# Patient Record
Sex: FEMALE | Race: BLACK OR AFRICAN AMERICAN | NOT HISPANIC OR LATINO
[De-identification: names, ages, dates, MRNs, and addresses within clinical notes are randomized per-mention and may not be internally consistent; named-entity substitution may affect disease eponyms.]

---

## 2020-08-31 ENCOUNTER — APPOINTMENT (OUTPATIENT)
Dept: FAMILY MEDICINE | Facility: CLINIC | Age: 25
End: 2020-08-31
Payer: COMMERCIAL

## 2020-08-31 VITALS
TEMPERATURE: 98.1 F | OXYGEN SATURATION: 98 % | WEIGHT: 174.56 LBS | HEIGHT: 66 IN | HEART RATE: 85 BPM | SYSTOLIC BLOOD PRESSURE: 110 MMHG | BODY MASS INDEX: 28.05 KG/M2 | DIASTOLIC BLOOD PRESSURE: 70 MMHG

## 2020-08-31 LAB — CYTOLOGY CVX/VAG DOC THIN PREP: NORMAL

## 2020-08-31 PROCEDURE — 99385 PREV VISIT NEW AGE 18-39: CPT | Mod: 25

## 2020-08-31 PROCEDURE — 36415 COLL VENOUS BLD VENIPUNCTURE: CPT

## 2020-08-31 NOTE — HEALTH RISK ASSESSMENT
[Excellent] : ~his/her~  mood as  excellent [] : No [Yes] : Yes [No falls in past year] : Patient reported no falls in the past year [0] : 2) Feeling down, depressed, or hopeless: Not at all (0) [[id number]] : 0 [None] : None [With Significant Other] : lives with significant other [Employed] : employed [Single] : single [Fully functional (bathing, dressing, toileting, transferring, walking, feeding)] : Fully functional (bathing, dressing, toileting, transferring, walking, feeding) [Fully functional (using the telephone, shopping, preparing meals, housekeeping, doing laundry, using] : Fully functional and needs no help or supervision to perform IADLs (using the telephone, shopping, preparing meals, housekeeping, doing laundry, using transportation, managing medications and managing finances) [de-identified] :

## 2020-08-31 NOTE — ASSESSMENT
[FreeTextEntry1] : Patient was counseled on healthy eating habits, on daily exercise and stress relief. All medications and allergies were reviewed with the patient and any adjustments necessary were made. Patient was counseled to try engage in an exercise activity for at least 30 minutes 3-4 times a week.  Patient was advised to eat a diet low in fat and carbohydrates and high in protein, with plenty of vegetables. Patient was advised to try and engage in relaxing activities whenever possible.\par The patients blood was draw in office and will be followed and assessed for any issues.  Patient was told to return to the office if any issues arise.  Unless otherwise stated, the patient is to continue all other medications as previously prescribed.\par \par std\par Patient was counseled on STD testing and screening. Patient was counseled on appropriate safe sexual practices and universal precautions. Patients questions regarding options, causes and prognosis were discussed and answered. Patient options were reviewed.\par \par

## 2020-08-31 NOTE — PHYSICAL EXAM
[Well Nourished] : well nourished [Clear to Auscultation] : lungs were clear to auscultation bilaterally [Normal S1, S2] : normal S1 and S2 [Regular Rhythm] : with a regular rhythm [No Joint Swelling] : no joint swelling [No Rash] : no rash [Normal Gait] : normal gait [Normal Insight/Judgement] : insight and judgment were intact [Normal Affect] : the affect was normal

## 2020-08-31 NOTE — HISTORY OF PRESENT ILLNESS
[de-identified] : 24 year old female here to establish care and for a full physical examination. The patients complete medical, family and social history was documented and reviewed with the patient.  The patients medications were identified and also reviewed with the patient as well as any allergies to any medications. All active and previous medical problems were identified and discussed with the patient.  Any new problems were documented. Patient is feeling well today.\par

## 2020-09-01 LAB
ALBUMIN SERPL ELPH-MCNC: 4.7 G/DL
ALP BLD-CCNC: 42 U/L
ALT SERPL-CCNC: 29 U/L
ANION GAP SERPL CALC-SCNC: 13 MMOL/L
AST SERPL-CCNC: 21 U/L
BASOPHILS # BLD AUTO: 0.05 K/UL
BASOPHILS NFR BLD AUTO: 1 %
BILIRUB SERPL-MCNC: 0.3 MG/DL
BUN SERPL-MCNC: 10 MG/DL
CALCIUM SERPL-MCNC: 9.2 MG/DL
CHLORIDE SERPL-SCNC: 105 MMOL/L
CHOLEST SERPL-MCNC: 187 MG/DL
CHOLEST/HDLC SERPL: 2.8 RATIO
CO2 SERPL-SCNC: 24 MMOL/L
CREAT SERPL-MCNC: 0.96 MG/DL
EOSINOPHIL # BLD AUTO: 0.32 K/UL
EOSINOPHIL NFR BLD AUTO: 6.4 %
GLUCOSE SERPL-MCNC: 83 MG/DL
HCT VFR BLD CALC: 41.7 %
HDLC SERPL-MCNC: 66 MG/DL
HGB BLD-MCNC: 12.6 G/DL
HIV1+2 AB SPEC QL IA.RAPID: NONREACTIVE
HSV 1+2 IGG SER IA-IMP: NEGATIVE
HSV 1+2 IGG SER IA-IMP: POSITIVE
HSV1 IGG SER QL: 46.5 INDEX
HSV2 IGG SER QL: 0.16 INDEX
IMM GRANULOCYTES NFR BLD AUTO: 0.2 %
LDLC SERPL CALC-MCNC: 109 MG/DL
LYMPHOCYTES # BLD AUTO: 2.13 K/UL
LYMPHOCYTES NFR BLD AUTO: 42.7 %
MAN DIFF?: NORMAL
MCHC RBC-ENTMCNC: 27.3 PG
MCHC RBC-ENTMCNC: 30.2 GM/DL
MCV RBC AUTO: 90.5 FL
MONOCYTES # BLD AUTO: 0.47 K/UL
MONOCYTES NFR BLD AUTO: 9.4 %
NEUTROPHILS # BLD AUTO: 2.01 K/UL
NEUTROPHILS NFR BLD AUTO: 40.3 %
PLATELET # BLD AUTO: 311 K/UL
POTASSIUM SERPL-SCNC: 4.5 MMOL/L
PROT SERPL-MCNC: 7.7 G/DL
RBC # BLD: 4.61 M/UL
RBC # FLD: 13.8 %
SARS-COV-2 IGG SERPL IA-ACNC: 0.1 INDEX
SARS-COV-2 IGG SERPL QL IA: NEGATIVE
SODIUM SERPL-SCNC: 141 MMOL/L
T PALLIDUM AB SER QL IA: NEGATIVE
TRIGL SERPL-MCNC: 57 MG/DL
TSH SERPL-ACNC: 0.7 UIU/ML
WBC # FLD AUTO: 4.99 K/UL

## 2020-09-03 LAB
C TRACH RRNA SPEC QL NAA+PROBE: DETECTED
N GONORRHOEA RRNA SPEC QL NAA+PROBE: NOT DETECTED
SOURCE AMPLIFICATION: NORMAL

## 2020-12-15 ENCOUNTER — RESULT REVIEW (OUTPATIENT)
Age: 25
End: 2020-12-15

## 2022-02-16 ENCOUNTER — RESULT REVIEW (OUTPATIENT)
Age: 27
End: 2022-02-16

## 2022-05-06 ENCOUNTER — LABORATORY RESULT (OUTPATIENT)
Age: 27
End: 2022-05-06

## 2022-05-06 ENCOUNTER — APPOINTMENT (OUTPATIENT)
Dept: ULTRASOUND IMAGING | Facility: IMAGING CENTER | Age: 27
End: 2022-05-06

## 2022-05-06 ENCOUNTER — APPOINTMENT (OUTPATIENT)
Dept: HEPATOLOGY | Facility: CLINIC | Age: 27
End: 2022-05-06
Payer: COMMERCIAL

## 2022-05-06 VITALS
RESPIRATION RATE: 16 BRPM | HEIGHT: 66.75 IN | SYSTOLIC BLOOD PRESSURE: 107 MMHG | TEMPERATURE: 97.7 F | DIASTOLIC BLOOD PRESSURE: 74 MMHG | HEART RATE: 93 BPM | OXYGEN SATURATION: 98 % | BODY MASS INDEX: 31.08 KG/M2 | WEIGHT: 198 LBS

## 2022-05-06 PROCEDURE — 99205 OFFICE O/P NEW HI 60 MIN: CPT

## 2022-05-06 RX ORDER — AZITHROMYCIN 1 G/1
1 POWDER, FOR SUSPENSION ORAL
Qty: 1 | Refills: 0 | Status: DISCONTINUED | COMMUNITY
Start: 2020-09-03 | End: 2022-05-06

## 2022-05-09 LAB
AFP-TM SERPL-MCNC: 43.9 NG/ML
ALBUMIN SERPL ELPH-MCNC: 4 G/DL
ALP BLD-CCNC: 38 U/L
ALT SERPL-CCNC: 25 U/L
ANION GAP SERPL CALC-SCNC: 12 MMOL/L
AST SERPL-CCNC: 19 U/L
BASOPHILS # BLD AUTO: 0.04 K/UL
BASOPHILS NFR BLD AUTO: 0.3 %
BILIRUB SERPL-MCNC: 0.2 MG/DL
BUN SERPL-MCNC: 7 MG/DL
CALCIUM SERPL-MCNC: 9.5 MG/DL
CHLORIDE SERPL-SCNC: 103 MMOL/L
CO2 SERPL-SCNC: 21 MMOL/L
CREAT SERPL-MCNC: 0.62 MG/DL
EGFR: 124 ML/MIN/1.73M2
EOSINOPHIL # BLD AUTO: 0.35 K/UL
EOSINOPHIL NFR BLD AUTO: 2.9 %
HAV IGM SER QL: NONREACTIVE
HBV CORE IGM SER QL: NONREACTIVE
HBV DNA # SERPL NAA+PROBE: 2203 IU/ML
HBV SURFACE AG SER QL: REACTIVE
HCT VFR BLD CALC: 36.4 %
HCV AB SER QL: NONREACTIVE
HCV S/CO RATIO: 0.07 S/CO
HEPATITIS A IGG ANTIBODY: REACTIVE
HEPB DNA PCR INT: DETECTED
HEPB DNA PCR LOG: 3.34 LOGIU/ML
HGB BLD-MCNC: 11.6 G/DL
IMM GRANULOCYTES NFR BLD AUTO: 1.1 %
INR PPP: 0.97 RATIO
LYMPHOCYTES # BLD AUTO: 2.04 K/UL
LYMPHOCYTES NFR BLD AUTO: 17.1 %
MAN DIFF?: NORMAL
MCHC RBC-ENTMCNC: 27.6 PG
MCHC RBC-ENTMCNC: 31.9 GM/DL
MCV RBC AUTO: 86.7 FL
MONOCYTES # BLD AUTO: 0.78 K/UL
MONOCYTES NFR BLD AUTO: 6.5 %
NEUTROPHILS # BLD AUTO: 8.62 K/UL
NEUTROPHILS NFR BLD AUTO: 72.1 %
PLATELET # BLD AUTO: 288 K/UL
POTASSIUM SERPL-SCNC: 4.2 MMOL/L
PROT SERPL-MCNC: 6.8 G/DL
PT BLD: 11.4 SEC
RBC # BLD: 4.2 M/UL
RBC # FLD: 13.8 %
SODIUM SERPL-SCNC: 135 MMOL/L
WBC # FLD AUTO: 11.96 K/UL

## 2022-05-09 NOTE — ASSESSMENT
[FreeTextEntry1] : Ms. AMBAR ARENAS is 26 year old female who presents for the initial evaluation with HBV.\par \par # Hepatitis BV - *HBV panel- Reactive to HBs Ag, anti-HBc, anti-HBe; Non-reactive anti-HBs, HBe Ag; Will start on Anti-viral which are safe during pregnancy and the plan of  being vaccinated and Immunoglobulin given at the time of delivery. Advised to get US and FS as ordered. \par \par > HBV DNA - Labs on 2022 shows detected HBV DNA 2203/3.34 < 69501/4.03 (2022). \par > Pregnant - she was evaluated by her GYN, She is 16 weeks pregnant, ETD in Oct 16, 2022. >elevated AFP 43.9 (pregnant) WBC 11.96, Low Alp 38, chronic low MCHC 31.9, WDL- , INR0.97; \par > Risk - PH/o had STDs x2 and was treated with azithromycin for chlamydia x2, + HSV + Gardnerella Vaginalis (2020). Her partner was treated as well, she is an [place] from [facility] at age of 1 year old with her parents. \par \par R/O Acute LD - She feels well. Denies c/o abdominal pain, pruritis, melena, No MH/O Liver diseases, clinical hepatitis, Jaundice.\par Social H/O denies alcohol use or smoking. 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 No pertinent MH/SH hernia repair. Denies any FH/O Liver disease or GI cancers. \par COL/EGD – Never done. Pertinent labs shows A+ blood type, \par \par # Immunity – NR: HAV and HCV (2022) Immune to HAV. +Anti-COVID TNC 83.90 (2022) she completed the 2 dose COVID vaccine with no adverse effects.\par Additional Providers: PCP Diana Stewart.\par \par PLAN to F/U with Dr. Dick with labs, US and FS with RPA to establish plan of care while pregnant. \par Encouraged to call back in the interim with any issues or concerns so that we can address and assist as required.

## 2022-05-09 NOTE — PHYSICAL EXAM
[Scleral Icterus] : No Scleral Icterus [Spider Angioma] : No spider angioma(s) were observed [Abdominal  Ascites] : no ascites [Asterixis] : no asterixis observed [Jaundice] : No jaundice [Palmar Erythema] : no Palmar Erythema [Depression] : no depression [FreeTextEntry4] : 16 weeks Pregnant  [General Appearance - Alert] : alert [General Appearance - Well Nourished] : well nourished [Sclera] : the sclera and conjunctiva were normal [Neck Appearance] : the appearance of the neck was normal [Neck Cervical Mass (___cm)] : no neck mass was observed [Exaggerated Use Of Accessory Muscles For Inspiration] : no accessory muscle use [Apical Impulse] : the apical impulse was normal [Heart Sounds] : normal S1 and S2 [Edema] : there was no peripheral edema [Veins - Varicosity Changes] : there were no varicosital changes [Bowel Sounds] : normal bowel sounds [Abdomen Tenderness] : non-tender [Cervical Lymph Nodes Enlarged Posterior Bilaterally] : posterior cervical [Supraclavicular Lymph Nodes Enlarged Bilaterally] : supraclavicular [No CVA Tenderness] : no ~M costovertebral angle tenderness [Abnormal Walk] : normal gait [Involuntary Movements] : no involuntary movements were seen [Skin Color & Pigmentation] : normal skin color and pigmentation [] : no rash [Skin Lesions] : no skin lesions [No Focal Deficits] : no focal deficits [Oriented To Time, Place, And Person] : oriented to person, place, and time [Affect] : the affect was normal

## 2022-05-09 NOTE — HISTORY OF PRESENT ILLNESS
[Tattoo] : tattoo(s) [Body Piercing] : body piercing [Alcohol Abuse] : alcohol abuse [Household Contact to HBV] : household contact to HBV [Occupational Exposure] : occupational exposure [Mild] : mild [Fatigue] : denies fatigue [Malaise] : denies malaise [Fever] : denies fever [Diffuse Joint Pain (Arthralgias)] : denies arthralgias [Muscle Aches, Generalized (Myalgias)] : denies myalgias [Yellow Skin Or Eyes (Jaundice)] : denies jaundice [Abdominal Pain] : denies abdominal pain [Urine Tests Nonspecific Abnormal Findings Biliuria] : denies dark urine [Light Colored Bowel Movement (Acholic Stools)] : denies pale stools [Insomnia] : denies insomnia [Skin: Rash] : denies rash [Itching (Pruritus)] : denies pruritus [Shortness Of Breath] : denies shortness of breath [Wt Gain ___ Lbs] : recent [unfilled] ~Upound(s) weight gain [Needlestick Exposure] : no needlestick exposure [Infected Sexual Partner] : no infected sexual partner [IV Drug Use] : no IV drug use [Hemodialysis] : no hemodialysis [Transfusion before 1992] : no transfusion before 1992 [Transplant before 1992] : no transplant before 1992 [Incarceration] : no incarceration [Autoimmune Disorder] : no autoimmune disorder [Travel to Endemic Area] : no travel to an endemic area [Cocaine Use] : no cocaine use [de-identified] : Ms. AMBAR ARENAS is 26 year old female who presents for the initial evaluation with Hepatitis BV as she was evaluated for pregnancy by her GYN, She is 16 weeks pregnant, ETD in Oct 16, 2022. She feels well. Denies c/o abdominal pain, pruritis, melena, No MH/O Liver diseases, clinical hepatitis, Jaundice.\par \par PH/o had STDs x2 and was treated with azithromycin for chlamydia x2, + HSV + Gardnerella Vaginalis (08/31/2020). Her partner was treated as well, she is an [place] from [facility] at age of 1 year old with her parents. \par Social H/O denies alcohol use or smoking. 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 No pertinent MH/SH hernia repair. Denies any FH/O Liver disease or GI cancers. \par COL/EGD – Never done.\par Immunity - Not checked. She completed the 2 dose COVID vaccine with no adverse effects.\par *HBV panel- Reactive to HBs Ag, anti-HBc, anti-HBe; Non-reactive anti-HBs, HBe Ag; \par Immunity – NR: HAV and HCV (05/06/2022) Immune to HAV. +Anti-COVID TNC 83.90 (03/11/2022)\par \par Labs on 05/06/2022 shows elevated AFP 43.9 (pregnant) WBC 11.96, chronic low MCHC 31.9, WDL- , INR 0.97; Low Alp 38, detected HBV DNA 2203/3.34 < 36004/4.03 (03/17/2022). \par Labs on 09/01/2020 shows WDL- CMP, TSH, high ,  \par Pertinent labs shows A+ blood type, \par \par Additional Providers: PCP Diana Stewart.\par  [de-identified] : ear piercing, 2 year and 6 year old tattoos.

## 2022-05-16 ENCOUNTER — APPOINTMENT (OUTPATIENT)
Dept: HEPATOLOGY | Facility: CLINIC | Age: 27
End: 2022-05-16

## 2022-05-27 ENCOUNTER — INPATIENT (INPATIENT)
Facility: HOSPITAL | Age: 27
LOS: 0 days | Discharge: HOME | DRG: 833 | End: 2022-05-28
Attending: OBSTETRICS & GYNECOLOGY | Admitting: OBSTETRICS & GYNECOLOGY
Payer: COMMERCIAL

## 2022-05-27 VITALS
TEMPERATURE: 100 F | SYSTOLIC BLOOD PRESSURE: 114 MMHG | HEART RATE: 91 BPM | DIASTOLIC BLOOD PRESSURE: 69 MMHG | OXYGEN SATURATION: 99 % | RESPIRATION RATE: 16 BRPM

## 2022-05-27 DIAGNOSIS — O26.899 OTHER SPECIFIED PREGNANCY RELATED CONDITIONS, UNSPECIFIED TRIMESTER: ICD-10-CM

## 2022-05-27 DIAGNOSIS — Z3A.00 WEEKS OF GESTATION OF PREGNANCY NOT SPECIFIED: ICD-10-CM

## 2022-05-27 DIAGNOSIS — Z34.80 ENCOUNTER FOR SUPERVISION OF OTHER NORMAL PREGNANCY, UNSPECIFIED TRIMESTER: ICD-10-CM

## 2022-05-27 RX ORDER — SODIUM CHLORIDE 9 MG/ML
1000 INJECTION, SOLUTION INTRAVENOUS
Refills: 0 | Status: COMPLETED | OUTPATIENT
Start: 2022-05-27 | End: 2022-05-28

## 2022-05-27 NOTE — OB PROVIDER H&P - ATTENDING COMMENTS
OB attending     patient is 27 yo  at 19wks presenting to triage for vaginal spotting found to have shortened cervix to 1cm with funneling.    agree with assessment and plan by PGY3.   appreciate MfM consult in AM for possible cerclage placement.   plan of care reviewed with patient, all questions answered.     Carline Ward MD

## 2022-05-27 NOTE — OB PROVIDER H&P - ASSESSMENT
AMBAR ARENAS is a 26y  @ 19w5d admitted for exam indicated rescue cerclage.    #Cervical Insufficiency  - funneling, external os closed, CL 1cm    - VSS and ROS unremarkable     - f/u T&S, CBC, UA, UCx  - continuous toco overnight  - NPO at midnight     #Fetal Status  - FH QD     #Maternal Status  - NPO   - SCDs for DVT prophylaxis     Patient status and plan of care discussed with Dr. Ward.    Roxana Joseph MD  OBGYN PGY3

## 2022-05-27 NOTE — OB PROVIDER H&P - NSINFECTIONS_OBGYN_ALL_OB
None Statement Selected

## 2022-05-27 NOTE — OB PROVIDER H&P - CURRENT PREGNANCY COMPLICATIONS, OB PROFILE
Patient presents to ER via medics with c/o cough, chills since Saturday. Family reports patient coughing up green sputum. None

## 2022-05-27 NOTE — OB PROVIDER H&P - HISTORY OF PRESENT ILLNESS
AMBAR ARENAS is a 26y  @ 19w5d who presents for evaluation after noticing bloody spotting on tissue following a void this evening.  course has been uncomplicated. Pt denies any contractions, dayna vaginal bleeding or leaking of fluid.     OB/GYN HISTORY:     CAT: 10/16/22   (SAB, D&C x1)   Chlamydia , (-) ZAYDA status post tx  Pt denies any hx of fibroids, endometriosis, known ovarian cyst, or abnormal pap smears      PMH: denies   SurgHx: D&C x1  SocHx: denies A/T/D use   Meds: PNV  All: Denies                                           REVIEW OF SYSTEMS:  CONSTITUTIONAL: No weakness, fevers or chills  EYES/ENT: No visual changes  RESPIRATORY: No cough, No shortness of breath  CARDIOVASCULAR: No chest pain or palpitations  GASTROINTESTINAL: No abdominal or epigastric pain. No nausea, vomiting; No diarrhea or constipation  GENITOURINARY: No dysuria, frequency or hematuria  NEUROLOGICAL: No headache, LOC  All other review of systems is negative unless indicated above      Vital Signs Last 24 Hrs  T(C): 37.5 (27 May 2022 19:49), Max: 37.5 (27 May 2022 19:39)  T(F): 99.5 (27 May 2022 19:49), Max: 99.5 (27 May 2022 19:39)  HR: 108 (27 May 2022 23:22) (79 - 108)  BP: 114/69 (27 May 2022 19:51) (114/69 - 114/69)  RR: 16 (27 May 2022 19:49) (16 - 16)  SpO2: 99% (27 May 2022 23:22) (94% - 100%)    PHYSICAL EXAM:  Constitutional: NAD, awake and alert, well-developed  Respiratory: Breathing comfortably on RA  Gastrointestinal: gravid, soft, nontender  Genitourinary: no vaginal bleeding, cervix closed on SSE, SVE deferred    Extremities: No peripheral edema  Neurological: A/O x 3, no focal deficits    FHR: 140s  [place]: no ctx     TA/TVUS: vtx, ANGEL wnl, anterior placenta far from cervix, internal os 1cm dilated w/ funneling appreciated to external os (which is closed, ~ 1cm CL)    LABS: (pending)

## 2022-05-28 ENCOUNTER — ASOB RESULT (OUTPATIENT)
Age: 27
End: 2022-05-28

## 2022-05-28 ENCOUNTER — APPOINTMENT (OUTPATIENT)
Dept: ANTEPARTUM | Facility: CLINIC | Age: 27
End: 2022-05-28

## 2022-05-28 ENCOUNTER — TRANSCRIPTION ENCOUNTER (OUTPATIENT)
Age: 27
End: 2022-05-28

## 2022-05-28 VITALS
HEART RATE: 85 BPM | TEMPERATURE: 99 F | SYSTOLIC BLOOD PRESSURE: 113 MMHG | DIASTOLIC BLOOD PRESSURE: 64 MMHG | RESPIRATION RATE: 17 BRPM

## 2022-05-28 LAB
APPEARANCE UR: CLEAR — SIGNIFICANT CHANGE UP
BASOPHILS # BLD AUTO: 0.05 K/UL — SIGNIFICANT CHANGE UP (ref 0–0.2)
BASOPHILS NFR BLD AUTO: 0.5 % — SIGNIFICANT CHANGE UP (ref 0–2)
BILIRUB UR-MCNC: NEGATIVE — SIGNIFICANT CHANGE UP
BLD GP AB SCN SERPL QL: NEGATIVE — SIGNIFICANT CHANGE UP
COLOR SPEC: COLORLESS — SIGNIFICANT CHANGE UP
COVID-19 SPIKE DOMAIN AB INTERP: POSITIVE
COVID-19 SPIKE DOMAIN ANTIBODY RESULT: >250 U/ML — HIGH
DIFF PNL FLD: NEGATIVE — SIGNIFICANT CHANGE UP
EOSINOPHIL # BLD AUTO: 0.21 K/UL — SIGNIFICANT CHANGE UP (ref 0–0.5)
EOSINOPHIL NFR BLD AUTO: 2 % — SIGNIFICANT CHANGE UP (ref 0–6)
GLUCOSE UR QL: NEGATIVE — SIGNIFICANT CHANGE UP
HCT VFR BLD CALC: 36.5 % — SIGNIFICANT CHANGE UP (ref 34.5–45)
HGB BLD-MCNC: 12.1 G/DL — SIGNIFICANT CHANGE UP (ref 11.5–15.5)
IMM GRANULOCYTES NFR BLD AUTO: 1 % — SIGNIFICANT CHANGE UP (ref 0–1.5)
KETONES UR-MCNC: NEGATIVE — SIGNIFICANT CHANGE UP
LEUKOCYTE ESTERASE UR-ACNC: NEGATIVE — SIGNIFICANT CHANGE UP
LYMPHOCYTES # BLD AUTO: 19.1 % — SIGNIFICANT CHANGE UP (ref 13–44)
LYMPHOCYTES # BLD AUTO: 2.01 K/UL — SIGNIFICANT CHANGE UP (ref 1–3.3)
MCHC RBC-ENTMCNC: 28.1 PG — SIGNIFICANT CHANGE UP (ref 27–34)
MCHC RBC-ENTMCNC: 33.2 GM/DL — SIGNIFICANT CHANGE UP (ref 32–36)
MCV RBC AUTO: 84.9 FL — SIGNIFICANT CHANGE UP (ref 80–100)
MONOCYTES # BLD AUTO: 0.63 K/UL — SIGNIFICANT CHANGE UP (ref 0–0.9)
MONOCYTES NFR BLD AUTO: 6 % — SIGNIFICANT CHANGE UP (ref 2–14)
NEUTROPHILS # BLD AUTO: 7.54 K/UL — HIGH (ref 1.8–7.4)
NEUTROPHILS NFR BLD AUTO: 71.4 % — SIGNIFICANT CHANGE UP (ref 43–77)
NITRITE UR-MCNC: NEGATIVE — SIGNIFICANT CHANGE UP
NRBC # BLD: 0 /100 WBCS — SIGNIFICANT CHANGE UP (ref 0–0)
PH UR: 7.5 — SIGNIFICANT CHANGE UP (ref 5–8)
PLATELET # BLD AUTO: 273 K/UL — SIGNIFICANT CHANGE UP (ref 150–400)
PROT UR-MCNC: NEGATIVE — SIGNIFICANT CHANGE UP
RBC # BLD: 4.3 M/UL — SIGNIFICANT CHANGE UP (ref 3.8–5.2)
RBC # FLD: 13.5 % — SIGNIFICANT CHANGE UP (ref 10.3–14.5)
RH IG SCN BLD-IMP: POSITIVE — SIGNIFICANT CHANGE UP
RH IG SCN BLD-IMP: POSITIVE — SIGNIFICANT CHANGE UP
SARS-COV-2 IGG+IGM SERPL QL IA: >250 U/ML — HIGH
SARS-COV-2 IGG+IGM SERPL QL IA: POSITIVE
SARS-COV-2 RNA SPEC QL NAA+PROBE: SIGNIFICANT CHANGE UP
SP GR SPEC: 1.01 — SIGNIFICANT CHANGE UP (ref 1.01–1.02)
T PALLIDUM AB TITR SER: NEGATIVE — SIGNIFICANT CHANGE UP
UROBILINOGEN FLD QL: NEGATIVE — SIGNIFICANT CHANGE UP
WBC # BLD: 10.55 K/UL — HIGH (ref 3.8–10.5)
WBC # FLD AUTO: 10.55 K/UL — HIGH (ref 3.8–10.5)

## 2022-05-28 PROCEDURE — 76815 OB US LIMITED FETUS(S): CPT

## 2022-05-28 PROCEDURE — 86900 BLOOD TYPING SEROLOGIC ABO: CPT

## 2022-05-28 PROCEDURE — G0463: CPT

## 2022-05-28 PROCEDURE — 85025 COMPLETE CBC W/AUTO DIFF WBC: CPT

## 2022-05-28 PROCEDURE — 87591 N.GONORRHOEAE DNA AMP PROB: CPT

## 2022-05-28 PROCEDURE — 87086 URINE CULTURE/COLONY COUNT: CPT

## 2022-05-28 PROCEDURE — 87800 DETECT AGNT MULT DNA DIREC: CPT

## 2022-05-28 PROCEDURE — 81003 URINALYSIS AUTO W/O SCOPE: CPT

## 2022-05-28 PROCEDURE — 36415 COLL VENOUS BLD VENIPUNCTURE: CPT

## 2022-05-28 PROCEDURE — 87635 SARS-COV-2 COVID-19 AMP PRB: CPT

## 2022-05-28 PROCEDURE — 76817 TRANSVAGINAL US OBSTETRIC: CPT | Mod: 26

## 2022-05-28 PROCEDURE — 86780 TREPONEMA PALLIDUM: CPT

## 2022-05-28 PROCEDURE — 99253 IP/OBS CNSLTJ NEW/EST LOW 45: CPT

## 2022-05-28 PROCEDURE — 86901 BLOOD TYPING SEROLOGIC RH(D): CPT

## 2022-05-28 PROCEDURE — 86850 RBC ANTIBODY SCREEN: CPT

## 2022-05-28 PROCEDURE — 86769 SARS-COV-2 COVID-19 ANTIBODY: CPT

## 2022-05-28 PROCEDURE — G0378: CPT

## 2022-05-28 PROCEDURE — 76817 TRANSVAGINAL US OBSTETRIC: CPT

## 2022-05-28 PROCEDURE — 76815 OB US LIMITED FETUS(S): CPT | Mod: 26,59

## 2022-05-28 RX ORDER — PROGESTERONE 200 MG/1
200 CAPSULE, LIQUID FILLED ORAL
Qty: 30 | Refills: 0
Start: 2022-05-28 | End: 2022-06-26

## 2022-05-28 RX ORDER — SODIUM CHLORIDE 9 MG/ML
1000 INJECTION INTRAMUSCULAR; INTRAVENOUS; SUBCUTANEOUS
Refills: 0 | Status: DISCONTINUED | OUTPATIENT
Start: 2022-05-28 | End: 2022-05-28

## 2022-05-28 RX ADMIN — SODIUM CHLORIDE 125 MILLILITER(S): 9 INJECTION, SOLUTION INTRAVENOUS at 00:52

## 2022-05-28 RX ADMIN — SODIUM CHLORIDE 125 MILLILITER(S): 9 INJECTION INTRAMUSCULAR; INTRAVENOUS; SUBCUTANEOUS at 03:35

## 2022-05-28 NOTE — DISCHARGE NOTE ANTEPARTUM - ADDITIONAL INSTRUCTIONS
Call your doctor if you experience abdominal pain, decreased fetal movement, contractions, leakage of fluid, and vaginal bleeding.   Call your doctor and go to the ER if you experience severe discomfort, chest pain, shortness of breath, fever greater than 100.4, vaginal bleeding.

## 2022-05-28 NOTE — PROGRESS NOTE ADULT - ASSESSMENT
AMBAR ARENAS is a 26y  @ 19w6d admitted for exam indicated rescue cerclage.    #Cervical Insufficiency  - funneling, external os closed, CL 1cm    - VSS and ROS unremarkable     - WBC 10.5, UA unremarkable   - continuous toco overnight, no ctx       #Fetal Status  - FH QD     #Maternal Status  - NPO   - SCDs for DVT prophylaxis       Roxana Jsoeph MD  OBGYN PGY3 
A/P: 25yo  at 19w6d admitted with short cervix 1.2-1.3cm in setting of one episode of VB. Rh+. No further bleeding. Pt comfortable. Discussed with patient implications of short cervix including inc risk of  delivery, specifically that of extreme prematurity with possibilty of infant who has chronic complications such as cerebral palsy, motor/cognitive delay. Neurosensory deficits. Explained that knowing this risk termination of pregnancy is a reasonable option. Patient declined this option. Due to CL of 1.2-1.3cm w/ closed cervix not candidate for exam/"rescue"  cerclage - however best evidence supports vaginal supp therapy with close surveillance with weekly CL. Explained if CL becomes 1.0cm or less may be canidate and that this may include a preoperative amniocentesis to verify no intramniotic infection. Also retesting for above STI today to ensure no current infection including hep B viral load as risk of vertical transmission low however present if amniocentesis performed. All questions answered pt verbalized understanding opted for vag P w/ serial CL. To follow up this week. Precautions reviewed.    Patient seen with Dr. Balderrama (M attending)    Russell Dowling M.D. PGY-5  Maternal Fetal Medicine Fellow  Cell: [phone] if after 5pm or weekend ask labor and delivery for on call fellow

## 2022-05-28 NOTE — PROGRESS NOTE ADULT - NSPROGADDITIONALINFOA_GEN_ALL_CORE
OB attending   pt with spotting overnight  denies additional bleeding or cramping +FM  will see MFM consultation and then coordinate next steps  all questions answered  Maritza Warren< MD

## 2022-05-28 NOTE — PROGRESS NOTE ADULT - SUBJECTIVE AND OBJECTIVE BOX
MFM Fellow Consult Note    HPI: 27yo  at 19w6d who is admitted following an episode of spotting and passage of pea sized clot. On presentation yesterday noted to have CL of 1.1. She was kept overnight for MFM evaluation today to determine next best steps in management. Of note history significant for chronic hep B, chlamydia infection x3, trichamonas infection. Test of cure documentation provided by Dr. Warren. Denies ctx/lof. Denies fever/chills. Does not yet appreciate fetal movement.        Histories  : SAB 8 wk D&C  G2: Ectopic preg MTX  GYN: STI as described above  Surg: D&C  Social: none  Meds: PNV    Physical Exam  ICU Vital Signs Last 24 Hrs  T(C): 37.0 (28 May 2022 13:27), Max: 37.5 (27 May 2022 19:39)  T(F): 98.6 (28 May 2022 13:27), Max: 99.5 (27 May 2022 19:39)  HR: 85 (28 May 2022 13:27) (75 - 108)  BP: 113/64 (28 May 2022 13:27) (112/66 - 114/69)  BP(mean): --  ABP: --  ABP(mean): --  RR: 17 (28 May 2022 13:27) (16 - 17)  SpO2: 100% (28 May 2022 13:10) (91% - 100%)  Gen: well appearing  Pelvic: Cervix closed, physiologic discharge, cultures collected    Ultrasound - Full report in AS  CL 1.2-1.3 w/ u shaped funneling, present during suprapubic pressure  Limited anatomy within normal limits    
R3 Antepartum Note, HD#2    Interval events: Patient seen and examined at bedside, no acute overnight events. No acute complaints. Pt denies LOF, VB, ctx, fevers, or chills.    Vital Signs Last 24 Hours  T(C): 37.5 (05-28-22 @ 00:13), Max: 37.5 (05-27-22 @ 19:39)  HR: 81 (05-28-22 @ 04:27) (76 - 108)  BP: 114/69 (05-28-22 @ 00:13) (114/69 - 114/69)  RR: 16 (05-28-22 @ 00:13) (16 - 16)  SpO2: 100% (05-28-22 @ 04:27) (91% - 100%)      Physical Exam:  General: NAD  Abdomen: Soft, non-tender, gravid    [place]: no ctx overnight     Labs:             12.1   10.55 )-----------( 273      ( 05-27 @ 23:57 )             36.5             MEDICATIONS  (STANDING):  sodium chloride 0.9%. 1000 milliLiter(s) (125 mL/Hr) IV Continuous <Continuous>    MEDICATIONS  (PRN):

## 2022-05-28 NOTE — DISCHARGE NOTE ANTEPARTUM - CARE PROVIDER_API CALL
Maritza Warren)  Obstetrics and Gynecology  [address], [address]  [place] [id number]  Phone: [phone]  Fax: [phone]  Follow Up Time: 1 week

## 2022-05-28 NOTE — DISCHARGE NOTE ANTEPARTUM - HOSPITAL COURSE
26y  @19w6d admitted for cervical insufficiency with  funneling, external os closed, CL 1cm. Monitoring with reassuring fetal status and no contractions on toco. To be discharged home with progesterone and close interval follow up

## 2022-05-28 NOTE — DISCHARGE NOTE ANTEPARTUM - CARE PLAN
1 Principal Discharge DX:	Short cervix  Assessment and plan of treatment:	26y  @ 19w6d admitted for cervical insufficieny to be discharged home on progesterone

## 2022-05-28 NOTE — DISCHARGE NOTE ANTEPARTUM - PATIENT PORTAL LINK FT
You can access the FollowMyHealth Patient Portal offered by [facility] by registering at the following website: [email or web address]. By joining [facility]’s FollowMyHealth portal, you will also be able to view your health information using other applications (apps) compatible with our system.

## 2022-05-28 NOTE — DISCHARGE NOTE ANTEPARTUM - MEDICATION SUMMARY - MEDICATIONS TO TAKE
I will START or STAY ON the medications listed below when I get home from the hospital:    Prometrium 200 mg oral capsule  -- 200 milligram(s) intravaginally once a day (at bedtime)   -- Do not take this drug if you are pregnant.  May cause drowsiness or dizziness.    -- Indication: For Short cervix

## 2022-05-28 NOTE — DISCHARGE NOTE ANTEPARTUM - NS MD DC FALL RISK RISK
For information on Fall & Injury Prevention, visit: [email or web address] OR  [email or web address] OR  https://www.cdc.gov/steadi/patient.html

## 2022-05-29 LAB
CANDIDA AB TITR SER: SIGNIFICANT CHANGE UP
CULTURE RESULTS: SIGNIFICANT CHANGE UP
G VAGINALIS DNA SPEC QL NAA+PROBE: DETECTED
SPECIMEN SOURCE: SIGNIFICANT CHANGE UP
T VAGINALIS SPEC QL WET PREP: SIGNIFICANT CHANGE UP

## 2022-05-30 LAB
N GONORRHOEA RRNA SPEC QL NAA+PROBE: SIGNIFICANT CHANGE UP
SPECIMEN SOURCE: SIGNIFICANT CHANGE UP

## 2022-06-01 ENCOUNTER — TRANSCRIPTION ENCOUNTER (OUTPATIENT)
Age: 27
End: 2022-06-01

## 2022-06-02 ENCOUNTER — APPOINTMENT (OUTPATIENT)
Dept: ANTEPARTUM | Facility: CLINIC | Age: 27
End: 2022-06-02
Payer: COMMERCIAL

## 2022-06-02 ENCOUNTER — OUTPATIENT (OUTPATIENT)
Dept: OUTPATIENT SERVICES | Facility: HOSPITAL | Age: 27
LOS: 1 days | End: 2022-06-02
Payer: COMMERCIAL

## 2022-06-02 ENCOUNTER — ASOB RESULT (OUTPATIENT)
Age: 27
End: 2022-06-02

## 2022-06-02 VITALS
RESPIRATION RATE: 17 BRPM | DIASTOLIC BLOOD PRESSURE: 74 MMHG | TEMPERATURE: 98 F | HEART RATE: 86 BPM | SYSTOLIC BLOOD PRESSURE: 111 MMHG

## 2022-06-02 VITALS
TEMPERATURE: 98 F | HEART RATE: 90 BPM | OXYGEN SATURATION: 100 % | SYSTOLIC BLOOD PRESSURE: 110 MMHG | DIASTOLIC BLOOD PRESSURE: 71 MMHG

## 2022-06-02 DIAGNOSIS — Z3A.00 WEEKS OF GESTATION OF PREGNANCY NOT SPECIFIED: ICD-10-CM

## 2022-06-02 DIAGNOSIS — O26.899 OTHER SPECIFIED PREGNANCY RELATED CONDITIONS, UNSPECIFIED TRIMESTER: ICD-10-CM

## 2022-06-02 LAB
APPEARANCE UR: CLEAR — SIGNIFICANT CHANGE UP
BACTERIA # UR AUTO: NEGATIVE — SIGNIFICANT CHANGE UP
BILIRUB UR-MCNC: NEGATIVE — SIGNIFICANT CHANGE UP
BLD GP AB SCN SERPL QL: NEGATIVE — SIGNIFICANT CHANGE UP
COLOR SPEC: YELLOW — SIGNIFICANT CHANGE UP
DIFF PNL FLD: NEGATIVE — SIGNIFICANT CHANGE UP
EPI CELLS # UR: 3 /HPF — SIGNIFICANT CHANGE UP
GLUCOSE UR QL: NEGATIVE — SIGNIFICANT CHANGE UP
HCT VFR BLD CALC: 36.4 % — SIGNIFICANT CHANGE UP (ref 34.5–45)
HGB BLD-MCNC: 11.7 G/DL — SIGNIFICANT CHANGE UP (ref 11.5–15.5)
HYALINE CASTS # UR AUTO: 6 /LPF — HIGH (ref 0–2)
KETONES UR-MCNC: ABNORMAL
LEUKOCYTE ESTERASE UR-ACNC: NEGATIVE — SIGNIFICANT CHANGE UP
MCHC RBC-ENTMCNC: 27.4 PG — SIGNIFICANT CHANGE UP (ref 27–34)
MCHC RBC-ENTMCNC: 32.1 GM/DL — SIGNIFICANT CHANGE UP (ref 32–36)
MCV RBC AUTO: 85.2 FL — SIGNIFICANT CHANGE UP (ref 80–100)
NITRITE UR-MCNC: NEGATIVE — SIGNIFICANT CHANGE UP
NRBC # BLD: 0 /100 WBCS — SIGNIFICANT CHANGE UP (ref 0–0)
PH UR: 6.5 — SIGNIFICANT CHANGE UP (ref 5–8)
PLATELET # BLD AUTO: 272 K/UL — SIGNIFICANT CHANGE UP (ref 150–400)
PROT UR-MCNC: ABNORMAL
RBC # BLD: 4.27 M/UL — SIGNIFICANT CHANGE UP (ref 3.8–5.2)
RBC # FLD: 13.6 % — SIGNIFICANT CHANGE UP (ref 10.3–14.5)
RBC CASTS # UR COMP ASSIST: 4 /HPF — SIGNIFICANT CHANGE UP (ref 0–4)
RH IG SCN BLD-IMP: POSITIVE — SIGNIFICANT CHANGE UP
SP GR SPEC: 1.03 — HIGH (ref 1.01–1.02)
UROBILINOGEN FLD QL: NEGATIVE — SIGNIFICANT CHANGE UP
WBC # BLD: 10.47 K/UL — SIGNIFICANT CHANGE UP (ref 3.8–10.5)
WBC # FLD AUTO: 10.47 K/UL — SIGNIFICANT CHANGE UP (ref 3.8–10.5)
WBC UR QL: 3 /HPF — SIGNIFICANT CHANGE UP (ref 0–5)

## 2022-06-02 PROCEDURE — G0463: CPT

## 2022-06-02 PROCEDURE — 86850 RBC ANTIBODY SCREEN: CPT

## 2022-06-02 PROCEDURE — 76811 OB US DETAILED SNGL FETUS: CPT

## 2022-06-02 PROCEDURE — 99214 OFFICE O/P EST MOD 30 MIN: CPT | Mod: 25

## 2022-06-02 PROCEDURE — 81001 URINALYSIS AUTO W/SCOPE: CPT

## 2022-06-02 PROCEDURE — 76817 TRANSVAGINAL US OBSTETRIC: CPT

## 2022-06-02 PROCEDURE — 36415 COLL VENOUS BLD VENIPUNCTURE: CPT

## 2022-06-02 PROCEDURE — 86900 BLOOD TYPING SEROLOGIC ABO: CPT

## 2022-06-02 PROCEDURE — 86901 BLOOD TYPING SEROLOGIC RH(D): CPT

## 2022-06-02 PROCEDURE — 85027 COMPLETE CBC AUTOMATED: CPT

## 2022-06-02 RX ORDER — METRONIDAZOLE 500 MG
1 TABLET ORAL
Qty: 14 | Refills: 0
Start: 2022-06-02 | End: 2022-06-08

## 2022-06-02 NOTE — OB RN TRIAGE NOTE - FALL HARM RISK - UNIVERSAL INTERVENTIONS
Bed in lowest position, wheels locked, appropriate side rails in place/Call bell, personal items and telephone in reach/Instruct patient to call for assistance before getting out of bed or chair/Non-slip footwear when patient is out of bed/[place] to call system/Physically safe environment - no spills, clutter or unnecessary equipment/Purposeful Proactive Rounding/Room/bathroom lighting operational, light cord in reach

## 2022-06-02 NOTE — CONSULT NOTE ADULT - ASSESSMENT
A/P:   26y  at 20w4d with cervical insufficiency. Patient and partner counseled at length regarding findings and current options. Discussed the following 3 options of 1) placing a cerclage at this time with review of standard of care and evidence for offering cerclage with CL < 1cm. 2) awaiting cerclage placement and postponing until treatment of BV, to avoid any possible contamination/ introduction of infection during cerclage placement, with cerclage placement after treatment. This option with the understanding that it is difficult to predict what her clinical presentation will be post treatment and that her cervix may shorten or even dilate by that time, making cerclage placement more challenging and with increased risk for infection / ROM. and 3) expectant management without surgical intervention and with further US surviellance and vaginal progesterone.   Patient and partner have decided for option 2 above, with treatment of BV and for cerclage placement next week. Will coordinate care.   For treatment of BV with flagyl at this time.     MIRA Hale Fellow  Seen and discussed with MIRA Gonzalez Attending 
negative...

## 2022-06-02 NOTE — CONSULT NOTE ADULT - SUBJECTIVE AND OBJECTIVE BOX
MFM CONSULT NOTE:     HPI:  26y  at 20w4d referred for finding of CL 1.3cm, 0.8cm with suprapubic pressure.  Patient last seen on L&D 5 days ago, at that time had spotting and was found to have a short cervix at 1.25cm, and was started on vaginal progesterone at that time. She returned today for a follow up cervical length and was found to be further shortened.  On physical exam, cervix closed visually, on digital exam external os 0.5 cm dilated with closed internal os.  Patient with new diagnosis of BV, currently on flagyl, no symptoms.     ATU (): CL 0.8-1.3cm w/ funneling, EFW 369g    OB/GYN HISTORY:     CAT: 10/16/22   (SAB, D&C x1)   Chlamydia , (-) ZAYDA status post tx  Pt denies any hx of fibroids, endometriosis, known ovarian cyst, or abnormal pap smears      PMH: chronic Hep B  SurgHx: D&C x1  SocHx: denies A/T/D use   Meds: PNV  All: Denies                                        Vital Signs Last 24 Hrs  T(C): 36.9 (2022 14:14), Max: 36.9 (2022 14:10)  T(F): 98.4 (2022 14:14), Max: 98.42 (2022 14:10)  HR: 86 (2022 17:05) (79 - 94)  BP: 110/71 (2022 14:14) (110/71 - 110/71)  BP(mean): --  RR: 17 (2022 14:14) (17 - 17)  SpO2: 100% (2022 17:05) (94% - 100%)    PE:   GEN: NAD  PULM: Normal work of breathing   ABD: soft, non-tender, gravid       TOCO: not augustin     LABS:    CBC and UA pending

## 2022-06-02 NOTE — OB PROVIDER TRIAGE NOTE - HISTORY OF PRESENT ILLNESS
26y  @ 20w4d referred to L&D from ATU for finding of CL 0.8-1.3 with funneling.  Patient last seen on L&D 5 days ago, at that time had spotting and was found to have a short cervix and was started on vaginal progesterone at that time.  She returned today for a follow up cervical length and was found to be further shortened.  On physical exam, cervix closed visually, on digital exam external os 0.5 cm dilated with closed internal os.  Patient     ATU (): CL 0.3-1/3 cm w/ funneling, EFW 369g    OB/GYN HISTORY:     CAT: 10/16/22   (SAB, D&C x1)   Chlamydia , (-) ZAYDA status post tx  Pt denies any hx of fibroids, endometriosis, known ovarian cyst, or abnormal pap smears      PMH: denies   SurgHx: D&C x1  SocHx: denies A/T/D use   Meds: PNV  All: Denies                                       26y  @ 20w4d referred to L&D from ATU for finding of CL 0.8-1.3 with funneling.  Patient last seen on L&D 5 days ago, at that time had spotting and was found to have a short cervix and was started on vaginal progesterone at that time.  She returned today for a follow up cervical length and was found to be further shortened.  On physical exam, cervix closed visually, on digital exam external os 0.5 cm dilated with closed internal os.  Patient with new diagnosis of BV, currently on flagyl, no symptoms.     ATU (): CL 0.3-1/3 cm w/ funneling, EFW 369g    OB/GYN HISTORY:     CAT: 10/16/22   (SAB, D&C x1)   Chlamydia , (-) ZAYDA status post tx  Pt denies any hx of fibroids, endometriosis, known ovarian cyst, or abnormal pap smears      PMH: chronic Hep B  SurgHx: D&C x1  SocHx: denies A/T/D use   Meds: PNV  All: Denies

## 2022-06-02 NOTE — OB PROVIDER TRIAGE NOTE - NSOBPROVIDERNOTE_OBGYN_ALL_OB_FT
26y  @ 20w4d referred to L&D from ATU for finding of CL 0.8-1.3 with funneling    Patient was counseled on options of expectant management vs. cerclage placement vs. completing treatment of BV then re-evaluating for placement of cerclage.  We discussed the possibilities of a short cervix due to cervical incompetency, underlying infection or fetal anomaly.  We discussed that patient previously had chlamydia in this pregnancy as well as current BV which may be the underlying cause of the cervical shortening, however 26y  @ 20w4d referred to L&D from ATU for finding of CL 0.8-1.3 with funneling    Patient was counseled on options of expectant management vs. cerclage placement vs. completing treatment of BV then re-evaluating for placement of cerclage.  We discussed the possibilities of a short cervix due to cervical incompetency, underlying infection or fetal anomaly.  We discussed that patient previously had chlamydia in this pregnancy as well as current BV which may be the underlying cause of the cervical shortening, however cannot determine if that is the cause of cervical shortening.  We discussed that a cerclage is only useful in the setting of cervical incompetency and will not only not help but worsen the situation if infection is present.  We discussed in the situation of underlying infection, the cervix will begin to dilate and with a cerclage in place can cause damage to the cervix.  All questions were answered and after further consideration and discussion patient elected to complete a course of treatment for BV and return next week for cerclage placement.    Pt seen w/ Dr. Ceballos and Dr. Michael Rodriguez  [facility] [place] PGY3

## 2022-06-02 NOTE — OB PROVIDER TRIAGE NOTE - NSHPPHYSICALEXAM_GEN_ALL_CORE
Vital Signs Last 24 Hrs  T(C): 36.9 (02 Jun 2022 14:14), Max: 36.9 (02 Jun 2022 14:10)  T(F): 98.4 (02 Jun 2022 14:14), Max: 98.42 (02 Jun 2022 14:10)  HR: 91 (02 Jun 2022 15:55) (79 - 94)  BP: 110/71 (02 Jun 2022 14:14) (110/71 - 110/71)  BP(mean): --  RR: 17 (02 Jun 2022 14:14) (17 - 17)  SpO2: 100% (02 Jun 2022 15:55) (94% - 100%)    Gen: NAD  CV: RRR  Pulm: nonlabored breathing on room air  Abd: soft, gravid, nontender    ATU TVUS: 0.8-1.3 cm w/ funneling  SVE by Dr. Andrade: external os 0.5 cm dilated, internal os closed, cervix soft in mid position  SSE: visually closed

## 2022-06-04 ENCOUNTER — APPOINTMENT (OUTPATIENT)
Dept: ANTEPARTUM | Facility: CLINIC | Age: 27
End: 2022-06-04

## 2022-06-06 ENCOUNTER — TRANSCRIPTION ENCOUNTER (OUTPATIENT)
Age: 27
End: 2022-06-06

## 2022-06-07 ENCOUNTER — OUTPATIENT (OUTPATIENT)
Dept: OUTPATIENT SERVICES | Facility: HOSPITAL | Age: 27
LOS: 1 days | End: 2022-06-07
Payer: COMMERCIAL

## 2022-06-07 ENCOUNTER — TRANSCRIPTION ENCOUNTER (OUTPATIENT)
Age: 27
End: 2022-06-07

## 2022-06-07 VITALS — WEIGHT: 201.06 LBS | HEIGHT: 66 IN

## 2022-06-07 VITALS
RESPIRATION RATE: 18 BRPM | HEART RATE: 82 BPM | SYSTOLIC BLOOD PRESSURE: 107 MMHG | DIASTOLIC BLOOD PRESSURE: 76 MMHG | OXYGEN SATURATION: 99 %

## 2022-06-07 DIAGNOSIS — O34.32 MATERNAL CARE FOR CERVICAL INCOMPETENCE, SECOND TRIMESTER: ICD-10-CM

## 2022-06-07 LAB — SARS-COV-2 RNA SPEC QL NAA+PROBE: SIGNIFICANT CHANGE UP

## 2022-06-07 PROCEDURE — 87635 SARS-COV-2 COVID-19 AMP PRB: CPT

## 2022-06-07 PROCEDURE — 59320 REVISION OF CERVIX: CPT

## 2022-06-07 RX ORDER — INDOMETHACIN 50 MG
1 CAPSULE ORAL
Qty: 7 | Refills: 0
Start: 2022-06-07

## 2022-06-07 RX ORDER — SODIUM CHLORIDE 9 MG/ML
1000 INJECTION, SOLUTION INTRAVENOUS
Refills: 0 | Status: DISCONTINUED | OUTPATIENT
Start: 2022-06-07 | End: 2022-06-21

## 2022-06-07 RX ORDER — FAMOTIDINE 10 MG/ML
20 INJECTION INTRAVENOUS ONCE
Refills: 0 | Status: COMPLETED | OUTPATIENT
Start: 2022-06-07 | End: 2022-06-07

## 2022-06-07 RX ORDER — INDOMETHACIN 50 MG
50 CAPSULE ORAL ONCE
Refills: 0 | Status: COMPLETED | OUTPATIENT
Start: 2022-06-07 | End: 2022-06-07

## 2022-06-07 RX ORDER — SODIUM CHLORIDE 9 MG/ML
1000 INJECTION, SOLUTION INTRAVENOUS ONCE
Refills: 0 | Status: COMPLETED | OUTPATIENT
Start: 2022-06-07 | End: 2022-06-07

## 2022-06-07 RX ORDER — CITRIC ACID/SODIUM CITRATE 300-500 MG
15 SOLUTION, ORAL ORAL ONCE
Refills: 0 | Status: COMPLETED | OUTPATIENT
Start: 2022-06-07 | End: 2022-06-07

## 2022-06-07 RX ADMIN — SODIUM CHLORIDE 125 MILLILITER(S): 9 INJECTION, SOLUTION INTRAVENOUS at 10:43

## 2022-06-07 RX ADMIN — FAMOTIDINE 20 MILLIGRAM(S): 10 INJECTION INTRAVENOUS at 09:42

## 2022-06-07 RX ADMIN — Medication 50 MILLIGRAM(S): at 12:36

## 2022-06-07 RX ADMIN — SODIUM CHLORIDE 1000 MILLILITER(S): 9 INJECTION, SOLUTION INTRAVENOUS at 09:43

## 2022-06-07 RX ADMIN — Medication 15 MILLILITER(S): at 09:42

## 2022-06-07 NOTE — OB PROVIDER TRIAGE NOTE - NSHPPHYSICALEXAM_GEN_ALL_CORE
26y  @ 21w2d w/ known short cervix of CL 0.8-1.3 with funneling presenting for scheduled rescue cerclage    -anesthesia consult  -IV and IVF  -FH check pre and post op  -risks/benefits/alternatives discussed  -consents signed  -for cerclage    Dr. Tyler alcaraz  [facility] PGY3

## 2022-06-07 NOTE — BRIEF OPERATIVE NOTE - NSEVIDENCEINFORABS_GEN_ALL_CORE
From: Katarina Harrell  To: Judge Hafsa MD  Sent: 9/25/2021 4:15 PM EDT  Subject: Test Results Question    Hi Dr. Alice Pool, I have been trying to call your office today. It is not going to an answering service. Jia Annita 6- and I need a covid test. We were exposed thru [employer]. If it could be sent  to [facility]. Kaur's we can go to the urgent care and get that done ASAP.  I will be waiting to hear from you  Any questions please call me [phone]  Thank you,    Hayley Khan
No

## 2022-06-07 NOTE — PRE-ANESTHESIA EVALUATION ADULT - NSANTHADDINFOFT_GEN_ALL_CORE
spinal explained to pt in detail;  risks include but not limited to HA, failure, nv injury .  All questions answered.

## 2022-06-07 NOTE — OB PROVIDER TRIAGE NOTE - HISTORY OF PRESENT ILLNESS
26y  @ 21w2d w/ known short cervix of CL 0.8-1.3 with funneling.  Patient initially seen on L&D w/ spotting and was found to have a short cervix and was started on vaginal progesterone at that time.  She returne for a follow up cervical length and was found to be further shortened.  On physical exam, cervix closed visually, on digital exam external os 0.5 cm dilated with closed internal os.  Patient with new diagnosis of BV, currently on flagyl, no symptoms.  Patient elected to complete course of flagyl for treatment of BV prior to proceeding w/ cerclage.    ATU (): CL 0.3-1/3 cm w/ funneling, EFW 369g    OB/GYN HISTORY:     CAT: 10/16/22   (SAB, D&C x1)   Chlamydia , (-) ZAYDA status post tx  Pt denies any hx of fibroids, endometriosis, known ovarian cyst, or abnormal pap smears      PMH: chronic Hep B  SurgHx: D&C x1  SocHx: denies A/T/D use   Meds: PNV  All: Denies

## 2022-06-07 NOTE — BRIEF OPERATIVE NOTE - NSICDXBRIEFPROCEDURE_GEN_ALL_CORE_FT
PROCEDURES:  Ki cerclage of cervix during pregnancy by vaginal approach 07-Jun-2022 11:58:32  Bina Rizo

## 2022-06-07 NOTE — OB RN PATIENT PROFILE - INSTRUCTED TO PATIENT: IF THE INFANT IS NOT PINK DURING SKIN TO SKIN, THE PARENTS IS TO SEEK ASSISTANCE IMMEDIATELY.
[FreeTextEntry1] : Pt presents with Otitis Media resolved. He is eating, drinking and voiding well and back to behaving as per usual. No need for further follow up. All questions answered.\par  Statement Selected

## 2022-06-07 NOTE — OB RN INTRAOPERATIVE NOTE - NSOBSELHIDDEN_OBGYN_ALL_OB_FT
[NSOBAttendingProcedure1_OBGYN_ALL_OB_FT:[id number]=],[NSRNCirculatorProcedure1_OBGYN_ALL_OB_FT:[id number]==]

## 2022-06-07 NOTE — BRIEF OPERATIVE NOTE - OPERATION/FINDINGS
Cervix subjectively shortened and frible.  Cervix closed visually, no membranes seen.  Cerclage placed just below the bladder reflection w/ closed internal os following placement and approx. 2 cm of cervix distal to the stitch.  FH check post op 151. Cervix subjectively shortened and friable.  Cervix closed visually, no membranes seen.  Cerclage placed just below the bladder reflection w/ closed internal os following placement and approximately 2 cm of cervix distal to the stitch.  FH check post op 151.

## 2022-06-14 ENCOUNTER — APPOINTMENT (OUTPATIENT)
Dept: ANTEPARTUM | Facility: CLINIC | Age: 27
End: 2022-06-14
Payer: COMMERCIAL

## 2022-06-14 ENCOUNTER — ASOB RESULT (OUTPATIENT)
Age: 27
End: 2022-06-14

## 2022-06-14 PROCEDURE — 76815 OB US LIMITED FETUS(S): CPT

## 2022-06-14 PROCEDURE — 76817 TRANSVAGINAL US OBSTETRIC: CPT

## 2022-06-28 ENCOUNTER — ASOB RESULT (OUTPATIENT)
Age: 27
End: 2022-06-28

## 2022-06-28 ENCOUNTER — APPOINTMENT (OUTPATIENT)
Dept: ANTEPARTUM | Facility: CLINIC | Age: 27
End: 2022-06-28

## 2022-06-28 PROCEDURE — 76817 TRANSVAGINAL US OBSTETRIC: CPT

## 2022-06-28 PROCEDURE — 76816 OB US FOLLOW-UP PER FETUS: CPT

## 2022-08-05 ENCOUNTER — APPOINTMENT (OUTPATIENT)
Dept: HEPATOLOGY | Facility: CLINIC | Age: 27
End: 2022-08-05

## 2022-08-05 VITALS
TEMPERATURE: 97.8 F | RESPIRATION RATE: 14 BRPM | SYSTOLIC BLOOD PRESSURE: 135 MMHG | OXYGEN SATURATION: 99 % | DIASTOLIC BLOOD PRESSURE: 89 MMHG | HEIGHT: 66.75 IN | WEIGHT: 200 LBS | BODY MASS INDEX: 31.39 KG/M2 | HEART RATE: 89 BPM

## 2022-08-05 DIAGNOSIS — A74.9 CHLAMYDIAL INFECTION, UNSPECIFIED: ICD-10-CM

## 2022-08-05 DIAGNOSIS — Z11.3 ENCOUNTER FOR SCREENING FOR INFECTIONS WITH A PREDOMINANTLY SEXUAL MODE OF TRANSMISSION: ICD-10-CM

## 2022-08-05 DIAGNOSIS — Z86.19 PERSONAL HISTORY OF OTHER INFECTIOUS AND PARASITIC DISEASES: ICD-10-CM

## 2022-08-05 PROCEDURE — 99214 OFFICE O/P EST MOD 30 MIN: CPT

## 2022-08-06 PROBLEM — Z86.19 HISTORY OF TRICHOMONIASIS: Status: RESOLVED | Noted: 2022-08-06 | Resolved: 2022-08-06

## 2022-08-06 PROBLEM — Z11.3 SCREEN FOR STD (SEXUALLY TRANSMITTED DISEASE): Status: RESOLVED | Noted: 2020-08-31 | Resolved: 2022-08-06

## 2022-08-06 PROBLEM — A74.9 CHLAMYDIA INFECTION: Status: RESOLVED | Noted: 2020-09-02 | Resolved: 2022-08-06

## 2022-08-06 LAB
ALBUMIN SERPL ELPH-MCNC: 3.7 G/DL
ALP BLD-CCNC: 73 U/L
ALT SERPL-CCNC: 15 U/L
ANION GAP SERPL CALC-SCNC: 11 MMOL/L
AST SERPL-CCNC: 15 U/L
BILIRUB SERPL-MCNC: <0.2 MG/DL
BUN SERPL-MCNC: 10 MG/DL
CALCIUM SERPL-MCNC: 9.4 MG/DL
CHLORIDE SERPL-SCNC: 104 MMOL/L
CO2 SERPL-SCNC: 22 MMOL/L
CREAT SERPL-MCNC: 0.76 MG/DL
EGFR: 109 ML/MIN/1.73M2
GLUCOSE SERPL-MCNC: 118 MG/DL
POTASSIUM SERPL-SCNC: 4.1 MMOL/L
PROT SERPL-MCNC: 6.4 G/DL
SODIUM SERPL-SCNC: 137 MMOL/L

## 2022-08-06 RX ORDER — PNV NO.95/FERROUS FUM/FOLIC AC 28MG-0.8MG
TABLET ORAL
Refills: 0 | Status: ACTIVE | COMMUNITY

## 2022-08-06 NOTE — CONSULT LETTER
[Dear  ___] : Dear  [unfilled], [Consult Letter:] : I had the pleasure of evaluating your patient, [unfilled]. [Please see my note below.] : Please see my note below. [Consult Closing:] : Thank you very much for allowing me to participate in the care of this patient.  If you have any questions, please do not hesitate to contact me. [FreeTextEntry2] : [facility] [place] [FreeTextEntry1] : She has chronic HBV without HIV or HCV coinfection and with normal liver chemistries without signs of cirrhosis. Her most recent HBV viral load was 2,203 IU/mL (22) and will be re-checked today and in 4 weeks to assess for need for tenofovir during this third trimester of pregnancy to decrease risk of maternal-to-child transmission. Antiviral therapy will be deferred if her viral load remains <200,000 IU/mL.\par \par Regardless of whether she receives antiviral therapy, vaginal delivery is not contraindicated and her  should receive immunoprophylaxis with HBV vaccination and hepatitis B immune globulin (HBIG), injected into contralateral feet and both received within <12 hours after delivery. Her  should then complete the remainder of the HBV vaccination series with his pediatrician. Breastfeeding is safe unless nipples are cracked or bleeding (should "pump and dump" until they heal). [FreeTextEntry3] : Sincerely,\par \par Erich Dick M.D., Ph.D.\par Director, Women's Liver Health Program, [facility]\par Transplant Hepatology, [facility]\par  of Medicine\par [employer] [facility] [employer] [employer] [facility]\par [address]\[place] [address]\par Tel: [phone]\par Fax: [phone]\par Cell: [phone]\par E-mail: [email or web address]

## 2022-08-06 NOTE — ASSESSMENT
[FreeTextEntry1] : 25 yo  F currently with an IUP at 29 weeks and 5 days gestational age (LMP 22 and CAT 10/16/22) complicated by cervical shortening (s/p cervical cerclage 22) who is being seen for follow-up of chronic HBV (HBsAg+, HBcIgM-, HBcAb+, HBsAb-, HBeAg-, HBeAb+, with HBV PCR 10,675 IU/mL on 3/17/22 and 2,203 IU/mL on 22, treatment naive). She does not have HIV or HCV coinfection.\par \par We discussed the natural history of hepatitis B virus (HBV) infection and modes of transmission. We discussed that based on her labs, it is not possible to ascertain how she acquired the infection in the past. Possibilities include  transmission from her mother, childhood exposure such as from vaccinations if done using a shared needle, or adult exposure through sexual activity. I advised her to discuss her HBV diagnosis with her immediate family as her , parents, and siblings should all be tested for HBV so that they can undergo surveillance or treatment if they have chronic HBV or be vaccinated if non-immune.\par \par We discussed the staging of chronic HBV. Her liver tests are currently within normal limits. Nevertheless, as there can be waxing and waning immune activity against the virus leading to chronic liver injury, I recommend FibroScan for noninvasive assessment for hepatic fibrosis. Although FibroScan has been studied and is safe during pregnancy, I recommended that we defer FibroScan testing until she is postpartum to ensure the most reliable result.\par \par Abdominal ultrasound was previously ordered and I will reach out to Radiology to assist her in scheduling it to rule out cirrhosis or hepatocellular carcinoma (HCC). However, she does not have any signs of cirrhosis or portal hypertension by history, exam, or laboratory testing including preserved liver synthetic function and normal platelet count.\par \par Repeat labs ordered today to trend her liver chemistries and HBV DNA by PCR (quantitative) as well as to rule out HDV coinfection. AFP (and AFP-L3) not ordered today as will be elevated due to her pregnancy.\par \par We discussed potential maternal risks of progressive liver fibrosis, cirrhosis, and hepatocellular carcinoma (HCC). We discussed that antiviral therapy and suppression of viremia reduces maternal risks of cirrhosis and HCC, and that therapy may be indicated if there are signs of ongoing liver inflammation/injury. Given her normal liver tests, she does not currently have an absolute indication for initiation of long-term antiviral therapy for her own liver health, but will need lifelong monitoring of her liver tests (typically done every 3-6 months unless during high risk periods for hepatitis flare, as discussed below). She should also undergo lifelong surveillance for HCC especially after age 40 with ultrasound and AFP every 6 months.\par \par We discussed chronic HBV infection in pregnancy, and that while there is no evidence to suggest increased risk of adverse pregnancy outcomes in women with chronic HBV infection who do not have advanced liver disease, a major concern of untreated HBV infection is the risk of mother-to-child transmission. The most important risk factors for transmission are HBeAg-positivity (which she does not have based on her prior labs) and high HBV viral load (i.e., high HBV DNA by PCR).\par \par We discussed that every  of a mother who is HBV-positive should receive immunoprophylaxis with HBV vaccination and hepatitis B immune globulin (HBIG), injected into contralateral feet and both received within <12 hours after delivery, to decrease the risk of the infant developing chronic HBV. This should occur regardless of whether the mother is being treated with antiviral therapy. Her  should then complete the HBV vaccination series with his pediatrician. We discussed that vaginal delivery is acceptable, so long as the  receives immunoprophylaxis as above, as there is insufficient evidence that  section further decreases risk of mother-to-child transmission if infant immunoprophylaxis is given. Infants who receive this immunoprophylaxis can also safely be  unless nipples are cracked or bleeding.\par \par In addition, we discussed that antiviral therapy is recommended for pregnant women starting in the third trimester (typically at 32 weeks gestational age) if the HBV viral load is >200,000 IU/mL to decrease the risk of mother-to-child transmission. We will re-check her HBV DNA by PCR (quantitative) today and again in 4 weeks (also ordered today) in case she has rising viremia that could necessitate antiviral therapy initiation antepartum.\par \par We discussed that tenofovir disoproxil fumarate (TDF) is the preferred choice if antiviral therapy is used during pregnancy due to antiviral potency, very low risk for resistance, clinical trial evidence confirming decrease risk of mother-to-child transmission with TDF, and no difference in rates of prematurity, congenital malformations, or Apgar scores with TDF. Additional clinical studies also have found no increase in adverse events among TDF exposed and unexposed infants. We discussed that although antiviral drug labels do not recommend breastfeeding while using the medications, clinical studies support the safety of TDF during breastfeeding.\par \par When she returns for follow-up 4-6 weeks postpartum, we will re-check her liver tests and obtain a FibroScan to assess for any hepatic fibrosis related to her chronic HBV. If she ends up requiring TDF during this third trimester due to increased viremia (as above), then her labs and FibroScan postpartum will help us to determine the need for continued antiviral therapy for her own liver health. If the labs and FibroScan are reassuring, then her TDF will likely be discontinued at that time. We discussed that after TDF discontinuation, there is an increased risk (approximately 25%) of hepatitis flare postpartum for up to 6 months that would require monthly monitoring of her liver tests. After 6 months postpartum, if her liver tests all remain reassuring, then her laboratory checks can be spaced to every 3-6 months long-term.\par \par In brief summary, her HBV viral load will be monitored today and in 4 weeks to assess for need for TDF during the third trimester of pregnancy to decrease risk of maternal-to-child transmission. Antiviral therapy will be deferred if her viral load remains <200,000 IU/mL. Regardless of whether she receives antiviral therapy, vaginal delivery is not contraindicated and her  should receive immunoprophylaxis with HBV vaccination and hepatitis B immune globulin (HBIG), injected into contralateral feet and both received within <12 hours after delivery. Her  should then complete the remainder of the HBV vaccination series with his pediatrician. Breastfeeding is safe unless nipples are cracked or bleeding (should "pump and dump" until they heal).\par \par Next follow-up: 4 months (postpartum), or earlier if needed.

## 2022-08-06 NOTE — HISTORY OF PRESENT ILLNESS
[Tattoo] : tattoo(s) [Body Piercing] : body piercing [IV Drug Use] : no IV drug use [Hemodialysis] : no hemodialysis [Transfusion before 1992] : no transfusion before 1992 [Transplant before 1992] : no transplant before 1992 [Incarceration] : no incarceration [Alcohol Abuse] : no alcohol abuse [de-identified] : Ms. Patterson is a very pleasant 25 yo  F currently with an IUP at 29 weeks and 5 days gestational age (LMP 22 and CAT 10/16/22) complicated by cervical shortening (s/p cervical cerclage 22) who is being seen for follow-up of chronic HBV (HBsAg+, HBcIgM-, HBcAb+, HBsAb-, HBeAg-, HBeAb+, with HBV PCR 10,675 IU/mL on 3/17/22 and 2,203 IU/mL on 22, treatment naive). She does not have HIV or HCV coinfection. \par \par PCP: Dr. Varela (in [place])\par OB: Dr. Kiersten Latif ([facility] [place])\par \par She reports feeling well today including after her cervical cerclage procedure 2 months ago. She is having a boy.\par \par She says that she was unaware of her HBV until she had labs done during this pregnancy. She is treatment naive. She has no prior history of jaundice or scleral icterus. She reports that she tried to get the abdominal ultrasound ordered at her last visit in May done that same day at [facility] ([address]) but was told that they could not do it because of her pregnancy.\par \par She has no known family history of HBV, other liver disease, or liver cancer, but is also unsure if her parents or siblings have been tested. Her parents, two older sisters, and one younger sister all live in [place] now too. Her  is planning to see his PCP to get tested for HBV but has not done so yet. He was also born in [facility] and they are unsure if he was vaccinated for HBV previously.\par \par She was born in [facility]. She is  and lives with her  in [place]. She reports that they are monogamous to her knowledge. She has only had male sexual partners in the past. She has a history of treatment for STIs (chlamydia and trichomonas). She previously had a naval piercing (since removed) done at age 17 professionally. She has two professional tattoos: one on her left wrist (done in ) and one on her left shoulder (done in 10/2020). She denies any history of intranasal or IV drug use.

## 2022-08-08 ENCOUNTER — NON-APPOINTMENT (OUTPATIENT)
Age: 27
End: 2022-08-08

## 2022-08-08 LAB
HBV DNA # SERPL NAA+PROBE: 1801 IU/ML
HEPB DNA PCR INT: DETECTED
HEPB DNA PCR LOG: 3.26 LOGIU/ML

## 2022-08-13 ENCOUNTER — INPATIENT (INPATIENT)
Facility: HOSPITAL | Age: 27
LOS: 1 days | Discharge: HOME | End: 2022-08-15
Attending: OBSTETRICS & GYNECOLOGY | Admitting: OBSTETRICS & GYNECOLOGY
Payer: COMMERCIAL

## 2022-08-13 VITALS — OXYGEN SATURATION: 100 % | HEART RATE: 80 BPM

## 2022-08-13 DIAGNOSIS — Z3A.00 WEEKS OF GESTATION OF PREGNANCY NOT SPECIFIED: ICD-10-CM

## 2022-08-13 DIAGNOSIS — O26.899 OTHER SPECIFIED PREGNANCY RELATED CONDITIONS, UNSPECIFIED TRIMESTER: ICD-10-CM

## 2022-08-13 DIAGNOSIS — Z34.80 ENCOUNTER FOR SUPERVISION OF OTHER NORMAL PREGNANCY, UNSPECIFIED TRIMESTER: ICD-10-CM

## 2022-08-13 LAB
ALBUMIN SERPL ELPH-MCNC: 3.3 G/DL — SIGNIFICANT CHANGE UP (ref 3.3–5)
ALBUMIN SERPL ELPH-MCNC: 4 G/DL — SIGNIFICANT CHANGE UP (ref 3.3–5)
ALBUMIN SERPL ELPH-MCNC: 4 G/DL — SIGNIFICANT CHANGE UP (ref 3.3–5)
ALP SERPL-CCNC: 115 U/L — SIGNIFICANT CHANGE UP (ref 40–120)
ALP SERPL-CCNC: 116 U/L — SIGNIFICANT CHANGE UP (ref 40–120)
ALP SERPL-CCNC: 88 U/L — SIGNIFICANT CHANGE UP (ref 40–120)
ALT FLD-CCNC: 14 U/L — SIGNIFICANT CHANGE UP (ref 10–45)
ALT FLD-CCNC: 17 U/L — SIGNIFICANT CHANGE UP (ref 10–45)
ALT FLD-CCNC: 18 U/L — SIGNIFICANT CHANGE UP (ref 10–45)
AMPHET UR-MCNC: NEGATIVE — SIGNIFICANT CHANGE UP
ANION GAP SERPL CALC-SCNC: 12 MMOL/L — SIGNIFICANT CHANGE UP (ref 5–17)
ANION GAP SERPL CALC-SCNC: 12 MMOL/L — SIGNIFICANT CHANGE UP (ref 5–17)
ANION GAP SERPL CALC-SCNC: 17 MMOL/L — SIGNIFICANT CHANGE UP (ref 5–17)
APPEARANCE UR: ABNORMAL
APTT BLD: 26.4 SEC — LOW (ref 27.5–35.5)
APTT BLD: 27.6 SEC — SIGNIFICANT CHANGE UP (ref 27.5–35.5)
APTT BLD: 30 SEC — SIGNIFICANT CHANGE UP (ref 27.5–35.5)
AST SERPL-CCNC: 26 U/L — SIGNIFICANT CHANGE UP (ref 10–40)
AST SERPL-CCNC: 31 U/L — SIGNIFICANT CHANGE UP (ref 10–40)
AST SERPL-CCNC: 34 U/L — SIGNIFICANT CHANGE UP (ref 10–40)
BACTERIA # UR AUTO: NEGATIVE — SIGNIFICANT CHANGE UP
BARBITURATES UR SCN-MCNC: NEGATIVE — SIGNIFICANT CHANGE UP
BASOPHILS # BLD AUTO: 0 K/UL — SIGNIFICANT CHANGE UP (ref 0–0.2)
BASOPHILS # BLD AUTO: 0.03 K/UL — SIGNIFICANT CHANGE UP (ref 0–0.2)
BASOPHILS NFR BLD AUTO: 0 % — SIGNIFICANT CHANGE UP (ref 0–2)
BASOPHILS NFR BLD AUTO: 0.1 % — SIGNIFICANT CHANGE UP (ref 0–2)
BENZODIAZ UR-MCNC: NEGATIVE — SIGNIFICANT CHANGE UP
BILIRUB SERPL-MCNC: 0.3 MG/DL — SIGNIFICANT CHANGE UP (ref 0.2–1.2)
BILIRUB SERPL-MCNC: 0.6 MG/DL — SIGNIFICANT CHANGE UP (ref 0.2–1.2)
BILIRUB SERPL-MCNC: 0.9 MG/DL — SIGNIFICANT CHANGE UP (ref 0.2–1.2)
BILIRUB UR-MCNC: NEGATIVE — SIGNIFICANT CHANGE UP
BLD GP AB SCN SERPL QL: NEGATIVE — SIGNIFICANT CHANGE UP
BUN SERPL-MCNC: 11 MG/DL — SIGNIFICANT CHANGE UP (ref 7–23)
BUN SERPL-MCNC: 14 MG/DL — SIGNIFICANT CHANGE UP (ref 7–23)
BUN SERPL-MCNC: 15 MG/DL — SIGNIFICANT CHANGE UP (ref 7–23)
CALCIUM SERPL-MCNC: 8.6 MG/DL — SIGNIFICANT CHANGE UP (ref 8.4–10.5)
CALCIUM SERPL-MCNC: 9.5 MG/DL — SIGNIFICANT CHANGE UP (ref 8.4–10.5)
CALCIUM SERPL-MCNC: 9.6 MG/DL — SIGNIFICANT CHANGE UP (ref 8.4–10.5)
CHLORIDE SERPL-SCNC: 103 MMOL/L — SIGNIFICANT CHANGE UP (ref 96–108)
CHLORIDE SERPL-SCNC: 104 MMOL/L — SIGNIFICANT CHANGE UP (ref 96–108)
CHLORIDE SERPL-SCNC: 105 MMOL/L — SIGNIFICANT CHANGE UP (ref 96–108)
CO2 SERPL-SCNC: 18 MMOL/L — LOW (ref 22–31)
CO2 SERPL-SCNC: 19 MMOL/L — LOW (ref 22–31)
CO2 SERPL-SCNC: 20 MMOL/L — LOW (ref 22–31)
COCAINE METAB.OTHER UR-MCNC: NEGATIVE — SIGNIFICANT CHANGE UP
COLOR SPEC: ABNORMAL
COVID-19 SPIKE DOMAIN AB INTERP: POSITIVE
COVID-19 SPIKE DOMAIN ANTIBODY RESULT: >250 U/ML — HIGH
CREAT ?TM UR-MCNC: 520 MG/DL — SIGNIFICANT CHANGE UP
CREAT SERPL-MCNC: 1.1 MG/DL — SIGNIFICANT CHANGE UP (ref 0.5–1.3)
CREAT SERPL-MCNC: 1.16 MG/DL — SIGNIFICANT CHANGE UP (ref 0.5–1.3)
CREAT SERPL-MCNC: 1.26 MG/DL — SIGNIFICANT CHANGE UP (ref 0.5–1.3)
DIFF PNL FLD: ABNORMAL
EGFR: 60 ML/MIN/1.73M2 — SIGNIFICANT CHANGE UP
EGFR: 66 ML/MIN/1.73M2 — SIGNIFICANT CHANGE UP
EGFR: 70 ML/MIN/1.73M2 — SIGNIFICANT CHANGE UP
EOSINOPHIL # BLD AUTO: 0 K/UL — SIGNIFICANT CHANGE UP (ref 0–0.5)
EOSINOPHIL # BLD AUTO: 0.19 K/UL — SIGNIFICANT CHANGE UP (ref 0–0.5)
EOSINOPHIL NFR BLD AUTO: 0 % — SIGNIFICANT CHANGE UP (ref 0–6)
EOSINOPHIL NFR BLD AUTO: 0.9 % — SIGNIFICANT CHANGE UP (ref 0–6)
EPI CELLS # UR: 10 /HPF — HIGH
FIBRINOGEN PPP-MCNC: 199 MG/DL — LOW (ref 330–520)
FIBRINOGEN PPP-MCNC: 242 MG/DL — LOW (ref 330–520)
FIBRINOGEN PPP-MCNC: 299 MG/DL — LOW (ref 330–520)
GIANT PLATELETS BLD QL SMEAR: PRESENT — SIGNIFICANT CHANGE UP
GLUCOSE SERPL-MCNC: 87 MG/DL — SIGNIFICANT CHANGE UP (ref 70–99)
GLUCOSE SERPL-MCNC: 93 MG/DL — SIGNIFICANT CHANGE UP (ref 70–99)
GLUCOSE SERPL-MCNC: 95 MG/DL — SIGNIFICANT CHANGE UP (ref 70–99)
GLUCOSE UR QL: ABNORMAL
HCT VFR BLD CALC: 26.8 % — LOW (ref 34.5–45)
HCT VFR BLD CALC: 34.8 % — SIGNIFICANT CHANGE UP (ref 34.5–45)
HCT VFR BLD CALC: 36.1 % — SIGNIFICANT CHANGE UP (ref 34.5–45)
HGB BLD-MCNC: 11.1 G/DL — LOW (ref 11.5–15.5)
HGB BLD-MCNC: 11.6 G/DL — SIGNIFICANT CHANGE UP (ref 11.5–15.5)
HGB BLD-MCNC: 8.9 G/DL — LOW (ref 11.5–15.5)
HYALINE CASTS # UR AUTO: 165 /LPF — HIGH (ref 0–2)
IMM GRANULOCYTES NFR BLD AUTO: 1.3 % — SIGNIFICANT CHANGE UP (ref 0–1.5)
INR BLD: 1.11 RATIO — SIGNIFICANT CHANGE UP (ref 0.88–1.16)
INR BLD: 1.13 RATIO — SIGNIFICANT CHANGE UP (ref 0.88–1.16)
INR BLD: 1.14 RATIO — SIGNIFICANT CHANGE UP (ref 0.88–1.16)
KETONES UR-MCNC: NEGATIVE — SIGNIFICANT CHANGE UP
LDH SERPL L TO P-CCNC: 402 U/L — HIGH (ref 50–242)
LDH SERPL L TO P-CCNC: 471 U/L — HIGH (ref 50–242)
LDH SERPL L TO P-CCNC: 515 U/L — HIGH (ref 50–242)
LEUKOCYTE ESTERASE UR-ACNC: NEGATIVE — SIGNIFICANT CHANGE UP
LYMPHOCYTES # BLD AUTO: 1.49 K/UL — SIGNIFICANT CHANGE UP (ref 1–3.3)
LYMPHOCYTES # BLD AUTO: 1.55 K/UL — SIGNIFICANT CHANGE UP (ref 1–3.3)
LYMPHOCYTES # BLD AUTO: 6.9 % — LOW (ref 13–44)
LYMPHOCYTES # BLD AUTO: 7.4 % — LOW (ref 13–44)
MANUAL SMEAR VERIFICATION: SIGNIFICANT CHANGE UP
MCHC RBC-ENTMCNC: 27.1 PG — SIGNIFICANT CHANGE UP (ref 27–34)
MCHC RBC-ENTMCNC: 27.5 PG — SIGNIFICANT CHANGE UP (ref 27–34)
MCHC RBC-ENTMCNC: 27.8 PG — SIGNIFICANT CHANGE UP (ref 27–34)
MCHC RBC-ENTMCNC: 31.9 GM/DL — LOW (ref 32–36)
MCHC RBC-ENTMCNC: 32.1 GM/DL — SIGNIFICANT CHANGE UP (ref 32–36)
MCHC RBC-ENTMCNC: 33.2 GM/DL — SIGNIFICANT CHANGE UP (ref 32–36)
MCV RBC AUTO: 83.8 FL — SIGNIFICANT CHANGE UP (ref 80–100)
MCV RBC AUTO: 84.3 FL — SIGNIFICANT CHANGE UP (ref 80–100)
MCV RBC AUTO: 86.4 FL — SIGNIFICANT CHANGE UP (ref 80–100)
METHADONE UR-MCNC: NEGATIVE — SIGNIFICANT CHANGE UP
MONOCYTES # BLD AUTO: 0.93 K/UL — HIGH (ref 0–0.9)
MONOCYTES # BLD AUTO: 1.01 K/UL — HIGH (ref 0–0.9)
MONOCYTES NFR BLD AUTO: 4.3 % — SIGNIFICANT CHANGE UP (ref 2–14)
MONOCYTES NFR BLD AUTO: 4.8 % — SIGNIFICANT CHANGE UP (ref 2–14)
NEUTROPHILS # BLD AUTO: 18.19 K/UL — HIGH (ref 1.8–7.4)
NEUTROPHILS # BLD AUTO: 18.76 K/UL — HIGH (ref 1.8–7.4)
NEUTROPHILS NFR BLD AUTO: 85.3 % — HIGH (ref 43–77)
NEUTROPHILS NFR BLD AUTO: 86.4 % — HIGH (ref 43–77)
NEUTS BAND # BLD: 1.7 % — SIGNIFICANT CHANGE UP (ref 0–8)
NITRITE UR-MCNC: NEGATIVE — SIGNIFICANT CHANGE UP
NRBC # BLD: 0 /100 WBCS — SIGNIFICANT CHANGE UP (ref 0–0)
NRBC # BLD: 0 /100 WBCS — SIGNIFICANT CHANGE UP (ref 0–0)
OPIATES UR-MCNC: NEGATIVE — SIGNIFICANT CHANGE UP
OXYCODONE UR-MCNC: NEGATIVE — SIGNIFICANT CHANGE UP
PCP SPEC-MCNC: SIGNIFICANT CHANGE UP
PCP UR-MCNC: NEGATIVE — SIGNIFICANT CHANGE UP
PH UR: 6.5 — SIGNIFICANT CHANGE UP (ref 5–8)
PLAT MORPH BLD: NORMAL — SIGNIFICANT CHANGE UP
PLATELET # BLD AUTO: 107 K/UL — LOW (ref 150–400)
PLATELET # BLD AUTO: 116 K/UL — LOW (ref 150–400)
PLATELET # BLD AUTO: 157 K/UL — SIGNIFICANT CHANGE UP (ref 150–400)
POTASSIUM SERPL-MCNC: 3.9 MMOL/L — SIGNIFICANT CHANGE UP (ref 3.5–5.3)
POTASSIUM SERPL-MCNC: 4.4 MMOL/L — SIGNIFICANT CHANGE UP (ref 3.5–5.3)
POTASSIUM SERPL-MCNC: 6 MMOL/L — HIGH (ref 3.5–5.3)
POTASSIUM SERPL-SCNC: 3.9 MMOL/L — SIGNIFICANT CHANGE UP (ref 3.5–5.3)
POTASSIUM SERPL-SCNC: 4.4 MMOL/L — SIGNIFICANT CHANGE UP (ref 3.5–5.3)
POTASSIUM SERPL-SCNC: 6 MMOL/L — HIGH (ref 3.5–5.3)
PROT ?TM UR-MCNC: 392 MG/DL — HIGH (ref 0–12)
PROT SERPL-MCNC: 5.3 G/DL — LOW (ref 6–8.3)
PROT SERPL-MCNC: 6.9 G/DL — SIGNIFICANT CHANGE UP (ref 6–8.3)
PROT SERPL-MCNC: 6.9 G/DL — SIGNIFICANT CHANGE UP (ref 6–8.3)
PROT UR-MCNC: >600
PROT/CREAT UR-RTO: 0.8 RATIO — HIGH (ref 0–0.2)
PROTHROM AB SERPL-ACNC: 12.9 SEC — SIGNIFICANT CHANGE UP (ref 10.5–13.4)
PROTHROM AB SERPL-ACNC: 13.1 SEC — SIGNIFICANT CHANGE UP (ref 10.5–13.4)
PROTHROM AB SERPL-ACNC: 13.2 SEC — SIGNIFICANT CHANGE UP (ref 10.5–13.4)
RBC # BLD: 3.2 M/UL — LOW (ref 3.8–5.2)
RBC # BLD: 4.03 M/UL — SIGNIFICANT CHANGE UP (ref 3.8–5.2)
RBC # BLD: 4.28 M/UL — SIGNIFICANT CHANGE UP (ref 3.8–5.2)
RBC # FLD: 15.2 % — HIGH (ref 10.3–14.5)
RBC # FLD: 15.2 % — HIGH (ref 10.3–14.5)
RBC # FLD: 15.4 % — HIGH (ref 10.3–14.5)
RBC BLD AUTO: SIGNIFICANT CHANGE UP
RBC CASTS # UR COMP ASSIST: 303 /HPF — HIGH (ref 0–4)
RH IG SCN BLD-IMP: POSITIVE — SIGNIFICANT CHANGE UP
SARS-COV-2 IGG+IGM SERPL QL IA: >250 U/ML — HIGH
SARS-COV-2 IGG+IGM SERPL QL IA: POSITIVE
SARS-COV-2 RNA SPEC QL NAA+PROBE: SIGNIFICANT CHANGE UP
SODIUM SERPL-SCNC: 134 MMOL/L — LOW (ref 135–145)
SODIUM SERPL-SCNC: 135 MMOL/L — SIGNIFICANT CHANGE UP (ref 135–145)
SODIUM SERPL-SCNC: 141 MMOL/L — SIGNIFICANT CHANGE UP (ref 135–145)
SP GR SPEC: 1.03 — HIGH (ref 1.01–1.02)
THC UR QL: NEGATIVE — SIGNIFICANT CHANGE UP
URATE SERPL-MCNC: 6.1 MG/DL — SIGNIFICANT CHANGE UP (ref 2.5–7)
URATE SERPL-MCNC: 6.2 MG/DL — SIGNIFICANT CHANGE UP (ref 2.5–7)
URATE SERPL-MCNC: 6.3 MG/DL — SIGNIFICANT CHANGE UP (ref 2.5–7)
UROBILINOGEN FLD QL: NEGATIVE — SIGNIFICANT CHANGE UP
VARIANT LYMPHS # BLD: 0.9 % — SIGNIFICANT CHANGE UP (ref 0–6)
WBC # BLD: 20.99 K/UL — HIGH (ref 3.8–10.5)
WBC # BLD: 21.05 K/UL — HIGH (ref 3.8–10.5)
WBC # BLD: 21.56 K/UL — HIGH (ref 3.8–10.5)
WBC # FLD AUTO: 20.99 K/UL — HIGH (ref 3.8–10.5)
WBC # FLD AUTO: 21.05 K/UL — HIGH (ref 3.8–10.5)
WBC # FLD AUTO: 21.56 K/UL — HIGH (ref 3.8–10.5)
WBC UR QL: 77 /HPF — HIGH (ref 0–5)

## 2022-08-13 PROCEDURE — 88307 TISSUE EXAM BY PATHOLOGIST: CPT | Mod: 26

## 2022-08-13 RX ORDER — MAGNESIUM SULFATE 500 MG/ML
1 VIAL (ML) INJECTION
Qty: 40 | Refills: 0 | Status: DISCONTINUED | OUTPATIENT
Start: 2022-08-13 | End: 2022-08-13

## 2022-08-13 RX ORDER — MAGNESIUM SULFATE 500 MG/ML
2 VIAL (ML) INJECTION
Qty: 40 | Refills: 0 | Status: DISCONTINUED | OUTPATIENT
Start: 2022-08-13 | End: 2022-08-13

## 2022-08-13 RX ORDER — NIFEDIPINE 30 MG
30 TABLET, EXTENDED RELEASE 24 HR ORAL DAILY
Refills: 0 | Status: DISCONTINUED | OUTPATIENT
Start: 2022-08-13 | End: 2022-08-14

## 2022-08-13 RX ORDER — CHLORHEXIDINE GLUCONATE 213 G/1000ML
1 SOLUTION TOPICAL ONCE
Refills: 0 | Status: DISCONTINUED | OUTPATIENT
Start: 2022-08-13 | End: 2022-08-13

## 2022-08-13 RX ORDER — TRANEXAMIC ACID 100 MG/ML
1000 INJECTION, SOLUTION INTRAVENOUS ONCE
Refills: 0 | Status: COMPLETED | OUTPATIENT
Start: 2022-08-13 | End: 2022-08-13

## 2022-08-13 RX ORDER — CITRIC ACID/SODIUM CITRATE 300-500 MG
15 SOLUTION, ORAL ORAL EVERY 6 HOURS
Refills: 0 | Status: DISCONTINUED | OUTPATIENT
Start: 2022-08-13 | End: 2022-08-13

## 2022-08-13 RX ORDER — SODIUM CHLORIDE 9 MG/ML
1000 INJECTION, SOLUTION INTRAVENOUS
Refills: 0 | Status: DISCONTINUED | OUTPATIENT
Start: 2022-08-13 | End: 2022-08-13

## 2022-08-13 RX ORDER — MAGNESIUM SULFATE 500 MG/ML
4 VIAL (ML) INJECTION ONCE
Refills: 0 | Status: COMPLETED | OUTPATIENT
Start: 2022-08-13 | End: 2022-08-13

## 2022-08-13 RX ORDER — OXYTOCIN 10 UNIT/ML
4 VIAL (ML) INJECTION
Qty: 30 | Refills: 0 | Status: DISCONTINUED | OUTPATIENT
Start: 2022-08-13 | End: 2022-08-13

## 2022-08-13 RX ORDER — OXYTOCIN 10 UNIT/ML
333.33 VIAL (ML) INJECTION
Qty: 20 | Refills: 0 | Status: COMPLETED | OUTPATIENT
Start: 2022-08-13 | End: 2022-08-13

## 2022-08-13 RX ORDER — CARBOPROST TROMETHAMINE 250 UG/ML
250 INJECTION, SOLUTION INTRAMUSCULAR ONCE
Refills: 0 | Status: COMPLETED | OUTPATIENT
Start: 2022-08-13 | End: 2022-08-13

## 2022-08-13 RX ORDER — DIPHENOXYLATE HCL/ATROPINE 2.5-.025MG
2 TABLET ORAL ONCE
Refills: 0 | Status: DISCONTINUED | OUTPATIENT
Start: 2022-08-13 | End: 2022-08-13

## 2022-08-13 RX ADMIN — Medication 1000 MILLIUNIT(S)/MIN: at 21:05

## 2022-08-13 RX ADMIN — Medication 30 MILLIGRAM(S): at 21:56

## 2022-08-13 RX ADMIN — Medication 300 GRAM(S): at 17:44

## 2022-08-13 RX ADMIN — Medication 25 GM/HR: at 18:09

## 2022-08-13 RX ADMIN — CARBOPROST TROMETHAMINE 250 MICROGRAM(S): 250 INJECTION, SOLUTION INTRAMUSCULAR at 19:40

## 2022-08-13 RX ADMIN — SODIUM CHLORIDE 125 MILLILITER(S): 9 INJECTION, SOLUTION INTRAVENOUS at 15:19

## 2022-08-13 RX ADMIN — Medication 2 TABLET(S): at 19:45

## 2022-08-13 RX ADMIN — Medication 4 MILLIUNIT(S)/MIN: at 15:15

## 2022-08-13 RX ADMIN — TRANEXAMIC ACID 220 MILLIGRAM(S): 100 INJECTION, SOLUTION INTRAVENOUS at 20:00

## 2022-08-13 NOTE — OB PROVIDER H&P - ATTENDING COMMENTS
HPI: 25yo  @30+6 presents for chief concern of painful contractions since 3am. Patient is unsure whether she has had LOF, but reports that she has been voiding frequently recently. She reports normal fetal movement, last felt FM this morning at 5am. Denies vaginal bleeding, abnormal vaginal discharge, fevers, chills, chest pain, SOB, n/v/d, or constipation. Cerclage placed 22 for cervical shortening. Pt has been on vaginal progesterone.   Pt has h/o recurrent BV and chlamydia-there were issues with partner compliance with treatment for chlamydia    Pt had 2 severe range BP more than 2 hours apart    She is appropriately sad and tearful    Afebrile    Uterus NT  VE: Cerclage removal done-2 side wall retractors placed to visualize her suture and scissors used to cut the visualized stitch at 12 o'clock of prolene. Pt tolerated the procedure well. VE  after cerclage removal.  Sono confirmed no Fetal heart, vtx and oligohydramnios  toco: irritability    A/P: IUFD at 30 weeks 6/7 days  -GBS collected, will collect Gc/Chlamydia in urine when she is voiding after delivery  -Herzog placed after epidural  -cerclage removed after epidural  -Urine tox screen  -Coags and PIH labs  -Monitor BP- gestational hypertension  -will send placenta to path and culture it   -TORCH titers  -Thrombophilia labs postpartum as outpatient  -consents to be signed and SW consult/bereavement  -Emotional support  Olga Latif MD

## 2022-08-13 NOTE — OB PROVIDER H&P - ASSESSMENT
Assessment  25yo  presents with IUFD @30w6d. Patient endorses pelvic cramping/contractions, denies any other symptoms. Patient hemodynamically stable.     Plan  1. Admit to L&D for IOL  2. Will examine patient and remove cerclage  3. TORCH titers sent, routine labs  4. CLD  5. GBS swab collected    Patient discussed and evaluated with attending physician, Dr. Latif.  Lian Dean MD PGY3

## 2022-08-13 NOTE — OB PROVIDER H&P - HISTORY OF PRESENT ILLNESS
Admission H&P    Subjective  HPI: 25yo  @30+6 presents for chief concern of painful contractions since 3am. Patient is unsure whether she has had LOF, but reports that she has been voiding frequently recently. She reports normal fetal movement, last felt FM this morning at 5am. Denies vaginal bleeding, abnormal vaginal discharge, fevers, chills, chest pain, SOB, n/v/d, or constipation.    – PNC: c/b cervical shortening s/p cerclage placement 22. GBS unknown.  EFW 1900g by deepak.  – OBHx: 2017- SAB no D&C  – GynHx: h/o recurrent BV, chlamydia s/p multiple therapies, denies h/o other STIs, ovarian cysts, or fibroids  – PMH: chronic Hepatitis B  – PSH: denies  – Psych: denies   – Social: denies   – Meds: PNV   – Allergies: NKDA  – Will accept blood transfusions? Yes

## 2022-08-13 NOTE — OB PROVIDER LABOR PROGRESS NOTE - NS_SUBJECTIVE/OBJECTIVE_OBGYN_ALL_OB_FT
Pt feels well
Update
Lab update
Patient seen and evaluated at the bedside for cerclage removal. Patient has Emerson cerclage in situ since 6/7/22. Patient now comfortable with epidural in situ.     Vital Signs Last 24 Hrs  T(C): 36.8 (13 Aug 2022 12:12), Max: 36.8 (13 Aug 2022 11:03)  T(F): 98.2 (13 Aug 2022 12:12), Max: 98.24 (13 Aug 2022 11:03)  HR: 81 (13 Aug 2022 14:51) (64 - 107)  BP: 139/94 (13 Aug 2022 14:51) (114/79 - 170/100)  BP(mean): --  RR: 20 (13 Aug 2022 12:12) (20 - 20)  SpO2: 100% (13 Aug 2022 14:47) (99% - 100%)    Parameters below as of 13 Aug 2022 12:12  Patient On (Oxygen Delivery Method): room air
Pt is comfortable

## 2022-08-13 NOTE — CONSULT NOTE ADULT - SUBJECTIVE AND OBJECTIVE BOX
SICU Consultation Note  =====================================================    HPI: 27yo  @30+6 presents for chief concern of painful contractions since 3am. Patient is unsure whether she has had LOF, but reports that she has been voiding frequently recently. She reports normal fetal movement, last felt FM this morning at 5am. Denies vaginal bleeding, abnormal vaginal discharge, fevers, chills, chest pain, SOB, n/v/d, or constipation.    – PNC: c/b cervical shortening s/p cerclage placement 22. GBS unknown.  EFW 1900g by deepak.  – OBHx: 2017- SAB no D&C  – GynHx: h/o recurrent BV, chlamydia s/p multiple therapies, denies h/o other STIs, ovarian cysts, or fibroids  – PMH: chronic Hepatitis B  – PSH: denies  – Psych: denies   – Social: denies   – Meds: PNV   – Allergies: NKDA  – Will accept blood transfusions? Yes (13 Aug 2022 11:34)      Surgery Information  OR time:      EBL:          IV Fluids:       Blood Products:   UOP:          PAST MEDICAL & SURGICAL HISTORY:    Home Meds: Home Medications:    Allergies: Allergies    No Known Allergies    Intolerances      Soc:   Advanced Directives: Presumed Full Code     ROS:    REVIEW OF SYSTEMS    [ ] A ten-point review of systems was otherwise negative except as noted.  [ ] Due to altered mental status/intubation, subjective information were not able to be obtained from the patient. History was obtained, to the extent possible, from review of the chart and collateral sources of information.      CURRENT MEDICATIONS:   --------------------------------------------------------------------------------------  Neurologic Medications    Respiratory Medications    Cardiovascular Medications  NIFEdipine XL 30 milliGRAM(s) Oral daily    Gastrointestinal Medications    Genitourinary Medications    Hematologic/Oncologic Medications    Antimicrobial/Immunologic Medications    Endocrine/Metabolic Medications    Topical/Other Medications    --------------------------------------------------------------------------------------    VITAL SIGNS, INS/OUTS (last 24 hours):  --------------------------------------------------------------------------------------  ICU Vital Signs Last 24 Hrs  T(C): 37 (13 Aug 2022 23:41), Max: 37.6 (13 Aug 2022 19:06)  T(F): 98.6 (13 Aug 2022 23:41), Max: 99.68 (13 Aug 2022 19:06)  HR: 99 (13 Aug 2022 23:41) (64 - 121)  BP: 139/101 (13 Aug 2022 23:41) (114/79 - 170/100)  BP(mean): 116 (13 Aug 2022 23:41) (116 - 116)  ABP: --  ABP(mean): --  RR: 26 (13 Aug 2022 23:41) (17 - 26)  SpO2: 97% (13 Aug 2022 23:41) (94% - 100%)    O2 Parameters below as of 13 Aug 2022 23:41  Patient On (Oxygen Delivery Method): room air          I&O's Summary    13 Aug 2022 07:01  -  13 Aug 2022 23:53  --------------------------------------------------------  IN: 2025 mL / OUT: 700 mL / NET: 1325 mL      --------------------------------------------------------------------------------------    EXAM:  General/Neuro  Exam: Normal, NAD, alert, oriented x 3, no focal deficits. PERRLA     Respiratory  Exam: Lungs clear to auscultation, Normal expansion/effort.   [] Tracheostomy   [] Intubated  Mechanical Ventilation:     Cardiovascular  Exam: S1, S2.  Regular rate and rhythm.  Peripheral edema    Cardiac Rhythm: Normal Sinus Rhythm    GI  Exam: Abdomen soft, Non-tender, Non-distended.  Gastrostomy / Jejunostomy tube in place.  Nasogastric tube in place.  Colostomy / Ileostomy.  ***  Wound:   ***      Tubes/Lines/Drains  ***  [x] Peripheral IV  [] Central Venous Line     	[] R	[] L	[] IJ	[] Fem	[] SC        Type:	    Date Placed:   [] Arterial Line		[] R	[] L	[] Fem	[] Rad	[] Ax	Date Placed:   [] PICC:         	[] Midline		[] Mediport           [] Urinary Catheter		Date Placed:     Extremities  Exam: Extremities warm, pink, well-perfused.      Derm:  Exam: Good skin turgor, no skin breakdown.      :   Exam: Herzog catheter in place.     LABS  --------------------------------------------------------------------------------------                        8.9    20.99 )-----------( 107      ( 13 Aug 2022 21:47 )             26.8   08-13    134<L>  |  104  |  15  ----------------------------<  93  4.4   |  18<L>  |  1.10    Ca    8.6      13 Aug 2022 21:47    TPro  5.3<L>  /  Alb  3.3  /  TBili  0.3  /  DBili  x   /  AST  26  /  ALT  14  /  AlkPhos  88  08-13  Urinalysis Basic - ( 13 Aug 2022 14:26 )    Color: Light Orange / Appearance: Slightly Turbid / S.028 / pH: x  Gluc: x / Ketone: Negative  / Bili: Negative / Urobili: Negative   Blood: x / Protein: >600 / Nitrite: Negative   Leuk Esterase: Negative / RBC: 303 /hpf / WBC 77 /HPF   Sq Epi: x / Non Sq Epi: 10 /hpf / Bacteria: Negative    PT/INR - ( 13 Aug 2022 21:47 )   PT: 13.1 sec;   INR: 1.13 ratio         PTT - ( 13 Aug 2022 21:47 )  PTT:27.6 sec  --------------------------------------------------------------------------------------

## 2022-08-13 NOTE — OB PROVIDER H&P - NSHPPHYSICALEXAM_GEN_ALL_CORE
Objective  – Vital Signs  Vital Signs Last 24 Hrs  T(C): 36.8 (13 Aug 2022 11:03), Max: 36.8 (13 Aug 2022 11:03)  T(F): 98.24 (13 Aug 2022 11:03), Max: 98.24 (13 Aug 2022 11:03)  HR: 91 (13 Aug 2022 11:39) (80 - 107)  SpO2: 100% (13 Aug 2022 11:39) (99% - 100%)    – PE:   CV: RRR  Pulm: breathing comfortably on RA  Abd: gravid, nontender  Extr: moving all extremities with ease  – VE: //  – Pyote: irregular  – EFW: 1900g by sono  – Sono: vertex, no fetal cardiac activity visualized, grossly low ANGEL

## 2022-08-13 NOTE — OB PROVIDER LABOR PROGRESS NOTE - ASSESSMENT
A/P: Labor induction for IUFD, preeclampsia with severe features by creatinine of >1.1  Membranes palpated and amnio hook used with bloody discharge but no fluid and IUPC placed  -ctx adequate  -repeat labs pending  Olga Latif MD
Case D/W Anesthesia Dr. Mayo of anesthesia. He is aware of patient's decline in platelets and preeclampsia with severe features.  Platelets are 116,000  awaiting other labs  Olga Latif MD
LABAS: elevated WBC 21 with bands 1.7  fibrinogen 299, PTT 26.4, PT 12.9  Pr/Cr ratio .8  Creatinine 1.16    Case D/W M fellow and [facility] officer and charge nurse  -Preeclampsia-will add Magnesium 4 gram bolus and then 1 gram/hr  -blood cultures  -Monitor UO-oliguria currently  -pitocin induction  APLS labs ordered, repeat labs now.  Olga Latif MD    
27yo  w/ IUFD @30+6 augustin with cerclage in situ. Cerclage removed at bedside without difficulty. Patient dilated 2cm.     Plan  - Will start pitocin 4x4  - BPs elevated during cerclage removal, continue to monitor closely  - HELLP labs sent   - Defer Mg at this time  - GBS swab collected    d/w and evaluated w/ Dr. Bhavya Dean PGY3
BP are increasing to 150's/100  Will start procardia XL 30 mg, MgSO4  Anesthesia is at bedside getting repeat labs  UO was 50 cc in the last hour  Awaiting repeat labs  Olga Latif MD

## 2022-08-13 NOTE — OB PROVIDER DELIVERY SUMMARY - NSPROVIDERDELIVERYNOTE_OBGYN_ALL_OB_FT
Spontaneous vaginal delivery of demised male Spontaneous vaginal delivery of demised male. Head, shoulders and body delivered easily. Cord clamped and cut. Placenta delivered spontaneously and intact. No lacerations in perineum, sulci or cervix. Minimal blood clots noted expressed and fundus firm. Hemabate, cytotec and transexamic acid given.      Spontaneous vaginal delivery of demised male. Head, shoulders and body delivered easily. Cord clamped and cut. Placenta delivered spontaneously and intact with some dark clots. No lacerations in perineum, sulci or cervix. Minimal blood clots noted expressed and fundus firm. Hemabate, cytotec and transexamic acid given.       Placenta cultured at the maternal and fetal surfaces  Placenta sent to pathology  Anesthesia notified patient delivered to draw repeat labs  Olga Latif MD

## 2022-08-13 NOTE — PERINATAL/NEONATAL BEREAVEMENT NOTE - PLANNED PREG
Spoke with patient via telephone with assist from [employer] (Carine, [id number]). Patient agreed to receive diabetes education via telephone. Education handouts provided in Tristanian. We reviewed handouts together while on the phone.    Patient stated he watched all of the videos on the [place] diabetes channel including the video of insulin injection with vial and syringe.    Instructed in the following topics: BG & A1c targets, significance of patient A1c=14.8, importance of improved BG control to prevent complications. Diabetes self management skills including hypoglycemia symptoms treatment and prevention, hyperglycemia symptoms treatment and prevention, when to call the doctor. BG meter given, copy of user manual provided in Tristanian. Patient stated he will review the instructions himself stating he has used BG meters before.    Insulin injection with vial and syringe education handout reviewed- step by step instructions. Noted current insulin prescribed is NPH which requires rolling the vial between the hands to gently mix before drawing up the injection. Site selection and rotation, care and storage of insulin, disposal of syringes. Insulin safety, importance of dose accuracy.    BG meter given with extra test strips. Information provided on discount meters. Patient plans to follow up at [facility]. [facility] brochure provided with contact information.    Reported all to Larissa EMERSON. Will f/u with patient tomorrow for questions.   yes

## 2022-08-13 NOTE — CHART NOTE - NSCHARTNOTEFT_GEN_A_CORE
Patient noted to have multiple lab abnormalities concerning for sPEC:                           11.6   21.56 )-----------( 157      ( 13 Aug 2022 12:13 )             36.1     PT/INR - ( 13 Aug 2022 13:52 )   PT: 12.9 sec;   INR: 1.11 ratio         PTT - ( 13 Aug 2022 13:52 )  PTT:26.4 sec    Fibrinogen: 299    08-13    141  |  105  |  11  ----------------------------<  95  3.9   |  19<L>  |  1.16    Ca    9.6      13 Aug 2022 14:22    TPro  6.9  /  Alb  4.0  /  TBili  0.9  /  DBili  x   /  AST  31  /  ALT  18  /  AlkPhos  115  08-13      Plan  - Patient now meets criteria for sPEC by elevated Cr w/ mild range elevated BPs  - Fibrinogen also low (299)  - Will repeat HELLP labs now  - Start Mg for seizure ppx  - Continue IOL w/ pitocin  - WBC elevated to 21.5, will send BCx/UCx  - APLS labs sent     d/w Dr. Bhavya Dean PGY3

## 2022-08-13 NOTE — CHART NOTE - NSCHARTNOTEFT_GEN_A_CORE
Present at bedside multiple times throughout admission    Patient presented with abdominal pain, diagnosed with fetal demise. Cerclage was removed, patient with severe range blood pressures, initially thought to be due to discomfort with cerclage removal. Persistent mild range blood pressures with lab abnormalities, abruption-pattern contractions q1 min, suboptimal urine output, suspicion for preeclampsia with severe features with placental abruption and IUFD. Blood pressures mild range 130s-140s/70s-90s.    Labor augmented with pitocin, epidural in situ for pain control.  Lab values significant for thrombocytopenia with downtrending platelets, low fibrinogen also downtrending. Azotemia with increasing creatinine during stay.   Given Magnesium for seizure prophylaxis, 4 g load followed by 1 g/hr standing.     Patient delivered nonviable fetus by Dr. Huerta, spontaneous expulsion of placenta. Intact perineum. Prophylactic rectal misoprostol and intramuscular Hemabate given as well as IV oxytocin.    SICU and anesthesia teams aware of patient. Difficult IV access. Anesthesia to assist in placement of second line. Will draw repeat set up of labs. If labs continue to worsen, recommend ICU consultation for postpartum recovery.     Plan of care discussed with Dr. Magana.   Lianna Duckworth MD  OB /MFM Fellow

## 2022-08-13 NOTE — OB RN PATIENT PROFILE - SPIRITUAL CULTURAL, RELIGIOUS PRACTICES/VALUES, PROFILE
[address]  MR#: [id number]  : 1953  ACCOUNT #: [de-identified]   DATE OF SERVICE: 2017    STUDY: Lexiscan Cardiolite myocardial perfusion study. ORDERING PHYSICIAN:  Gillian Holstein, MD.     PRIMARY CARE PHYSICIAN:  Shay Dalton MD.     REASON FOR STUDY:  Dyspnea on exertion, rule out anginal equivalent. ELECTROCARDIOGRAM RESTING: Marked sinus bradycardia, rate 49 within normal limits. PROCEDURE: The patient received 10.7 mCi of technetium-99m sestamibi intravenously via a left arm IV and had a resting myocardial perfusion study completed. She subsequently received Lexiscan 0.4 mg intravenously followed by 5 mL saline flush and was asked to walk at 0.8 miles an hour at 0 grade for 3 minutes. She complained of shortness of breath and some nausea with Lexiscan administration, but had no significant electrocardiographic changes or chest pain. She subsequently received 33 mCi of technetium-99m sestamibi intravenously via the left arm IV and had a stress myocardial perfusion study completed and compared to the resting study. FINDINGS:  There appeared to be normal perfusion throughout the left ventricular myocardium without signs of ischemia or infarction. Gated SPECT imaging demonstrated normal left ventricular volumes, wall motion and function with ejection fraction 62%. There was a transient ischemic dilatation index estimated at 1.1, which is somewhat elevated, but still within normal limits for a pharmacologic myocardial perfusion study, but this appeared to be spuriously high after looking at the resting study. SUMMARY:    1. Normal and low risk Lexiscan Cardiolite myocardial perfusion study. 2.  Normal perfusion throughout the left ventricular myocardium without signs of ischemia or infarction.   3.  Mild increase in left ventricular size with normal wall motion and normal overall left ventricular function, ejection fraction of 62%. 4.  In comparison to the report of the study of 08/02/2016, there really did not appear to be any significant change with normal perfusion at that time and an ejection fraction of 64%.       DO JULIETA Moyer / FLORIAN  D: 12/21/2017 17:49     T: 12/22/2017 08:24  JOB #: [id number]

## 2022-08-13 NOTE — CHART NOTE - NSCHARTNOTEFT_GEN_A_CORE
R4 Note    Patient re-evaluated and seen at bedside following return of repeat HELLP labs concerning for worsening sPEC and coagulopathy.  On 6 hour repeat HELLP labs Cr uptrended to 1.26, Potassium 6, and Fibrinogen 242.  On arrival to bedside patient overall feeling well and asymptomatic.  SVE performed by Dr. Duckworth 8/100/+2 with small amount of remaining cervix on the right.  Patient placed on her right side.  Given worsening preeclampsia, concern for placental abruption, and coagulopathy goal at this time to expedite delivery as soon as possible for the sake of maternal wellness.  We discussed with the patient at bedside the concerns for worsening labs, she expressed understanding and is aware of need for expedited delivery, increased risk of hemorrhage w/ possible need for blood transfusion, and ICU care following delivery.    Plans as follows:  -second large bore IV placed and repeat CMP drawn to check if potassium hemolyzed  -patient placed on telemetry due to hyperkalemia  -H-kit available in the room for time of delivery  -PRBC on hold  -SICU made aware of patient and likely need for ICU care following delivery for coagulopathic state and telemetry  -Anesthesia informed of imminent delivery, deferred placement of A-line at this time, however available if needed following delivery.  Epidural to stay in place due to dropping platelet count with removal pending repeat labs  -Dr. Latif at bedside completing fetal demise paperwork and disposition prior to delivery    Patient and family aware of plan and in agreement.    Pt seen w/ Dr. Donita Mukherjee PGY4 R4 Note    Patient re-evaluated and seen at bedside following return of repeat HELLP labs concerning for worsening sPEC and coagulopathy.  On 6 hour repeat HELLP labs Cr uptrended to 1.26, Potassium 6, and Fibrinogen 242.  On arrival to bedside patient overall feeling well and asymptomatic.  SVE performed by Dr. Duckworth 8/100/+2 with small amount of remaining cervix on the right.  Patient placed on her right side.  Given worsening preeclampsia, concern for placental abruption, and coagulopathy goal at this time to expedite delivery as soon as possible for the sake of maternal wellness.  We discussed with the patient at bedside the concerns for worsening labs, she expressed understanding and is aware of need for expedited delivery, increased risk of hemorrhage w/ possible need for blood transfusion, and ICU care following delivery.    Plans as follows:  -second large bore IV placed and repeat CMP drawn to check if potassium hemolyzed  -patient placed on telemetry due to hyperkalemia  -H-kit available in the room for time of delivery  -PRBC, FFP, cryo on hold  -SICU made aware of patient and likely need for ICU care following delivery for coagulopathic state and telemetry  -Anesthesia informed of imminent delivery, deferred placement of A-line at this time, however available if needed following delivery.  Epidural to stay in place due to dropping platelet count with removal pending repeat labs  -Dr. Latif at bedside completing fetal demise paperwork and disposition prior to delivery  -Dr. Magana, [facility] attending, aware of critical patient    Patient and family aware of plan and in agreement.    Pt seen w/ Dr. Donita Mukherjee PGY4

## 2022-08-13 NOTE — OB PROVIDER DELIVERY SUMMARY - NSSELHIDDEN_OBGYN_ALL_OB_FT
[NS_DeliveryAttending1_OBGYN_ALL_OB_FT:[id number]],[NS_DeliveryAssist1_OBGYN_ALL_OB_FT:[id number]]

## 2022-08-13 NOTE — OB RN DELIVERY SUMMARY - NSSELHIDDEN_OBGYN_ALL_OB_FT
[NS_DeliveryAttending1_OBGYN_ALL_OB_FT:[id number]],[NS_DeliveryRN_OBGYN_ALL_OB_FT:[id number]]

## 2022-08-14 LAB
ALBUMIN SERPL ELPH-MCNC: 3.1 G/DL — LOW (ref 3.3–5)
ALBUMIN SERPL ELPH-MCNC: 3.3 G/DL — SIGNIFICANT CHANGE UP (ref 3.3–5)
ALBUMIN SERPL ELPH-MCNC: 3.3 G/DL — SIGNIFICANT CHANGE UP (ref 3.3–5)
ALP SERPL-CCNC: 80 U/L — SIGNIFICANT CHANGE UP (ref 40–120)
ALP SERPL-CCNC: 83 U/L — SIGNIFICANT CHANGE UP (ref 40–120)
ALP SERPL-CCNC: 86 U/L — SIGNIFICANT CHANGE UP (ref 40–120)
ALT FLD-CCNC: 14 U/L — SIGNIFICANT CHANGE UP (ref 10–45)
ALT FLD-CCNC: 14 U/L — SIGNIFICANT CHANGE UP (ref 10–45)
ALT FLD-CCNC: 15 U/L — SIGNIFICANT CHANGE UP (ref 10–45)
ANION GAP SERPL CALC-SCNC: 10 MMOL/L — SIGNIFICANT CHANGE UP (ref 5–17)
ANION GAP SERPL CALC-SCNC: 11 MMOL/L — SIGNIFICANT CHANGE UP (ref 5–17)
ANION GAP SERPL CALC-SCNC: 11 MMOL/L — SIGNIFICANT CHANGE UP (ref 5–17)
APTT BLD: 22.8 SEC — LOW (ref 27.5–35.5)
APTT BLD: 25.3 SEC — LOW (ref 27.5–35.5)
AST SERPL-CCNC: 20 U/L — SIGNIFICANT CHANGE UP (ref 10–40)
AST SERPL-CCNC: 21 U/L — SIGNIFICANT CHANGE UP (ref 10–40)
AST SERPL-CCNC: 26 U/L — SIGNIFICANT CHANGE UP (ref 10–40)
BASE EXCESS BLDV CALC-SCNC: -3.5 MMOL/L — LOW (ref -2–2)
BILIRUB DIRECT SERPL-MCNC: <0.1 MG/DL — SIGNIFICANT CHANGE UP (ref 0–0.3)
BILIRUB DIRECT SERPL-MCNC: <0.1 MG/DL — SIGNIFICANT CHANGE UP (ref 0–0.3)
BILIRUB INDIRECT FLD-MCNC: >0.1 MG/DL — LOW (ref 0.2–1)
BILIRUB INDIRECT FLD-MCNC: >0.2 MG/DL — SIGNIFICANT CHANGE UP (ref 0.2–1)
BILIRUB SERPL-MCNC: 0.2 MG/DL — SIGNIFICANT CHANGE UP (ref 0.2–1.2)
BILIRUB SERPL-MCNC: 0.3 MG/DL — SIGNIFICANT CHANGE UP (ref 0.2–1.2)
BILIRUB SERPL-MCNC: 0.3 MG/DL — SIGNIFICANT CHANGE UP (ref 0.2–1.2)
BUN SERPL-MCNC: 10 MG/DL — SIGNIFICANT CHANGE UP (ref 7–23)
BUN SERPL-MCNC: 14 MG/DL — SIGNIFICANT CHANGE UP (ref 7–23)
BUN SERPL-MCNC: 7 MG/DL — SIGNIFICANT CHANGE UP (ref 7–23)
CA-I SERPL-SCNC: 1.24 MMOL/L — SIGNIFICANT CHANGE UP (ref 1.15–1.33)
CALCIUM SERPL-MCNC: 7.7 MG/DL — LOW (ref 8.4–10.5)
CALCIUM SERPL-MCNC: 8.2 MG/DL — LOW (ref 8.4–10.5)
CALCIUM SERPL-MCNC: 8.6 MG/DL — SIGNIFICANT CHANGE UP (ref 8.4–10.5)
CHLORIDE BLDV-SCNC: 104 MMOL/L — SIGNIFICANT CHANGE UP (ref 96–108)
CHLORIDE SERPL-SCNC: 103 MMOL/L — SIGNIFICANT CHANGE UP (ref 96–108)
CHLORIDE SERPL-SCNC: 103 MMOL/L — SIGNIFICANT CHANGE UP (ref 96–108)
CHLORIDE SERPL-SCNC: 104 MMOL/L — SIGNIFICANT CHANGE UP (ref 96–108)
CMV IGG FLD QL: 4.1 U/ML — HIGH
CMV IGG SERPL-IMP: POSITIVE
CO2 BLDV-SCNC: 22 MMOL/L — SIGNIFICANT CHANGE UP (ref 22–26)
CO2 SERPL-SCNC: 20 MMOL/L — LOW (ref 22–31)
CO2 SERPL-SCNC: 21 MMOL/L — LOW (ref 22–31)
CO2 SERPL-SCNC: 24 MMOL/L — SIGNIFICANT CHANGE UP (ref 22–31)
CREAT SERPL-MCNC: 0.76 MG/DL — SIGNIFICANT CHANGE UP (ref 0.5–1.3)
CREAT SERPL-MCNC: 0.85 MG/DL — SIGNIFICANT CHANGE UP (ref 0.5–1.3)
CREAT SERPL-MCNC: 1.01 MG/DL — SIGNIFICANT CHANGE UP (ref 0.5–1.3)
CULTURE RESULTS: NO GROWTH — SIGNIFICANT CHANGE UP
DRVVT SCREEN TO CONFIRM RATIO: ABNORMAL
EGFR: 109 ML/MIN/1.73M2 — SIGNIFICANT CHANGE UP
EGFR: 78 ML/MIN/1.73M2 — SIGNIFICANT CHANGE UP
EGFR: 96 ML/MIN/1.73M2 — SIGNIFICANT CHANGE UP
FIBRINOGEN PPP-MCNC: 243 MG/DL — LOW (ref 330–520)
FIBRINOGEN PPP-MCNC: 345 MG/DL — SIGNIFICANT CHANGE UP (ref 330–520)
GAS PNL BLDV: 131 MMOL/L — LOW (ref 136–145)
GAS PNL BLDV: SIGNIFICANT CHANGE UP
GLUCOSE BLDV-MCNC: 77 MG/DL — SIGNIFICANT CHANGE UP (ref 70–99)
GLUCOSE SERPL-MCNC: 81 MG/DL — SIGNIFICANT CHANGE UP (ref 70–99)
GLUCOSE SERPL-MCNC: 89 MG/DL — SIGNIFICANT CHANGE UP (ref 70–99)
GLUCOSE SERPL-MCNC: 98 MG/DL — SIGNIFICANT CHANGE UP (ref 70–99)
HCO3 BLDV-SCNC: 21 MMOL/L — LOW (ref 22–29)
HCT VFR BLD CALC: 24.6 % — LOW (ref 34.5–45)
HCT VFR BLD CALC: 25.1 % — LOW (ref 34.5–45)
HCT VFR BLD CALC: 26.8 % — LOW (ref 34.5–45)
HCT VFR BLDA CALC: 27 % — LOW (ref 34.5–46.5)
HEPARINASE TEG R TIME: 7.3 MIN — SIGNIFICANT CHANGE UP (ref 4.3–8.3)
HGB BLD CALC-MCNC: 9 G/DL — LOW (ref 11.7–16.1)
HGB BLD-MCNC: 8 G/DL — LOW (ref 11.5–15.5)
HGB BLD-MCNC: 8.4 G/DL — LOW (ref 11.5–15.5)
HGB BLD-MCNC: 8.9 G/DL — LOW (ref 11.5–15.5)
HSV1 IGG SER-ACNC: 35.7 INDEX — HIGH
HSV1 IGG SERPL QL IA: POSITIVE
HSV2 IGG FLD-ACNC: 0.18 INDEX — SIGNIFICANT CHANGE UP
HSV2 IGG SERPL QL IA: NEGATIVE — SIGNIFICANT CHANGE UP
INR BLD: 0.97 RATIO — SIGNIFICANT CHANGE UP (ref 0.88–1.16)
INR BLD: 1.02 RATIO — SIGNIFICANT CHANGE UP (ref 0.88–1.16)
LA NT DPL PPP QL: 51.4 SEC — SIGNIFICANT CHANGE UP
LACTATE BLDV-MCNC: 1.5 MMOL/L — SIGNIFICANT CHANGE UP (ref 0.7–2)
MAGNESIUM SERPL-MCNC: 3.8 MG/DL — HIGH (ref 1.6–2.6)
MAGNESIUM SERPL-MCNC: 4.9 MG/DL — HIGH (ref 1.6–2.6)
MAGNESIUM SERPL-MCNC: 5.4 MG/DL — HIGH (ref 1.6–2.6)
MCHC RBC-ENTMCNC: 27.8 PG — SIGNIFICANT CHANGE UP (ref 27–34)
MCHC RBC-ENTMCNC: 27.9 PG — SIGNIFICANT CHANGE UP (ref 27–34)
MCHC RBC-ENTMCNC: 28.3 PG — SIGNIFICANT CHANGE UP (ref 27–34)
MCHC RBC-ENTMCNC: 32.5 GM/DL — SIGNIFICANT CHANGE UP (ref 32–36)
MCHC RBC-ENTMCNC: 33.2 GM/DL — SIGNIFICANT CHANGE UP (ref 32–36)
MCHC RBC-ENTMCNC: 33.5 GM/DL — SIGNIFICANT CHANGE UP (ref 32–36)
MCV RBC AUTO: 83.4 FL — SIGNIFICANT CHANGE UP (ref 80–100)
MCV RBC AUTO: 85.1 FL — SIGNIFICANT CHANGE UP (ref 80–100)
MCV RBC AUTO: 85.4 FL — SIGNIFICANT CHANGE UP (ref 80–100)
NORMALIZED SCT PPP-RTO: 0.84 RATIO — SIGNIFICANT CHANGE UP (ref 0–1.16)
NORMALIZED SCT PPP-RTO: SIGNIFICANT CHANGE UP
NRBC # BLD: 0 /100 WBCS — SIGNIFICANT CHANGE UP (ref 0–0)
PCO2 BLDV: 37 MMHG — LOW (ref 39–42)
PH BLDV: 7.37 — SIGNIFICANT CHANGE UP (ref 7.32–7.43)
PHOSPHATE SERPL-MCNC: 4.6 MG/DL — HIGH (ref 2.5–4.5)
PLATELET # BLD AUTO: 110 K/UL — LOW (ref 150–400)
PLATELET # BLD AUTO: 87 K/UL — LOW (ref 150–400)
PLATELET # BLD AUTO: 98 K/UL — LOW (ref 150–400)
PO2 BLDV: 43 MMHG — SIGNIFICANT CHANGE UP (ref 25–45)
POTASSIUM BLDV-SCNC: 4.3 MMOL/L — SIGNIFICANT CHANGE UP (ref 3.5–5.1)
POTASSIUM SERPL-MCNC: 4 MMOL/L — SIGNIFICANT CHANGE UP (ref 3.5–5.3)
POTASSIUM SERPL-MCNC: 4.1 MMOL/L — SIGNIFICANT CHANGE UP (ref 3.5–5.3)
POTASSIUM SERPL-MCNC: 4.4 MMOL/L — SIGNIFICANT CHANGE UP (ref 3.5–5.3)
POTASSIUM SERPL-SCNC: 4 MMOL/L — SIGNIFICANT CHANGE UP (ref 3.5–5.3)
POTASSIUM SERPL-SCNC: 4.1 MMOL/L — SIGNIFICANT CHANGE UP (ref 3.5–5.3)
POTASSIUM SERPL-SCNC: 4.4 MMOL/L — SIGNIFICANT CHANGE UP (ref 3.5–5.3)
PROT ?TM UR-MCNC: 392 MG/DL — HIGH (ref 0–12)
PROT SERPL-MCNC: 5.5 G/DL — LOW (ref 6–8.3)
PROT SERPL-MCNC: 5.7 G/DL — LOW (ref 6–8.3)
PROT SERPL-MCNC: 5.9 G/DL — LOW (ref 6–8.3)
PROTHROM AB SERPL-ACNC: 11.2 SEC — SIGNIFICANT CHANGE UP (ref 10.5–13.4)
PROTHROM AB SERPL-ACNC: 11.8 SEC — SIGNIFICANT CHANGE UP (ref 10.5–13.4)
RAPIDTEG MAXIMUM AMPLITUDE: 41.6 MM (ref 52–70)
RBC # BLD: 2.88 M/UL — LOW (ref 3.8–5.2)
RBC # BLD: 3.01 M/UL — LOW (ref 3.8–5.2)
RBC # BLD: 3.15 M/UL — LOW (ref 3.8–5.2)
RBC # FLD: 15.1 % — HIGH (ref 10.3–14.5)
RBC # FLD: 15.2 % — HIGH (ref 10.3–14.5)
RBC # FLD: 15.3 % — HIGH (ref 10.3–14.5)
RUBV IGG SER-ACNC: 3.6 INDEX — SIGNIFICANT CHANGE UP
RUBV IGG SER-IMP: POSITIVE — SIGNIFICANT CHANGE UP
SAO2 % BLDV: 73.5 % — SIGNIFICANT CHANGE UP (ref 67–88)
SODIUM SERPL-SCNC: 135 MMOL/L — SIGNIFICANT CHANGE UP (ref 135–145)
SODIUM SERPL-SCNC: 135 MMOL/L — SIGNIFICANT CHANGE UP (ref 135–145)
SODIUM SERPL-SCNC: 137 MMOL/L — SIGNIFICANT CHANGE UP (ref 135–145)
SPECIMEN SOURCE: SIGNIFICANT CHANGE UP
T GONDII IGG SER QL: <3 IU/ML — SIGNIFICANT CHANGE UP
T GONDII IGG SER QL: NEGATIVE — SIGNIFICANT CHANGE UP
T PALLIDUM AB TITR SER: NEGATIVE — SIGNIFICANT CHANGE UP
TEG FUNCTIONAL FIBRINOGEN: <4 MM (ref 15–32)
TEG MAXIMUM AMPLITUDE: 46.3 MM (ref 52–69)
TEG REACTION TIME: 7.6 MIN — SIGNIFICANT CHANGE UP (ref 4.6–9.1)
WBC # BLD: 17.28 K/UL — HIGH (ref 3.8–10.5)
WBC # BLD: 18.97 K/UL — HIGH (ref 3.8–10.5)
WBC # BLD: 23.08 K/UL — HIGH (ref 3.8–10.5)
WBC # FLD AUTO: 17.28 K/UL — HIGH (ref 3.8–10.5)
WBC # FLD AUTO: 18.97 K/UL — HIGH (ref 3.8–10.5)
WBC # FLD AUTO: 23.08 K/UL — HIGH (ref 3.8–10.5)

## 2022-08-14 PROCEDURE — 99291 CRITICAL CARE FIRST HOUR: CPT

## 2022-08-14 RX ORDER — CHLORHEXIDINE GLUCONATE 213 G/1000ML
1 SOLUTION TOPICAL
Refills: 0 | Status: DISCONTINUED | OUTPATIENT
Start: 2022-08-13 | End: 2022-08-14

## 2022-08-14 RX ORDER — SODIUM CHLORIDE 9 MG/ML
1000 INJECTION, SOLUTION INTRAVENOUS
Refills: 0 | Status: DISCONTINUED | OUTPATIENT
Start: 2022-08-14 | End: 2022-08-14

## 2022-08-14 RX ORDER — POLYETHYLENE GLYCOL 3350 17 G/17G
17 POWDER, FOR SOLUTION ORAL DAILY
Refills: 0 | Status: DISCONTINUED | OUTPATIENT
Start: 2022-08-14 | End: 2022-08-15

## 2022-08-14 RX ORDER — OXYCODONE HYDROCHLORIDE 5 MG/1
5 TABLET ORAL EVERY 4 HOURS
Refills: 0 | Status: DISCONTINUED | OUTPATIENT
Start: 2022-08-14 | End: 2022-08-15

## 2022-08-14 RX ORDER — NIFEDIPINE 30 MG
30 TABLET, EXTENDED RELEASE 24 HR ORAL DAILY
Refills: 0 | Status: DISCONTINUED | OUTPATIENT
Start: 2022-08-15 | End: 2022-08-15

## 2022-08-14 RX ORDER — HEPARIN SODIUM 5000 [USP'U]/ML
5000 INJECTION INTRAVENOUS; SUBCUTANEOUS EVERY 12 HOURS
Refills: 0 | Status: DISCONTINUED | OUTPATIENT
Start: 2022-08-14 | End: 2022-08-15

## 2022-08-14 RX ORDER — ACETAMINOPHEN 500 MG
975 TABLET ORAL EVERY 6 HOURS
Refills: 0 | Status: DISCONTINUED | OUTPATIENT
Start: 2022-08-13 | End: 2022-08-15

## 2022-08-14 RX ORDER — SENNA PLUS 8.6 MG/1
2 TABLET ORAL AT BEDTIME
Refills: 0 | Status: DISCONTINUED | OUTPATIENT
Start: 2022-08-14 | End: 2022-08-15

## 2022-08-14 RX ORDER — ACETAMINOPHEN 500 MG
975 TABLET ORAL EVERY 6 HOURS
Refills: 0 | Status: CANCELLED | OUTPATIENT
Start: 2022-08-17 | End: 2022-08-15

## 2022-08-14 RX ORDER — MAGNESIUM SULFATE 500 MG/ML
2 VIAL (ML) INJECTION
Qty: 40 | Refills: 0 | Status: DISCONTINUED | OUTPATIENT
Start: 2022-08-14 | End: 2022-08-14

## 2022-08-14 RX ORDER — CHLORHEXIDINE GLUCONATE 213 G/1000ML
1 SOLUTION TOPICAL
Refills: 0 | Status: DISCONTINUED | OUTPATIENT
Start: 2022-08-14 | End: 2022-08-15

## 2022-08-14 RX ORDER — OXYCODONE HYDROCHLORIDE 5 MG/1
10 TABLET ORAL EVERY 4 HOURS
Refills: 0 | Status: DISCONTINUED | OUTPATIENT
Start: 2022-08-14 | End: 2022-08-15

## 2022-08-14 RX ADMIN — Medication 975 MILLIGRAM(S): at 17:39

## 2022-08-14 RX ADMIN — SENNA PLUS 2 TABLET(S): 8.6 TABLET ORAL at 21:06

## 2022-08-14 RX ADMIN — Medication 975 MILLIGRAM(S): at 12:33

## 2022-08-14 RX ADMIN — Medication 50 GM/HR: at 17:39

## 2022-08-14 RX ADMIN — Medication 30 MILLIGRAM(S): at 06:03

## 2022-08-14 RX ADMIN — Medication 50 GM/HR: at 01:05

## 2022-08-14 RX ADMIN — Medication 975 MILLIGRAM(S): at 02:40

## 2022-08-14 RX ADMIN — Medication 975 MILLIGRAM(S): at 06:03

## 2022-08-14 RX ADMIN — HEPARIN SODIUM 5000 UNIT(S): 5000 INJECTION INTRAVENOUS; SUBCUTANEOUS at 17:39

## 2022-08-14 RX ADMIN — CHLORHEXIDINE GLUCONATE 1 APPLICATION(S): 213 SOLUTION TOPICAL at 06:04

## 2022-08-14 RX ADMIN — Medication 975 MILLIGRAM(S): at 02:12

## 2022-08-14 RX ADMIN — SODIUM CHLORIDE 125 MILLILITER(S): 9 INJECTION, SOLUTION INTRAVENOUS at 08:00

## 2022-08-14 NOTE — CONSULT NOTE ADULT - CRITICAL CARE ATTENDING COMMENT
S/p placental abruption, fetal demise at 30.6 wog.     - Delivery in OR, intraop 2U PRBC, 2U FFP, 1 cryo.  - Concern for preeclampsia and DIC.  - Goal MAP >65, remains at risk for hemodynamic instability, continue monitoring.  - Slow vaginal bleed.  - Hgb 8.0(8.9). Platelets 90. LFTs normal. Fibrinogen corrected. Monitor CBC and coagulation labs.  - High UOP, drinking fair amount of water, likely auto regulating.  - Multimodal pain management, including parenteral Dilaudid PRN.  - Continue Mg gtt per protocol.  - Nifedipine for BP control.  - Tolerating PO.

## 2022-08-14 NOTE — CONSULT NOTE ADULT - ASSESSMENT
ASSESSMENT:    25yo  @30+6 presents for chief concern of painful contractions since 3am. Patient found to have fetal demise. Now s/p vaginal delivery of nonviable male fetus. Admitted to SICU for hemodynamic monitoring.       PLAN:    Neuro:  - A&Ox3  - Multimodal pain control w/ Tylenol and Oxycodone  - Magnesium for seizure ppx; given 4gr bolus, 1gr/hr    Resp:  - On RA, maintain SpO2 >92%  - Out of bed to chair, ambulate as tolerated, and incentive spirometry to prevent atelectasis      CV:  - Not on any vasopressor support w/ goal MAP > 65 mmHg    GI:   - NPO  - Protonix for stress ulcer prophylaxis    Renal:  - Monitor I&Os and electrolytes w/ repletions as necessary  - Lactate 1.5  - Creatinine 1.26->1.10->1.01      Heme:  - Monitor CBC and coags  - Lovenox for VTE prophylaxis  - HH stable, Fibrinogen 243  - s/p 2U FFP, 2U pRBC, 1U cryoprecipitate    ID:   - Monitor for clinical evidence of active infection  - Monitor WBC, temperature, and procalcitonin  - WBC 21.5, Bcx, Ucx sent    Endo:   - Monitor glucose ; HgbA1C    ObGyn:  - Magnesium sulfate gtt 40gr, for 24hrs  - Nifedipine XL 30mg, oral, daily    Code Status: Full    Disposition: SICU

## 2022-08-14 NOTE — CONSULT NOTE ADULT - ATTENDING COMMENTS
S/p placental abruption, fetal demise at 30.6 wog.   - Delivery in OR, intraop 2U PRBC, 2U FFP, 1 cryo.  - Concern for preeclampsia and DIC.  - Slow vaginal bleed.  - Hgb 8.0(8.9). Platelets 90. LFTs normal. Fibrinogen corrected. Monitor.  - High UOP, drinking fair amount of water, likely auto regulating.  - Multimodal pain management.  - Continue Mg gtt per ob.  - Nifedipine for BP control. Well controlled.  - Tolerating PO.

## 2022-08-14 NOTE — CONSULT NOTE ADULT - SUBJECTIVE AND OBJECTIVE BOX
SICU Consultation Note  =====================================================  26y Female  HPI:  Admission H&P    Subjective  HPI: 27yo  @30+6 with no significant PMH presents for chief concern of painful contractions since 3am last night. Diagnosed with fetal demise. Cerclage was removed and patient delivered nonviable male fetus. During the labor became hemodynamically unstable with mild range of BP with suspicion for preeclampsia. Lab values show thrombocytopenia, low fibrinogen, azotemia with uptrending creatinine. Given bolus of magnesium followed by 1g/hr standing.     SICU consulted for hemodynamic monitoring and post-labor care. At the time of arrival patient is hemodynamically stable, denies SOB, chest or abdominal pain. Receiving 2U of pRBC, 2U FFP, 1U of cryo.     Surgery Information  OR time: n/a     EBL: 300          IV Fluids:       Blood Products: 2UpRBC  UOP: n/a          PAST MEDICAL & SURGICAL HISTORY:    Home Meds: Home Medications:    Allergies: Allergies    No Known Allergies    Intolerances      Soc:   Advanced Directives: Presumed Full Code     ROS:    REVIEW OF SYSTEMS    [ ] A ten-point review of systems was otherwise negative except as noted.  [ ] Due to altered mental status/intubation, subjective information were not able to be obtained from the patient. History was obtained, to the extent possible, from review of the chart and collateral sources of information.      CURRENT MEDICATIONS:   --------------------------------------------------------------------------------------  Neurologic Medications  acetaminophen     Tablet .. 975 milliGRAM(s) Oral every 6 hours  magnesium sulfate Infusion 2 Gm/Hr IV Continuous <Continuous>  oxyCODONE    IR 5 milliGRAM(s) Oral every 4 hours PRN Moderate Pain (4 - 6)  oxyCODONE    IR 10 milliGRAM(s) Oral every 4 hours PRN Severe Pain (7 - 10)    Respiratory Medications    Cardiovascular Medications  NIFEdipine XL 30 milliGRAM(s) Oral daily    Gastrointestinal Medications    Genitourinary Medications    Hematologic/Oncologic Medications    Antimicrobial/Immunologic Medications    Endocrine/Metabolic Medications    Topical/Other Medications  chlorhexidine 2% Cloths 1 Application(s) Topical <User Schedule>    --------------------------------------------------------------------------------------    VITAL SIGNS, INS/OUTS (last 24 hours):  --------------------------------------------------------------------------------------  ICU Vital Signs Last 24 Hrs  T(C): 37 (13 Aug 2022 23:41), Max: 37.6 (13 Aug 2022 19:06)  T(F): 98.6 (13 Aug 2022 23:41), Max: 99.68 (13 Aug 2022 19:06)  HR: 97 (14 Aug 2022 01:00) (64 - 121)  BP: 133/94 (14 Aug 2022 01:00) (114/79 - 170/100)  BP(mean): 109 (14 Aug 2022 01:00) (109 - 116)  ABP: --  ABP(mean): --  RR: 22 (14 Aug 2022 01:00) (17 - 26)  SpO2: 100% (14 Aug 2022 01:00) (94% - 100%)    O2 Parameters below as of 13 Aug 2022 23:41  Patient On (Oxygen Delivery Method): room air          I&O's Summary    13 Aug 2022 07:01  -  14 Aug 2022 01:13  --------------------------------------------------------  IN:  mL / OUT: 700 mL / NET: 1325 mL      --------------------------------------------------------------------------------------    EXAM:  General/Neuro  Exam: Normal, NAD, alert, oriented x 3, no focal deficits. PERRLA     Respiratory  Exam: Lungs clear to auscultation, Normal expansion/effort.   [] Tracheostomy   [] Intubated  Mechanical Ventilation:     Cardiovascular  Exam: S1, S2.  Regular rate and rhythm.  Peripheral edema    Cardiac Rhythm: Normal Sinus Rhythm    GI  Exam: Abdomen soft, Non-tender, Non-distended.  Gastrostomy / Jejunostomy tube in place.  Nasogastric tube in place.  Colostomy / Ileostomy.  ***  Wound:   ***      Tubes/Lines/Drains  ***  [x] Peripheral IV  [] Central Venous Line     	[] R	[] L	[] IJ	[] Fem	[] SC        Type:	    Date Placed:   [] Arterial Line		[] R	[] L	[] Fem	[] Rad	[] Ax	Date Placed:   [] PICC:         	[] Midline		[] Mediport           [] Urinary Catheter		Date Placed:     Extremities  Exam: Extremities warm, pink, well-perfused.      Derm:  Exam: Good skin turgor, no skin breakdown.      :   Exam: Herzog catheter in place.     LABS  --------------------------------------------------------------------------------------                        8.9    20.99 )-----------( 107      ( 13 Aug 2022 21:47 )             26.8   08-13    134<L>  |  104  |  15  ----------------------------<  93  4.4   |  18<L>  |  1.10    Ca    8.6      13 Aug 2022 21:47    TPro  5.3<L>  /  Alb  3.3  /  TBili  0.3  /  DBili  x   /  AST  26  /  ALT  14  /  AlkPhos  88  08-13  Urinalysis Basic - ( 13 Aug 2022 14:26 )    Color: Light Orange / Appearance: Slightly Turbid / S.028 / pH: x  Gluc: x / Ketone: Negative  / Bili: Negative / Urobili: Negative   Blood: x / Protein: >600 / Nitrite: Negative   Leuk Esterase: Negative / RBC: 303 /hpf / WBC 77 /HPF   Sq Epi: x / Non Sq Epi: 10 /hpf / Bacteria: Negative    PT/INR - ( 13 Aug 2022 21:47 )   PT: 13.1 sec;   INR: 1.13 ratio         PTT - ( 13 Aug 2022 21:47 )  PTT:27.6 sec  --------------------------------------------------------------------------------------

## 2022-08-15 ENCOUNTER — TRANSCRIPTION ENCOUNTER (OUTPATIENT)
Age: 27
End: 2022-08-15

## 2022-08-15 VITALS
OXYGEN SATURATION: 98 % | SYSTOLIC BLOOD PRESSURE: 116 MMHG | DIASTOLIC BLOOD PRESSURE: 78 MMHG | TEMPERATURE: 99 F | HEART RATE: 93 BPM | RESPIRATION RATE: 18 BRPM

## 2022-08-15 LAB
ALBUMIN SERPL ELPH-MCNC: 2.9 G/DL — LOW (ref 3.3–5)
ALBUMIN SERPL ELPH-MCNC: 3 G/DL — LOW (ref 3.3–5)
ALP SERPL-CCNC: 72 U/L — SIGNIFICANT CHANGE UP (ref 40–120)
ALP SERPL-CCNC: 73 U/L — SIGNIFICANT CHANGE UP (ref 40–120)
ALT FLD-CCNC: 11 U/L — SIGNIFICANT CHANGE UP (ref 10–45)
ALT FLD-CCNC: 12 U/L — SIGNIFICANT CHANGE UP (ref 10–45)
ANION GAP SERPL CALC-SCNC: 10 MMOL/L — SIGNIFICANT CHANGE UP (ref 5–17)
ANION GAP SERPL CALC-SCNC: 11 MMOL/L — SIGNIFICANT CHANGE UP (ref 5–17)
APTT BLD: 22.9 SEC — LOW (ref 27.5–35.5)
APTT BLD: 23.4 SEC — LOW (ref 27.5–35.5)
AST SERPL-CCNC: 15 U/L — SIGNIFICANT CHANGE UP (ref 10–40)
AST SERPL-CCNC: 16 U/L — SIGNIFICANT CHANGE UP (ref 10–40)
BASOPHILS # BLD AUTO: 0.03 K/UL — SIGNIFICANT CHANGE UP (ref 0–0.2)
BASOPHILS NFR BLD AUTO: 0.2 % — SIGNIFICANT CHANGE UP (ref 0–2)
BILIRUB DIRECT SERPL-MCNC: <0.1 MG/DL — SIGNIFICANT CHANGE UP (ref 0–0.3)
BILIRUB INDIRECT FLD-MCNC: SIGNIFICANT CHANGE UP MG/DL (ref 0.2–1)
BILIRUB SERPL-MCNC: 0.1 MG/DL — LOW (ref 0.2–1.2)
BILIRUB SERPL-MCNC: <0.1 MG/DL — LOW (ref 0.2–1.2)
BUN SERPL-MCNC: 10 MG/DL — SIGNIFICANT CHANGE UP (ref 7–23)
BUN SERPL-MCNC: 11 MG/DL — SIGNIFICANT CHANGE UP (ref 7–23)
C TRACH RRNA SPEC QL NAA+PROBE: SIGNIFICANT CHANGE UP
CALCIUM SERPL-MCNC: 7.2 MG/DL — LOW (ref 8.4–10.5)
CALCIUM SERPL-MCNC: 7.5 MG/DL — LOW (ref 8.4–10.5)
CHLORIDE SERPL-SCNC: 102 MMOL/L — SIGNIFICANT CHANGE UP (ref 96–108)
CHLORIDE SERPL-SCNC: 103 MMOL/L — SIGNIFICANT CHANGE UP (ref 96–108)
CO2 SERPL-SCNC: 24 MMOL/L — SIGNIFICANT CHANGE UP (ref 22–31)
CO2 SERPL-SCNC: 24 MMOL/L — SIGNIFICANT CHANGE UP (ref 22–31)
CREAT SERPL-MCNC: 0.83 MG/DL — SIGNIFICANT CHANGE UP (ref 0.5–1.3)
CREAT SERPL-MCNC: 0.88 MG/DL — SIGNIFICANT CHANGE UP (ref 0.5–1.3)
EGFR: 92 ML/MIN/1.73M2 — SIGNIFICANT CHANGE UP
EGFR: 98 ML/MIN/1.73M2 — SIGNIFICANT CHANGE UP
EOSINOPHIL # BLD AUTO: 0.18 K/UL — SIGNIFICANT CHANGE UP (ref 0–0.5)
EOSINOPHIL NFR BLD AUTO: 1.3 % — SIGNIFICANT CHANGE UP (ref 0–6)
FIBRINOGEN PPP-MCNC: 626 MG/DL — HIGH (ref 330–520)
GLUCOSE SERPL-MCNC: 138 MG/DL — HIGH (ref 70–99)
GLUCOSE SERPL-MCNC: 86 MG/DL — SIGNIFICANT CHANGE UP (ref 70–99)
GROUP B BETA STREP DNA (PCR): SIGNIFICANT CHANGE UP
GROUP B BETA STREP INTERPRETATION: SIGNIFICANT CHANGE UP
HCT VFR BLD CALC: 23.2 % — LOW (ref 34.5–45)
HCT VFR BLD CALC: 23.5 % — LOW (ref 34.5–45)
HDV AB SER IA-ACNC: NEGATIVE
HGB BLD-MCNC: 7.5 G/DL — LOW (ref 11.5–15.5)
HGB BLD-MCNC: 7.6 G/DL — LOW (ref 11.5–15.5)
IMM GRANULOCYTES NFR BLD AUTO: 1.7 % — HIGH (ref 0–1.5)
INR BLD: 0.91 RATIO — SIGNIFICANT CHANGE UP (ref 0.88–1.16)
INR BLD: 0.91 RATIO — SIGNIFICANT CHANGE UP (ref 0.88–1.16)
LDH SERPL L TO P-CCNC: 284 U/L — HIGH (ref 50–242)
LYMPHOCYTES # BLD AUTO: 15.4 % — SIGNIFICANT CHANGE UP (ref 13–44)
LYMPHOCYTES # BLD AUTO: 2.09 K/UL — SIGNIFICANT CHANGE UP (ref 1–3.3)
MAGNESIUM SERPL-MCNC: 4.9 MG/DL — HIGH (ref 1.6–2.6)
MCHC RBC-ENTMCNC: 27.2 PG — SIGNIFICANT CHANGE UP (ref 27–34)
MCHC RBC-ENTMCNC: 27.6 PG — SIGNIFICANT CHANGE UP (ref 27–34)
MCHC RBC-ENTMCNC: 32.3 GM/DL — SIGNIFICANT CHANGE UP (ref 32–36)
MCHC RBC-ENTMCNC: 32.3 GM/DL — SIGNIFICANT CHANGE UP (ref 32–36)
MCV RBC AUTO: 84.2 FL — SIGNIFICANT CHANGE UP (ref 80–100)
MCV RBC AUTO: 85.3 FL — SIGNIFICANT CHANGE UP (ref 80–100)
MONOCYTES # BLD AUTO: 0.78 K/UL — SIGNIFICANT CHANGE UP (ref 0–0.9)
MONOCYTES NFR BLD AUTO: 5.8 % — SIGNIFICANT CHANGE UP (ref 2–14)
N GONORRHOEA RRNA SPEC QL NAA+PROBE: SIGNIFICANT CHANGE UP
NEUTROPHILS # BLD AUTO: 10.23 K/UL — HIGH (ref 1.8–7.4)
NEUTROPHILS NFR BLD AUTO: 75.6 % — SIGNIFICANT CHANGE UP (ref 43–77)
NRBC # BLD: 0 /100 WBCS — SIGNIFICANT CHANGE UP (ref 0–0)
NRBC # BLD: 0 /100 WBCS — SIGNIFICANT CHANGE UP (ref 0–0)
PLATELET # BLD AUTO: 122 K/UL — LOW (ref 150–400)
PLATELET # BLD AUTO: 128 K/UL — LOW (ref 150–400)
POTASSIUM SERPL-MCNC: 3.8 MMOL/L — SIGNIFICANT CHANGE UP (ref 3.5–5.3)
POTASSIUM SERPL-MCNC: 3.9 MMOL/L — SIGNIFICANT CHANGE UP (ref 3.5–5.3)
POTASSIUM SERPL-SCNC: 3.8 MMOL/L — SIGNIFICANT CHANGE UP (ref 3.5–5.3)
POTASSIUM SERPL-SCNC: 3.9 MMOL/L — SIGNIFICANT CHANGE UP (ref 3.5–5.3)
PROT SERPL-MCNC: 5.4 G/DL — LOW (ref 6–8.3)
PROT SERPL-MCNC: 5.7 G/DL — LOW (ref 6–8.3)
PROTHROM AB SERPL-ACNC: 10.4 SEC — LOW (ref 10.5–13.4)
PROTHROM AB SERPL-ACNC: 10.6 SEC — SIGNIFICANT CHANGE UP (ref 10.5–13.4)
RBC # BLD: 2.72 M/UL — LOW (ref 3.8–5.2)
RBC # BLD: 2.79 M/UL — LOW (ref 3.8–5.2)
RBC # FLD: 15.3 % — HIGH (ref 10.3–14.5)
RBC # FLD: 15.4 % — HIGH (ref 10.3–14.5)
SODIUM SERPL-SCNC: 136 MMOL/L — SIGNIFICANT CHANGE UP (ref 135–145)
SODIUM SERPL-SCNC: 138 MMOL/L — SIGNIFICANT CHANGE UP (ref 135–145)
SOURCE GROUP B STREP: SIGNIFICANT CHANGE UP
URATE SERPL-MCNC: 7.4 MG/DL — HIGH (ref 2.5–7)
WBC # BLD: 13.54 K/UL — HIGH (ref 3.8–10.5)
WBC # BLD: 14.25 K/UL — HIGH (ref 3.8–10.5)
WBC # FLD AUTO: 13.54 K/UL — HIGH (ref 3.8–10.5)
WBC # FLD AUTO: 14.25 K/UL — HIGH (ref 3.8–10.5)

## 2022-08-15 PROCEDURE — U0003: CPT

## 2022-08-15 PROCEDURE — 83615 LACTATE (LD) (LDH) ENZYME: CPT

## 2022-08-15 PROCEDURE — 87653 STREP B DNA AMP PROBE: CPT

## 2022-08-15 PROCEDURE — 86694 HERPES SIMPLEX NES ANTBDY: CPT

## 2022-08-15 PROCEDURE — 84100 ASSAY OF PHOSPHORUS: CPT

## 2022-08-15 PROCEDURE — 86769 SARS-COV-2 COVID-19 ANTIBODY: CPT

## 2022-08-15 PROCEDURE — 87186 SC STD MICRODIL/AGAR DIL: CPT

## 2022-08-15 PROCEDURE — 87075 CULTR BACTERIA EXCEPT BLOOD: CPT

## 2022-08-15 PROCEDURE — 87591 N.GONORRHOEAE DNA AMP PROB: CPT

## 2022-08-15 PROCEDURE — 87491 CHLMYD TRACH DNA AMP PROBE: CPT

## 2022-08-15 PROCEDURE — 83735 ASSAY OF MAGNESIUM: CPT

## 2022-08-15 PROCEDURE — 86778 TOXOPLASMA ANTIBODY IGM: CPT

## 2022-08-15 PROCEDURE — 87040 BLOOD CULTURE FOR BACTERIA: CPT

## 2022-08-15 PROCEDURE — 84295 ASSAY OF SERUM SODIUM: CPT

## 2022-08-15 PROCEDURE — 85396 CLOTTING ASSAY WHOLE BLOOD: CPT

## 2022-08-15 PROCEDURE — 86695 HERPES SIMPLEX TYPE 1 TEST: CPT

## 2022-08-15 PROCEDURE — 85384 FIBRINOGEN ACTIVITY: CPT

## 2022-08-15 PROCEDURE — 88307 TISSUE EXAM BY PATHOLOGIST: CPT

## 2022-08-15 PROCEDURE — 80307 DRUG TEST PRSMV CHEM ANLYZR: CPT

## 2022-08-15 PROCEDURE — 84550 ASSAY OF BLOOD/URIC ACID: CPT

## 2022-08-15 PROCEDURE — 87077 CULTURE AEROBIC IDENTIFY: CPT

## 2022-08-15 PROCEDURE — 83605 ASSAY OF LACTIC ACID: CPT

## 2022-08-15 PROCEDURE — 86901 BLOOD TYPING SEROLOGIC RH(D): CPT

## 2022-08-15 PROCEDURE — 85014 HEMATOCRIT: CPT

## 2022-08-15 PROCEDURE — 82803 BLOOD GASES ANY COMBINATION: CPT

## 2022-08-15 PROCEDURE — 82435 ASSAY OF BLOOD CHLORIDE: CPT

## 2022-08-15 PROCEDURE — 86900 BLOOD TYPING SEROLOGIC ABO: CPT

## 2022-08-15 PROCEDURE — 84156 ASSAY OF PROTEIN URINE: CPT

## 2022-08-15 PROCEDURE — 80076 HEPATIC FUNCTION PANEL: CPT

## 2022-08-15 PROCEDURE — 87150 DNA/RNA AMPLIFIED PROBE: CPT

## 2022-08-15 PROCEDURE — 36415 COLL VENOUS BLD VENIPUNCTURE: CPT

## 2022-08-15 PROCEDURE — 85027 COMPLETE CBC AUTOMATED: CPT

## 2022-08-15 PROCEDURE — 82330 ASSAY OF CALCIUM: CPT

## 2022-08-15 PROCEDURE — 85025 COMPLETE CBC W/AUTO DIFF WBC: CPT

## 2022-08-15 PROCEDURE — 82570 ASSAY OF URINE CREATININE: CPT

## 2022-08-15 PROCEDURE — 80048 BASIC METABOLIC PNL TOTAL CA: CPT

## 2022-08-15 PROCEDURE — 81001 URINALYSIS AUTO W/SCOPE: CPT

## 2022-08-15 PROCEDURE — 86645 CMV ANTIBODY IGM: CPT

## 2022-08-15 PROCEDURE — 59025 FETAL NON-STRESS TEST: CPT

## 2022-08-15 PROCEDURE — 84132 ASSAY OF SERUM POTASSIUM: CPT

## 2022-08-15 PROCEDURE — 86644 CMV ANTIBODY: CPT

## 2022-08-15 PROCEDURE — 86762 RUBELLA ANTIBODY: CPT

## 2022-08-15 PROCEDURE — 85610 PROTHROMBIN TIME: CPT

## 2022-08-15 PROCEDURE — 87086 URINE CULTURE/COLONY COUNT: CPT

## 2022-08-15 PROCEDURE — 87070 CULTURE OTHR SPECIMN AEROBIC: CPT

## 2022-08-15 PROCEDURE — 86777 TOXOPLASMA ANTIBODY: CPT

## 2022-08-15 PROCEDURE — G0463: CPT

## 2022-08-15 PROCEDURE — 80053 COMPREHEN METABOLIC PANEL: CPT

## 2022-08-15 PROCEDURE — 85018 HEMOGLOBIN: CPT

## 2022-08-15 PROCEDURE — 85730 THROMBOPLASTIN TIME PARTIAL: CPT

## 2022-08-15 PROCEDURE — 86696 HERPES SIMPLEX TYPE 2 TEST: CPT

## 2022-08-15 PROCEDURE — 86850 RBC ANTIBODY SCREEN: CPT

## 2022-08-15 PROCEDURE — 87635 SARS-COV-2 COVID-19 AMP PRB: CPT

## 2022-08-15 PROCEDURE — 82947 ASSAY GLUCOSE BLOOD QUANT: CPT

## 2022-08-15 PROCEDURE — 86780 TREPONEMA PALLIDUM: CPT

## 2022-08-15 RX ORDER — ENOXAPARIN SODIUM 100 MG/ML
40 INJECTION SUBCUTANEOUS
Qty: 30 | Refills: 0
Start: 2022-08-15

## 2022-08-15 RX ORDER — POTASSIUM CHLORIDE 20 MEQ
20 PACKET (EA) ORAL ONCE
Refills: 0 | Status: COMPLETED | OUTPATIENT
Start: 2022-08-15 | End: 2022-08-15

## 2022-08-15 RX ORDER — ACETAMINOPHEN 500 MG
3 TABLET ORAL
Qty: 0 | Refills: 0 | DISCHARGE
Start: 2022-08-15

## 2022-08-15 RX ORDER — SENNA PLUS 8.6 MG/1
2 TABLET ORAL
Qty: 0 | Refills: 0 | DISCHARGE
Start: 2022-08-15

## 2022-08-15 RX ORDER — NIFEDIPINE 30 MG
1 TABLET, EXTENDED RELEASE 24 HR ORAL
Qty: 0 | Refills: 0 | DISCHARGE
Start: 2022-08-15

## 2022-08-15 RX ADMIN — Medication 30 MILLIGRAM(S): at 06:29

## 2022-08-15 RX ADMIN — Medication 975 MILLIGRAM(S): at 14:37

## 2022-08-15 RX ADMIN — Medication 975 MILLIGRAM(S): at 06:29

## 2022-08-15 RX ADMIN — HEPARIN SODIUM 5000 UNIT(S): 5000 INJECTION INTRAVENOUS; SUBCUTANEOUS at 06:30

## 2022-08-15 NOTE — DISCHARGE NOTE OB - CARE PROVIDER_API CALL
Olga Latif  OBSTETRICS AND GYNECOLOGY  [address], [address]  [place] [address]  Phone: [phone]  Fax: [phone]  Follow Up Time:

## 2022-08-15 NOTE — PROGRESS NOTE ADULT - ATTENDING COMMENTS
Patient seen at 4 pm on 8/14  She denies HA, scotomata, RUQ or epigastric pain  Lochia is light now  No pain  Bps are normal  Fundus firm and NT  Ext: SCD in place  crandall draining clear yellow urine  Labs reviewed. Fibrinogen is normal as well as Creatinine  A/P: HD 1 s/p IUFD with preeclampsia with severe features  -D/C Magnesium sulfate at 19:30 and d/c crandall then as well  -Procardia XL 30 q day-monitor BP  -Patient has resolved hypofibrinogenemia  -Appreciate SICU care  -Heparin SQ prophylaxis to be started  -ambulate and regular diet  -Transfer to OB  -d/w SICU resident at 1700  Olga Latif MD
Patient seen and examined. doing well overall.  tolerating po, ambulating, pain well controlled. +flatus/BM, crandall just removed, DTV. denies HA, vision changes, CP, SOB, ruq/epigastric pain.       VSS    abd: soft, NT, fundus firm below umbilicus  ext: NT, symmetrical    27yo  presented with IUFD @30w6d PPD# s/p VD () c/b sPEC on Mg and Procardia 30XL w/ concern for DIC. Patient stable at this time. PMH significant for Hep B. sympathies expressed and pt appreciative. would like to go home today if possible.     #sPEC w c/f DIC  - s/p Mg for 24 hours from delivery for seizure prophylaxis (@730pm)  - f/u AM HELLP labs   - Cr: 1.16->1.26->1.1->1->0.85->0.76->0.83  - Strict I&Os  - s/p crandall, DTV  - cont nifedipine 30mg ER daily  - Plt: 157->116->107->87->98->110->122 -> 128 today  - Fibrinogen: 299->242->199->243->345  - H/H stable 7.6/23.5, no s/sx of acute blood loss anemia  - will t/b with MFM to determine if lovenox pp is necessary for ppx.     #Chronic Hep B: hematology outpatient f/u      pt DTV, and will need IgM CMV as IgG positive. reviewed implications. reviewed otherwise neg infectious panel. pt would like discharge today. reviewed given no s/sx of acute blood loss anemia and stable H/h as well as uptrending plts and otherwise stable VS, can consider discharge today following voiding and collection of CMV IgM as well as further MFM recs. all questions answered. reviewed BP parameters for blood pressure meds. pt and partner expressed understanding.

## 2022-08-15 NOTE — DISCHARGE NOTE OB - ADDITIONAL INSTRUCTIONS
please schedule a 1 week blood pressure check  as well as a 6 week postpartum visit. please schedule a 1 week blood pressure check  as well as a 6 week postpartum visit.    Please continue to take your blood pressure medication. You should also check your blood pressure twice a day (morning before your medication and at nighttime). If your blood pressure in the morning is less than 110/60, do not take your medication. Instead repeat your blood pressure in 1 hour and if greater than 110/60, take your medication at that time. You should then continue to take your blood pressure at night as well. Please call the office if your blood pressure is greater than 160/110.

## 2022-08-15 NOTE — PROGRESS NOTE ADULT - SUBJECTIVE AND OBJECTIVE BOX
OB Progress Note: VD PPD#1    S: 27yo PPD#1 s/p VD of IUFD @30w6d. Patient feels well. Pain is well controlled, minimal vaginal bleeding. She is tolerating a regular diet and passing flatus. Herzog in place draining adequate amount of clear yellow urine. Denies CP/SOB. Denies lightheadedness/dizziness. Denies N/V.    O:  Vitals:  Vital Signs Last 24 Hrs  T(C): 36.9 (14 Aug 2022 16:00), Max: 37.6 (13 Aug 2022 19:06)  T(F): 98.4 (14 Aug 2022 16:00), Max: 99.68 (13 Aug 2022 19:06)  HR: 101 (14 Aug 2022 18:00) (84 - 121)  BP: 110/63 (14 Aug 2022 18:00) (110/63 - 159/111)  BP(mean): 80 (14 Aug 2022 18:00) (80 - 116)  RR: 22 (14 Aug 2022 18:00) (17 - 31)  SpO2: 97% (14 Aug 2022 18:00) (97% - 100%)    MEDICATIONS  (STANDING):  acetaminophen     Tablet .. 975 milliGRAM(s) Oral every 6 hours  chlorhexidine 2% Cloths 1 Application(s) Topical <User Schedule>  heparin   Injectable 5000 Unit(s) SubCutaneous every 12 hours  magnesium sulfate Infusion 2 Gm/Hr (50 mL/Hr) IV Continuous <Continuous>  polyethylene glycol 3350 17 Gram(s) Oral daily  senna 2 Tablet(s) Oral at bedtime      Labs:  Blood type: A Positive  Rubella IgG: RPR: Negative                          8.4<L>   17.28<H> >-----------< 110<L>    ( 08-14 @ 13:22 )             25.1<L>                        8.0<L>   18.97<H> >-----------< 98<L>    ( 08-14 @ 06:42 )             24.6<L>                        8.9<L>   23.08<H> >-----------< 87<L>    ( 08-14 @ 00:44 )             26.8<L>                        8.9<L>   20.99<H> >-----------< 107<L>    ( 08-13 @ 21:47 )             26.8<L>                        11.1<L>   21.05<H> >-----------< 116<L>    ( 08-13 @ 18:21 )             34.8                        11.6   21.56<H> >-----------< 157    ( 08-13 @ 12:13 )             36.1    08-14-22 @ 13:22      137  |  103  |  7   ----------------------------<  98  4.1   |  24  |  0.76    08-14-22 @ 06:42      135  |  103  |  10  ----------------------------<  89  4.0   |  21<L>  |  0.85    08-14-22 @ 00:44      135  |  104  |  14  ----------------------------<  81  4.4   |  20<L>  |  1.01    08-13-22 @ 21:47      134<L>  |  104  |  15  ----------------------------<  93  4.4   |  18<L>  |  1.10    08-13-22 @ 18:21      135  |  103  |  14  ----------------------------<  87  6.0<H>   |  20<L>  |  1.26    08-13-22 @ 14:22      141  |  105  |  11  ----------------------------<  95  3.9   |  19<L>  |  1.16        Ca    7.7<L>      14 Aug 2022 13:22  Ca    8.2<L>      14 Aug 2022 06:42  Ca    8.6      14 Aug 2022 00:44  Ca    8.6      13 Aug 2022 21:47  Ca    9.5      13 Aug 2022 18:21  Ca    9.6      13 Aug 2022 14:22  Phos  4.6<H>     08-14  Mg     5.4<H>     08-14  Mg     4.9<H>     08-14  Mg     3.8<H>     08-14    TPro  5.9<L>  /  Alb  3.3  /  TBili  0.2  /  DBili  <0.1  /  AST  20  /  ALT  15  /  AlkPhos  83  08-14-22 @ 13:22  TPro  5.5<L>  /  Alb  3.1<L>  /  TBili  0.3  /  DBili  <0.1  /  AST  21  /  ALT  14  /  AlkPhos  80  08-14-22 @ 06:42  TPro  5.7<L>  /  Alb  3.3  /  TBili  0.3  /  DBili  x   /  AST  26  /  ALT  14  /  AlkPhos  86  08-14-22 @ 00:44  TPro  5.3<L>  /  Alb  3.3  /  TBili  0.3  /  DBili  x   /  AST  26  /  ALT  14  /  AlkPhos  88  08-13-22 @ 21:47  TPro  6.9  /  Alb  4.0  /  TBili  0.6  /  DBili  x   /  AST  34  /  ALT  17  /  AlkPhos  116  08-13-22 @ 18:21  TPro  6.9  /  Alb  4.0  /  TBili  0.9  /  DBili  x   /  AST  31  /  ALT  18  /  AlkPhos  115  08-13-22 @ 14:22    Physical Exam:  General: NAD  CV: RRR  Resp: O2 saturation wnl on RA, nml work of breathing  Abdomen: soft, non-tender, non-distended, fundus firm  Vaginal: Lochia wnl  Extremities: No erythema/edema
PPD#2- ATTENDING NOTE    S: Patient doing well. Minimal lochia. No headaches    O: Vital Signs Last 24 Hrs  T(C): 37 (13 Aug 2022 23:41), Max: 37.6 (13 Aug 2022 19:06)  T(F): 98.6 (13 Aug 2022 23:41), Max: 99.68 (13 Aug 2022 19:06)  HR: 97 (14 Aug 2022 01:00) (64 - 121)  BP: 133/94 (14 Aug 2022 01:00) (114/79 - 170/100)  BP(mean): 109 (14 Aug 2022 01:00) (109 - 116)  RR: 22 (14 Aug 2022 01:00) (17 - 26)  SpO2: 100% (14 Aug 2022 01:00) (94% - 100%)    Parameters below as of 13 Aug 2022 23:41  Patient On (Oxygen Delivery Method): room air        Gen: NAD  Abd: uterus tender but firm and U-2  Lochia: moderate  Ext: SCD in place    Labs:                        8.9    23.08 )-----------( 87       ( 14 Aug 2022 00:44 )             26.8       A: 26y PPD#1 s/p delivery of an IUFD with preeclampsia with severe features and labs concerning for CDIC  Pt transferred to SICU for management/close monitoring    Plan:  -Preeclampsia with severe features-Magnesium Sulfate for 24 hours  Procardia XL 30 mg-BP are improving now after her first dose  Monitor I&O-creatinine is improving and urine output is normalizing    IUFD  Emotional support provided  SW consult    DIC  Repeat labs are pending however patient's lochia is light  f/u fibrinogen and platelets    Appreciate SICU care and support    Office [phone]  Dr. Latif    
Patient is 25yo seen and examined at bedside, no acute overnight events.   No acute concerns, pain well controlled.   Patient is ambulating, voiding spontaneously, passing gas, and tolerating regular diet.  Lochia decreasing.  Denies CP, SOB, leg pain, N/V, HA, blurred vision, epigastric pain.    Vital Signs Last 24 Hours  T(C): 36.9 (08-15-22 @ 02:14), Max: 37 (08-14-22 @ 08:00)  HR: 90 (08-15-22 @ 02:14) (90 - 109)  BP: 121/86 (08-15-22 @ 02:14) (107/59 - 149/85)  RR: 18 (08-15-22 @ 02:14) (17 - 31)  SpO2: 98% (08-15-22 @ 02:14) (97% - 100%)    Physical Exam:  General: NAD  Abdomen: Soft, non-tender, non-distended, fundus firm  Pelvic: Lochia wnl  Ext: WWP, non-tender, symmetric, mild edema     Labs:  Blood type: A Positive  Rubella IgG: RPR: Negative                          7.5<L>   14.25<H> >-----------< 122<L>    ( 08-15 @ 00:41 )             23.2<L>                        8.4<L>   17.28<H> >-----------< 110<L>    ( 08-14 @ 13:22 )             25.1<L>                        8.0<L>   18.97<H> >-----------< 98<L>    ( 08-14 @ 06:42 )             24.6<L>                        8.9<L>   23.08<H> >-----------< 87<L>    ( 08-14 @ 00:44 )             26.8<L>                        8.9<L>   20.99<H> >-----------< 107<L>    ( 08-13 @ 21:47 )             26.8<L>                        11.1<L>   21.05<H> >-----------< 116<L>    ( 08-13 @ 18:21 )             34.8                        11.6   21.56<H> >-----------< 157    ( 08-13 @ 12:13 )             36.1    08-15-22 @ 00:43      138  |  103  |  10  ----------------------------<  138<H>  3.8   |  24  |  0.83    08-14-22 @ 13:22      137  |  103  |  7   ----------------------------<  98  4.1   |  24  |  0.76    08-14-22 @ 06:42      135  |  103  |  10  ----------------------------<  89  4.0   |  21<L>  |  0.85    08-14-22 @ 00:44      135  |  104  |  14  ----------------------------<  81  4.4   |  20<L>  |  1.01    08-13-22 @ 21:47      134<L>  |  104  |  15  ----------------------------<  93  4.4   |  18<L>  |  1.10    08-13-22 @ 18:21      135  |  103  |  14  ----------------------------<  87  6.0<H>   |  20<L>  |  1.26    08-13-22 @ 14:22      141  |  105  |  11  ----------------------------<  95  3.9   |  19<L>  |  1.16        Ca    7.2<L>      15 Aug 2022 00:43  Ca    7.7<L>      14 Aug 2022 13:22  Ca    8.2<L>      14 Aug 2022 06:42  Ca    8.6      14 Aug 2022 00:44  Ca    8.6      13 Aug 2022 21:47  Ca    9.5      13 Aug 2022 18:21  Ca    9.6      13 Aug 2022 14:22  Phos  4.6<H>     08-14  Mg     4.9<H>     08-15  Mg     5.4<H>     08-14  Mg     4.9<H>     08-14  Mg     3.8<H>     08-14    TPro  5.4<L>  /  Alb  2.9<L>  /  TBili  <0.1<L>  /  DBili  <0.1  /  AST  16  /  ALT  11  /  AlkPhos  72  08-15-22 @ 00:43  TPro  5.9<L>  /  Alb  3.3  /  TBili  0.2  /  DBili  <0.1  /  AST  20  /  ALT  15  /  AlkPhos  83  08-14-22 @ 13:22  TPro  5.5<L>  /  Alb  3.1<L>  /  TBili  0.3  /  DBili  <0.1  /  AST  21  /  ALT  14  /  AlkPhos  80  08-14-22 @ 06:42  TPro  5.7<L>  /  Alb  3.3  /  TBili  0.3  /  DBili  x   /  AST  26  /  ALT  14  /  AlkPhos  86  08-14-22 @ 00:44  TPro  5.3<L>  /  Alb  3.3  /  TBili  0.3  /  DBili  x   /  AST  26  /  ALT  14  /  AlkPhos  88  08-13-22 @ 21:47  TPro  6.9  /  Alb  4.0  /  TBili  0.6  /  DBili  x   /  AST  34  /  ALT  17  /  AlkPhos  116  08-13-22 @ 18:21  TPro  6.9  /  Alb  4.0  /  TBili  0.9  /  DBili  x   /  AST  31  /  ALT  18  /  AlkPhos  115  08-13-22 @ 14:22              MEDICATIONS  (STANDING):  acetaminophen     Tablet .. 975 milliGRAM(s) Oral every 6 hours  chlorhexidine 2% Cloths 1 Application(s) Topical <User Schedule>  heparin   Injectable 5000 Unit(s) SubCutaneous every 12 hours  NIFEdipine XL 30 milliGRAM(s) Oral daily  polyethylene glycol 3350 17 Gram(s) Oral daily  senna 2 Tablet(s) Oral at bedtime    MEDICATIONS  (PRN):  oxyCODONE    IR 5 milliGRAM(s) Oral every 4 hours PRN Moderate Pain (4 - 6)  oxyCODONE    IR 10 milliGRAM(s) Oral every 4 hours PRN Severe Pain (7 - 10)    
OB Progress Note:  PPD#1    S: 25yo PPD#1 s/p . Patient feels well. Pain is well controlled. Herzog and SCDs in place. Endorses light vaginal bleeding, soaking less than 1 pad/hour. Denies CP/SOB. Denies lightheadedness/dizziness. Denies N/V.     O:  Vitals:  Vital Signs Last 24 Hrs  T(C): 37 (13 Aug 2022 23:41), Max: 37.6 (13 Aug 2022 19:06)  T(F): 98.6 (13 Aug 2022 23:41), Max: 99.68 (13 Aug 2022 19:06)  HR: 97 (14 Aug 2022 01:00) (64 - 121)  BP: 133/94 (14 Aug 2022 01:00) (114/79 - 170/100)  BP(mean): 109 (14 Aug 2022 01:00) (109 - 116)  RR: 22 (14 Aug 2022 01:00) (17 - 26)  SpO2: 100% (14 Aug 2022 01:00) (94% - 100%)    Parameters below as of 13 Aug 2022 23:41  Patient On (Oxygen Delivery Method): room air      Gen NAD  CV Regular  Pulm comfortable on RA  Abd soft nontender, fundus firm   Ext nontender b/l       MEDICATIONS  (STANDING):  acetaminophen     Tablet .. 975 milliGRAM(s) Oral every 6 hours  chlorhexidine 2% Cloths 1 Application(s) Topical <User Schedule>  magnesium sulfate Infusion 2 Gm/Hr (50 mL/Hr) IV Continuous <Continuous>  NIFEdipine XL 30 milliGRAM(s) Oral daily      Labs:  Blood type: A Positive  Rubella IgG: RPR: Negative                          8.9<L>   23.08<H> >-----------< 87<L>    (  @ 00:44 )             26.8<L>                        8.9<L>   20.99<H> >-----------< 107<L>    (  @ 21:47 )             26.8<L>                        11.1<L>   21.05<H> >-----------< 116<L>    (  @ 18:21 )             34.8                        11.6   21.56<H> >-----------< 157    (  @ 12:13 )             36.1    22 @ 00:44      135  |  104  |  14  ----------------------------<  81  4.4   |  20<L>  |  1.01    22 @ 21:47      134<L>  |  104  |  15  ----------------------------<  93  4.4   |  18<L>  |  1.10    22 @ 18:21      135  |  103  |  14  ----------------------------<  87  6.0<H>   |  20<L>  |  1.26    22 @ 14:22      141  |  105  |  11  ----------------------------<  95  3.9   |  19<L>  |  1.16        Ca    8.6      14 Aug 2022 00:44  Ca    8.6      13 Aug 2022 21:47  Ca    9.5      13 Aug 2022 18:21  Ca    9.6      13 Aug 2022 14:22  Phos  4.6<H>       Mg     3.8<H>         TPro  5.7<L>  /  Alb  3.3  /  TBili  0.3  /  DBili  x   /  AST  26  /  ALT  14  /  AlkPhos  86  22 @ 00:44  TPro  5.3<L>  /  Alb  3.3  /  TBili  0.3  /  DBili  x   /  AST  26  /  ALT  14  /  AlkPhos  88  22 @ 21:47  TPro  6.9  /  Alb  4.0  /  TBili  0.6  /  DBili  x   /  AST  34  /  ALT  17  /  AlkPhos  116  22 @ 18:21  TPro  6.9  /  Alb  4.0  /  TBili  0.9  /  DBili  x   /  AST  31  /  ALT  18  /  AlkPhos  115  22 @ 14:22          Physical Exam:  General: NAD  Heart: all extremities well perfused  Lungs: breathing comfortably  Abdomen: soft, non-tender, non-distended, fundus firm  Vaginal: lochia wnl  Extremities: No calf tenderness or erythema

## 2022-08-15 NOTE — DISCHARGE NOTE OB - NS MD DC FALL RISK RISK
For information on Fall & Injury Prevention, visit: [email or web address] OR  [email or web address] OR  https://www.cdc.gov/steadi/patient.html

## 2022-08-15 NOTE — DISCHARGE NOTE OB - HOSPITAL COURSE
Patient presented for painful contractions at 30w6d. she was subsequent diagnosed with an IUFD with no fetal cardiac activity which was confirmed by multiple providers. She then underwent an IOL for IUFD however, during her induction of labor she began to have lab abnormalities and subsequently developed thrombocytopenia, MILAD, mild range BPs, and was diagnosed with severe PEC. She was started on magnesium for seizure ppx. Her labs were further c/f DIC as fibrinogen was low (rai of 199) and LDH elevated, with acute anemia. She underwent a , and was subsequently transferred to ICU for more acute care. Her labs improved immediately postpartum. She did not receive any blood products of any kind. her labs normalized, she had 24hrs of Mag pp. by HD 3, her blood pressures were well controlled on nifedipine 30mg daily, her anemia was stable with Hg of 7.6 with out any s/sx of acute blood loss anemia, and her labs improved with plts of 128, and Cr normalized.

## 2022-08-15 NOTE — PATIENT PROFILE ADULT - FALL HARM RISK - HARM RISK INTERVENTIONS

## 2022-08-15 NOTE — DISCHARGE NOTE OB - MEDICATION SUMMARY - MEDICATIONS TO STOP TAKING
I will STOP taking the medications listed below when I get home from the hospital:    Prometrium 200 mg oral capsule  -- 200 milligram(s) intravaginally once a day (at bedtime)   -- Do not take this drug if you are pregnant.  May cause drowsiness or dizziness.    metroNIDAZOLE 500 mg oral tablet  -- 1 tab(s) by mouth 2 times a day   -- Do not drink alcoholic beverages when taking this medication.  Finish all this medication unless otherwise directed by prescriber.  May discolor urine or feces.    indomethacin 25 mg oral capsule  -- 1 cap(s) by mouth every 6 hours MDD:4x/day  -- Do not take aspirin or aspirin containing products without knowledge and consent of your physician.  It is very important that you take or use this exactly as directed.  Do not skip doses or discontinue unless directed by your doctor.  May cause drowsiness or dizziness.  Obtain medical advice before taking any non-prescription drugs as some may affect the action of this medication.  Take with food or milk.

## 2022-08-15 NOTE — PROGRESS NOTE ADULT - ASSESSMENT
27yo  presented with IUFD @30w6d s/p  () c/b sPEC on Mg and Procardia 30XL w/ concern for DIC. Patient stable at this time. PMH significant for Hep B.     #DIC  - Appreciate SICU care   - Plt: 157->116->107->87            - Fibrinogen: 299->242->199->243     #sPEC  - Continue Mg for 24 hours from delivery for seizure prophylaxis  - f/u AM HELLP labs   - Cr: 1.16->1.26->1.1  - Strict I&Os    #Maternal wellbeing   #Chronic Hep B   - SW for IUFD   - routine postpartum care  - Tylenol, Motrin prn pain control     Tonie Oneal PGY3
27yo  presented with IUFD @30w6d s/p VD () c/b sPEC on Mg and Procardia 30XL w/ concern for DIC. Patient stable at this time. PMH significant for Hep B.     #DIC  - Appreciate SICU care  - Plt: 157->116->107->87->98->110  - Fibrinogen: 299->242->199->243->345  - DVT ppx: SCDs bilaterally and HSQ    #sPEC  - Continue Mg for 24 hours from delivery for seizure prophylaxis (@730pm)  - f/u AM HELLP labs   - Cr: 1.16->1.26->1.1->1->0.85->0.76  - Strict I&Os  - d/c crandall once Mg is discontinued, f/u void    #Maternal wellbeing   #Chronic Hep B   - Appreciate  consultation and recommendations  - Continue routine postpartum care  - Tylenol, Motrin PRN pain control     d/w and evaluated w/ Dr. Bhavya Dean PGY3
27yo  presented with IUFD @30w6d PPD# s/p VD () c/b sPEC on Mg and Procardia 30XL w/ concern for DIC. Patient stable at this time. PMH significant for Hep B.     #DIC  - Plt: 157->116->107->87->98->110->122  - Fibrinogen: 299->242->199->243->345  - DVT ppx: SCDs bilaterally and HSQ    #sPEC  - s/p Mg for 24 hours from delivery for seizure prophylaxis (@730pm)  - f/u AM HELLP labs   - Cr: 1.16->1.26->1.1->1->0.85->0.76->0.83  - Strict I&Os  - d/c karley     #Maternal wellbeing   #Chronic Hep B   - Appreciate  consultation and recommendations  - Continue routine postpartum care  - Tylenol, Motrin PRN pain control     Dixie Walsh, PGY-3

## 2022-08-15 NOTE — DISCHARGE NOTE OB - MEDICATION SUMMARY - MEDICATIONS TO TAKE
I will START or STAY ON the medications listed below when I get home from the hospital:    acetaminophen 325 mg oral tablet  -- 3 tab(s) by mouth every 6 hours  -- Indication: For pain    NIFEdipine 30 mg oral tablet, extended release  -- 1 tab(s) by mouth once a day  -- Indication: For blood pressure    senna leaf extract oral tablet  -- 2 tab(s) by mouth once a day (at bedtime)  -- Indication: For constipation   I will START or STAY ON the medications listed below when I get home from the hospital:    acetaminophen 325 mg oral tablet  -- 3 tab(s) by mouth every 6 hours  -- Indication: For pain    Lovenox 40 mg/0.4 mL injectable solution  -- 40 milligram(s) subcutaneously once a day   -- It is very important that you take or use this exactly as directed.  Do not skip doses or discontinue unless directed by your doctor.    -- Indication: For blood thinner    NIFEdipine 30 mg oral tablet, extended release  -- 1 tab(s) by mouth once a day  -- Indication: For blood pressure    senna leaf extract oral tablet  -- 2 tab(s) by mouth once a day (at bedtime)  -- Indication: For constipation

## 2022-08-15 NOTE — DISCHARGE NOTE OB - CARE PLAN
1 Principal Discharge DX:	Vaginal delivery  Assessment and plan of treatment:	nothing in the vagina for 6 weeks. no sex, tampons, pools or baths.   Principal Discharge DX:	Vaginal delivery  Assessment and plan of treatment:	nothing in the vagina for 6 weeks. no sex, tampons, pools or baths.  Assessment and plan of treatment:	see MD as instructed

## 2022-08-15 NOTE — DISCHARGE NOTE OB - PATIENT PORTAL LINK FT
You can access the FollowMyHealth Patient Portal offered by [facility] by registering at the following website: [email or web address]. By joining [employer]s FollowMyHealth portal, you will also be able to view your health information using other applications (apps) compatible with our system.

## 2022-08-16 ENCOUNTER — INPATIENT (INPATIENT)
Facility: HOSPITAL | Age: 27
LOS: 2 days | Discharge: HOME | DRG: 776 | End: 2022-08-19
Attending: OBSTETRICS & GYNECOLOGY | Admitting: OBSTETRICS & GYNECOLOGY
Payer: COMMERCIAL

## 2022-08-16 VITALS
DIASTOLIC BLOOD PRESSURE: 84 MMHG | RESPIRATION RATE: 20 BRPM | WEIGHT: 209 LBS | HEIGHT: 66 IN | HEART RATE: 108 BPM | OXYGEN SATURATION: 98 % | TEMPERATURE: 99 F | SYSTOLIC BLOOD PRESSURE: 131 MMHG

## 2022-08-16 DIAGNOSIS — R78.81 BACTEREMIA: ICD-10-CM

## 2022-08-16 LAB
-  FUSOBACTERIUM NUCLEATUM: SIGNIFICANT CHANGE UP
ALBUMIN SERPL ELPH-MCNC: 3.7 G/DL — SIGNIFICANT CHANGE UP (ref 3.3–5)
ALP SERPL-CCNC: 72 U/L — SIGNIFICANT CHANGE UP (ref 40–120)
ALT FLD-CCNC: 18 U/L — SIGNIFICANT CHANGE UP (ref 10–45)
ANION GAP SERPL CALC-SCNC: 13 MMOL/L — SIGNIFICANT CHANGE UP (ref 5–17)
APPEARANCE UR: CLEAR — SIGNIFICANT CHANGE UP
APTT BLD: 25.3 SEC — LOW (ref 27.5–35.5)
AST SERPL-CCNC: 19 U/L — SIGNIFICANT CHANGE UP (ref 10–40)
BACTERIA # UR AUTO: NEGATIVE — SIGNIFICANT CHANGE UP
BASE EXCESS BLDV CALC-SCNC: -13.6 MMOL/L — LOW (ref -2–3)
BASE EXCESS BLDV CALC-SCNC: 1.2 MMOL/L — SIGNIFICANT CHANGE UP (ref -2–3)
BASOPHILS # BLD AUTO: 0.04 K/UL — SIGNIFICANT CHANGE UP (ref 0–0.2)
BASOPHILS NFR BLD AUTO: 0.3 % — SIGNIFICANT CHANGE UP (ref 0–2)
BILIRUB SERPL-MCNC: 0.2 MG/DL — SIGNIFICANT CHANGE UP (ref 0.2–1.2)
BILIRUB UR-MCNC: NEGATIVE — SIGNIFICANT CHANGE UP
BLD GP AB SCN SERPL QL: NEGATIVE — SIGNIFICANT CHANGE UP
BUN SERPL-MCNC: 9 MG/DL — SIGNIFICANT CHANGE UP (ref 7–23)
CA-I SERPL-SCNC: 1.19 MMOL/L — SIGNIFICANT CHANGE UP (ref 1.15–1.33)
CA-I SERPL-SCNC: SIGNIFICANT CHANGE UP MMOL/L (ref 1.15–1.33)
CALCIUM SERPL-MCNC: 8.8 MG/DL — SIGNIFICANT CHANGE UP (ref 8.4–10.5)
CHLORIDE BLDV-SCNC: 105 MMOL/L — SIGNIFICANT CHANGE UP (ref 96–108)
CHLORIDE BLDV-SCNC: 109 MMOL/L — HIGH (ref 96–108)
CHLORIDE SERPL-SCNC: 105 MMOL/L — SIGNIFICANT CHANGE UP (ref 96–108)
CMV IGM FLD-ACNC: <8 AU/ML — SIGNIFICANT CHANGE UP
CMV IGM SERPL QL: NEGATIVE — SIGNIFICANT CHANGE UP
CMV IGM SERPL-ACNC: <30 AU/ML — SIGNIFICANT CHANGE UP (ref 0–29.9)
CO2 BLDV-SCNC: 19 MMOL/L — LOW (ref 22–26)
CO2 BLDV-SCNC: 27 MMOL/L — HIGH (ref 22–26)
CO2 SERPL-SCNC: 21 MMOL/L — LOW (ref 22–31)
COLOR SPEC: SIGNIFICANT CHANGE UP
CREAT SERPL-MCNC: 0.91 MG/DL — SIGNIFICANT CHANGE UP (ref 0.5–1.3)
DIFF PNL FLD: ABNORMAL
EGFR: 88 ML/MIN/1.73M2 — SIGNIFICANT CHANGE UP
EOSINOPHIL # BLD AUTO: 0.33 K/UL — SIGNIFICANT CHANGE UP (ref 0–0.5)
EOSINOPHIL NFR BLD AUTO: 2.5 % — SIGNIFICANT CHANGE UP (ref 0–6)
EPI CELLS # UR: 4 /HPF — SIGNIFICANT CHANGE UP
FIBRINOGEN PPP-MCNC: 648 MG/DL — HIGH (ref 330–520)
GAS PNL BLDV: 118 MMOL/L — CRITICAL LOW (ref 136–145)
GAS PNL BLDV: 136 MMOL/L — SIGNIFICANT CHANGE UP (ref 136–145)
GAS PNL BLDV: SIGNIFICANT CHANGE UP
GLUCOSE BLDV-MCNC: 76 MG/DL — SIGNIFICANT CHANGE UP (ref 70–99)
GLUCOSE BLDV-MCNC: 78 MG/DL — SIGNIFICANT CHANGE UP (ref 70–99)
GLUCOSE SERPL-MCNC: 90 MG/DL — SIGNIFICANT CHANGE UP (ref 70–99)
GLUCOSE UR QL: NEGATIVE — SIGNIFICANT CHANGE UP
GRAM STN FLD: SIGNIFICANT CHANGE UP
HCO3 BLDV-SCNC: 17 MMOL/L — LOW (ref 22–29)
HCO3 BLDV-SCNC: 26 MMOL/L — SIGNIFICANT CHANGE UP (ref 22–29)
HCT VFR BLD CALC: 24.7 % — LOW (ref 34.5–45)
HCT VFR BLDA CALC: 22 % — LOW (ref 34.5–46.5)
HCT VFR BLDA CALC: 24 % — LOW (ref 34.5–46.5)
HGB BLD CALC-MCNC: 7.4 G/DL — LOW (ref 11.7–16.1)
HGB BLD CALC-MCNC: 8.1 G/DL — LOW (ref 11.7–16.1)
HGB BLD-MCNC: 7.9 G/DL — LOW (ref 11.5–15.5)
HSV 1+2 IGM SER-IMP: <0.91 RATIO — SIGNIFICANT CHANGE UP (ref 0–0.9)
HYALINE CASTS # UR AUTO: 1 /LPF — SIGNIFICANT CHANGE UP (ref 0–2)
IMM GRANULOCYTES NFR BLD AUTO: 3.8 % — HIGH (ref 0–1.5)
INR BLD: 0.87 RATIO — LOW (ref 0.88–1.16)
KETONES UR-MCNC: NEGATIVE — SIGNIFICANT CHANGE UP
LACTATE BLDV-MCNC: 0.8 MMOL/L — SIGNIFICANT CHANGE UP (ref 0.5–2)
LACTATE BLDV-MCNC: 1.5 MMOL/L — SIGNIFICANT CHANGE UP (ref 0.5–2)
LEUKOCYTE ESTERASE UR-ACNC: ABNORMAL
LYMPHOCYTES # BLD AUTO: 17.5 % — SIGNIFICANT CHANGE UP (ref 13–44)
LYMPHOCYTES # BLD AUTO: 2.27 K/UL — SIGNIFICANT CHANGE UP (ref 1–3.3)
MCHC RBC-ENTMCNC: 27 PG — SIGNIFICANT CHANGE UP (ref 27–34)
MCHC RBC-ENTMCNC: 32 GM/DL — SIGNIFICANT CHANGE UP (ref 32–36)
MCV RBC AUTO: 84.3 FL — SIGNIFICANT CHANGE UP (ref 80–100)
METHOD TYPE: SIGNIFICANT CHANGE UP
MEV IGM SER-ACNC: <20 AU/ML — SIGNIFICANT CHANGE UP (ref 0–19.9)
MONOCYTES # BLD AUTO: 0.85 K/UL — SIGNIFICANT CHANGE UP (ref 0–0.9)
MONOCYTES NFR BLD AUTO: 6.6 % — SIGNIFICANT CHANGE UP (ref 2–14)
NEUTROPHILS # BLD AUTO: 8.97 K/UL — HIGH (ref 1.8–7.4)
NEUTROPHILS NFR BLD AUTO: 69.3 % — SIGNIFICANT CHANGE UP (ref 43–77)
NITRITE UR-MCNC: NEGATIVE — SIGNIFICANT CHANGE UP
NRBC # BLD: 0 /100 WBCS — SIGNIFICANT CHANGE UP (ref 0–0)
OTHER CELLS CSF MANUAL: 11.2 ML/DL — LOW (ref 18–22)
PCO2 BLDV: 41 MMHG — SIGNIFICANT CHANGE UP (ref 39–42)
PCO2 BLDV: 65 MMHG — HIGH (ref 39–42)
PH BLDV: 7.02 — CRITICAL LOW (ref 7.32–7.43)
PH BLDV: 7.41 — SIGNIFICANT CHANGE UP (ref 7.32–7.43)
PH UR: 7.5 — SIGNIFICANT CHANGE UP (ref 5–8)
PLATELET # BLD AUTO: 171 K/UL — SIGNIFICANT CHANGE UP (ref 150–400)
PO2 BLDV: 101 MMHG — HIGH (ref 25–45)
PO2 BLDV: 43 MMHG — SIGNIFICANT CHANGE UP (ref 25–45)
POTASSIUM BLDV-SCNC: 4.6 MMOL/L — SIGNIFICANT CHANGE UP (ref 3.5–5.1)
POTASSIUM BLDV-SCNC: SIGNIFICANT CHANGE UP MMOL/L (ref 3.5–5.1)
POTASSIUM SERPL-MCNC: 4.5 MMOL/L — SIGNIFICANT CHANGE UP (ref 3.5–5.3)
POTASSIUM SERPL-SCNC: 4.5 MMOL/L — SIGNIFICANT CHANGE UP (ref 3.5–5.3)
PROT SERPL-MCNC: 6.3 G/DL — SIGNIFICANT CHANGE UP (ref 6–8.3)
PROT UR-MCNC: ABNORMAL
PROTHROM AB SERPL-ACNC: 10 SEC — LOW (ref 10.5–13.4)
RBC # BLD: 2.93 M/UL — LOW (ref 3.8–5.2)
RBC # FLD: 15.9 % — HIGH (ref 10.3–14.5)
RBC CASTS # UR COMP ASSIST: 150 /HPF — HIGH (ref 0–4)
RH IG SCN BLD-IMP: POSITIVE — SIGNIFICANT CHANGE UP
SAO2 % BLDV: 72.9 % — SIGNIFICANT CHANGE UP (ref 67–88)
SAO2 % BLDV: 99.1 % — HIGH (ref 67–88)
SARS-COV-2 RNA SPEC QL NAA+PROBE: SIGNIFICANT CHANGE UP
SODIUM SERPL-SCNC: 139 MMOL/L — SIGNIFICANT CHANGE UP (ref 135–145)
SP GR SPEC: 1.02 — SIGNIFICANT CHANGE UP (ref 1.01–1.02)
T GONDII IGM SER IA-ACNC: <3 AU/ML — SIGNIFICANT CHANGE UP (ref 0–7.9)
UROBILINOGEN FLD QL: NEGATIVE — SIGNIFICANT CHANGE UP
WBC # BLD: 12.95 K/UL — HIGH (ref 3.8–10.5)
WBC # FLD AUTO: 12.95 K/UL — HIGH (ref 3.8–10.5)
WBC UR QL: 18 /HPF — HIGH (ref 0–5)

## 2022-08-16 PROCEDURE — 74177 CT ABD & PELVIS W/CONTRAST: CPT | Mod: 26

## 2022-08-16 PROCEDURE — 99285 EMERGENCY DEPT VISIT HI MDM: CPT | Mod: 25

## 2022-08-16 PROCEDURE — 99254 IP/OBS CNSLTJ NEW/EST MOD 60: CPT

## 2022-08-16 PROCEDURE — 93010 ELECTROCARDIOGRAM REPORT: CPT

## 2022-08-16 RX ORDER — ENOXAPARIN SODIUM 100 MG/ML
40 INJECTION SUBCUTANEOUS EVERY 24 HOURS
Refills: 0 | Status: DISCONTINUED | OUTPATIENT
Start: 2022-08-16 | End: 2022-08-19

## 2022-08-16 RX ORDER — NIFEDIPINE 30 MG
30 TABLET, EXTENDED RELEASE 24 HR ORAL DAILY
Refills: 0 | Status: DISCONTINUED | OUTPATIENT
Start: 2022-08-16 | End: 2022-08-18

## 2022-08-16 RX ORDER — PIPERACILLIN AND TAZOBACTAM 4; .5 G/20ML; G/20ML
3.38 INJECTION, POWDER, LYOPHILIZED, FOR SOLUTION INTRAVENOUS EVERY 8 HOURS
Refills: 0 | Status: DISCONTINUED | OUTPATIENT
Start: 2022-08-16 | End: 2022-08-16

## 2022-08-16 RX ORDER — PIPERACILLIN AND TAZOBACTAM 4; .5 G/20ML; G/20ML
3.38 INJECTION, POWDER, LYOPHILIZED, FOR SOLUTION INTRAVENOUS ONCE
Refills: 0 | Status: COMPLETED | OUTPATIENT
Start: 2022-08-16 | End: 2022-08-16

## 2022-08-16 RX ORDER — SODIUM CHLORIDE 9 MG/ML
1000 INJECTION, SOLUTION INTRAVENOUS ONCE
Refills: 0 | Status: COMPLETED | OUTPATIENT
Start: 2022-08-16 | End: 2022-08-16

## 2022-08-16 RX ORDER — AMPICILLIN SODIUM AND SULBACTAM SODIUM 250; 125 MG/ML; MG/ML
3 INJECTION, POWDER, FOR SUSPENSION INTRAMUSCULAR; INTRAVENOUS EVERY 6 HOURS
Refills: 0 | Status: DISCONTINUED | OUTPATIENT
Start: 2022-08-16 | End: 2022-08-18

## 2022-08-16 RX ORDER — ACETAMINOPHEN 500 MG
650 TABLET ORAL EVERY 6 HOURS
Refills: 0 | Status: DISCONTINUED | OUTPATIENT
Start: 2022-08-16 | End: 2022-08-19

## 2022-08-16 RX ORDER — IBUPROFEN 200 MG
600 TABLET ORAL EVERY 6 HOURS
Refills: 0 | Status: DISCONTINUED | OUTPATIENT
Start: 2022-08-16 | End: 2022-08-19

## 2022-08-16 RX ADMIN — Medication 600 MILLIGRAM(S): at 17:03

## 2022-08-16 RX ADMIN — SODIUM CHLORIDE 1000 MILLILITER(S): 9 INJECTION, SOLUTION INTRAVENOUS at 07:45

## 2022-08-16 RX ADMIN — ENOXAPARIN SODIUM 40 MILLIGRAM(S): 100 INJECTION SUBCUTANEOUS at 21:16

## 2022-08-16 RX ADMIN — PIPERACILLIN AND TAZOBACTAM 200 GRAM(S): 4; .5 INJECTION, POWDER, LYOPHILIZED, FOR SOLUTION INTRAVENOUS at 09:43

## 2022-08-16 RX ADMIN — PIPERACILLIN AND TAZOBACTAM 25 GRAM(S): 4; .5 INJECTION, POWDER, LYOPHILIZED, FOR SOLUTION INTRAVENOUS at 17:03

## 2022-08-16 RX ADMIN — Medication 600 MILLIGRAM(S): at 23:39

## 2022-08-16 RX ADMIN — Medication 30 MILLIGRAM(S): at 11:42

## 2022-08-16 RX ADMIN — Medication 650 MILLIGRAM(S): at 23:09

## 2022-08-16 RX ADMIN — Medication 650 MILLIGRAM(S): at 11:40

## 2022-08-16 RX ADMIN — Medication 600 MILLIGRAM(S): at 23:09

## 2022-08-16 RX ADMIN — Medication 1 TABLET(S): at 11:40

## 2022-08-16 RX ADMIN — Medication 650 MILLIGRAM(S): at 17:03

## 2022-08-16 RX ADMIN — AMPICILLIN SODIUM AND SULBACTAM SODIUM 200 GRAM(S): 250; 125 INJECTION, POWDER, FOR SUSPENSION INTRAMUSCULAR; INTRAVENOUS at 18:16

## 2022-08-16 RX ADMIN — Medication 650 MILLIGRAM(S): at 23:39

## 2022-08-16 RX ADMIN — Medication 600 MILLIGRAM(S): at 11:41

## 2022-08-16 NOTE — ED ADULT NURSE NOTE - OTHER COMPLAINTS
Call made to OB Resident #[id number], ED RN made aware patient to be seen and evaluated by ED team and OB-GYN to be consulted.

## 2022-08-16 NOTE — ED PROVIDER NOTE - NS ED ROS FT
CONST: no fevers, no chills  EYES: no pain, no vision changes  ENT: no sore throat, no ear pain, no change in hearing  CV: no chest pain, no leg swelling  RESP: no shortness of breath, no cough  ABD:  no nausea, no vomiting, no diarrhea. +abdominal cramping  : no dysuria, no flank pain, no hematuria. +vaginal bleeding  MSK: no back pain, no extremity pain  NEURO: no headache or additional neurologic complaints  SKIN:  no rash

## 2022-08-16 NOTE — ED PROVIDER NOTE - ATTENDING CONTRIBUTION TO CARE
MD Ayers:  patient seen and evaluated with the resident.  I was present for key portions of the History & Physical, and I agree with the Impression & Plan.  MD Ayers: 25 yo F, 3d s/p vaginal delivery of IUFD at 30wk ega.    Context:  IUFD was also complicated by preeclampsia and early DIC.  Patient had BCx send on 8/13/22, that turned positive after dc home (positive for gram negative rods).   Associated Sx:  +chills and fatigue, mild lower abd pain.   Location: mild suprapubic TTP.   VS: +tachycardia, normotensive, afebrile  Physical Exam: adult F, NAD, NCAT, PERRL, EOMI, neck supple, RRR, CTA B  Abd: s/nd/+suprapubic TTP  Ext: no edema, Neuro: AAOx3, ambulates w/o diff, strength 5/5 & symmetric throughout.  Impression:  bacteremia of unk etiology, 3d s/p delivery of stillborn fetus  Plan:  Pan-Culture, empiric abx (given tachycardia and subjective chills), d/w OB/GYN need for TVUS to assess for preeclampsia.

## 2022-08-16 NOTE — ED ADULT NURSE REASSESSMENT NOTE - NS ED NURSE REASSESS COMMENT FT1
Pt received from KAT Barron a&ox4. No complaints at this time. No distress noted. Respirations even and unlabored. LR infusing as ordered, VBG to be drawn after LR. Zosyn on hold until pt provides urine sample. Pt made aware to provide urine sample.

## 2022-08-16 NOTE — ED PROVIDER NOTE - OBJECTIVE STATEMENT
26y female pt  with no significant PMH who presents to ED for bacteremia in blood. Was called to visit ED for further evaluation. Endorsing light vaginal bleeding and mild abdominal cramping. Has been taking tylenol with some improvement. Denies fevers, chills, n/v/d, chest pain, sob or coughing.

## 2022-08-16 NOTE — H&P ADULT - HISTORY OF PRESENT ILLNESS
27yo  PPD#3 s/p VD for IUFD @30w6d c/b sPEC s/p Mg and concern for DIC. Pt asked to return to the ED after blood cultures drawn on admission demonstrate growth of Gram Negative Rods. Pt feels well. Denies fever/chills/ myalgias. Lochia is light and dark brown without foul smell or purulent appearance Pt notes mild abdominal cramping, well controlled with NSAID and acetaminophen use. Pt missed her AM dose of Procardia 2/2 to ED presentation, but denies HA, vision changes, RUQ pain, CP, SOB, dyspnea, or significant edema. Pt is voiding spontaneously without dysuria. Tolerating a regular diet without nausea/ vomiting.     Physician: [facility]   OB:     G1 - 2017- SAB no D&C    G2- As above.   GynHx: h/o recurrent BV, chlamydia s/p multiple therapies, denies h/o other STIs, ovarian cysts, or fibroids  PMH: chronic Hepatitis B  PSH: denies  Psych: denies   Social: denies   Meds: PNV   Allergies: NKDA  Will accept blood transfusions? Yes    REVIEW OF SYSTEMS  General: denies fevers, chills, tiredness  Skin/Breast: denies breast pain  Respiratory and Thorax: denies shortness of breath, denies cough  Cardiovascular: denies chest pain and denies palpitations  Gastrointestinal: denies abdominal pain, nausea/ vomiting	  Genitourinary: denies dysuria, increased urinary frequency, urgency	  Constitutional, Cardiovascular, Respiratory, Gastrointestinal, Genitourinary, Musculoskeletal and Integumentary review of systems are normal except as noted. 	      Vital Signs Last 24 Hrs  T(C): 36.9 (16 Aug 2022 07:51), Max: 37.2 (16 Aug 2022 05:35)  T(F): 98.4 (16 Aug 2022 07:51), Max: 98.9 (16 Aug 2022 05:35)  HR: 93 (16 Aug 2022 07:51) (93 - 108)  BP: 128/85 (16 Aug 2022 07:51) (116/78 - 131/84)  BP(mean): --  RR: 18 (16 Aug 2022 07:51) (18 - 20)  SpO2: 100% (16 Aug 2022 07:51) (98% - 100%)    Parameters below as of 16 Aug 2022 07:51  Patient On (Oxygen Delivery Method): room air    PHYSICAL EXAM:   Gen: NAD, alert and oriented x 3  Cardiovascular: regular   Respiratory: breathing comfortably on RA  No bleeding noted on gums or at IV site.   Abd: soft, non tender, non-distended. Uterus is appropriately tender. Firm, midline, and below the umbilicus.   Pelvic: Normal lochia noted on pad. Not saturated.   Extremities: NTBL. No edema.   Skin: warm and well perfused    LABS:                        7.9    12.95 )-----------( 171      ( 16 Aug 2022 06:54 )             24.7     08-16    139  |  105  |  9   ----------------------------<  90  4.5   |  21<L>  |  0.91    Ca    8.8      16 Aug 2022 06:55  Mg     4.9     08-15    TPro  6.3  /  Alb  3.7  /  TBili  0.2  /  DBili  <0.1  /  AST  19  /  ALT  18  /  AlkPhos  72  08-16    PT/INR - ( 16 Aug 2022 06:55 )   PT: 10.0 sec;   INR: 0.87 ratio      PTT - ( 16 Aug 2022 06:55 )  PTT:25.3 sec

## 2022-08-16 NOTE — ED PROVIDER NOTE - PROGRESS NOTE DETAILS
OB aware patient is in the ED. Will come see patient. Attending note (Jose Miguel): patient endorsed to me at signout: 27 yo F, s/p vaginal delivery of IUFD at 30wk ega, 3 days; presenting with apparent bacteremia (blood culture x 1 of 2 positive for gram - rods / fuseobacteria); at present is well appearing, afebrile, in NAD, c/o only mild abd cramping and slight vag bleeding (improving since delivery); repeat labs sent, vbg with pH 7.0 and significant hyponatremia (does not fit with overall clinical picture of very well appearing patient). ON exam, patient is awake/alert, appears in NAD/nontoxic appearing, abd soft nt/nd. No peripheral edema. Pending results of rest of labs, dic work-up, and ob consultation; likely repeat vbg to determine if accurate vs contaminated sample.  disposition pending review of labs and ob consultation.

## 2022-08-16 NOTE — ED ADULT NURSE REASSESSMENT NOTE - NS ED NURSE REASSESS COMMENT FT1
AS per ED attending Nogar- zosyn to be held until pt can provide urine sample. Pt made aware to provide urine sample.

## 2022-08-16 NOTE — H&P ADULT - ASSESSMENT
27yo  PPD#3 s/p VD for IUFD @30w6d c/b sPEC s/p Mg and concern for DIC. Blood cultures drawn  demonstrate growth of Gram Negative Rods. Pt feels well and is currently asymptomatic     #Bacteremia   GBS - Not detected.   GC/Chlamydia- Not detected.   TORCH labs: + IgG for CMV, IgM is negative   Urine culture (): Negative   Blood culture () - f/u final culture result.   Repeat blood and urine cultures drawn today (). F/u results.    Received Zosyn X1 in ED   Appreciate infectious disease recommendations.     #Pre-eclampsia with severe features.   S/p Magnesium x24h post partum.   C/W Procardia 30xl  CBC/CMP without abnormality    Continue close monitoring of vital signs.     #Hz of DIC on recent admission.   No concern for active DIC in the setting of well controlled bleeding. Pt will require outpatient evaluation from Hematology.      DW: Dr. Charley Mathews, PGY-2    Speaking Coherently

## 2022-08-16 NOTE — CONSULT NOTE ADULT - ASSESSMENT
25yo  PPD#3 s/p VD for IUFD @30w6d c/b sPEC s/p Mg and concern for DIC. Pt asked to return to the ED after blood cultures drawn on admission demonstrate growth of Gram Negative Rods.    Overall sepsis, leucocytosis, tachycardia on presentation, fusobacterium bacteremia.    PLAN:  recommend to switch zosyn to unasyn 3 gm iv q6h   repeat blood cx in lab  U/A with some pyuria, pt with no symptoms   follow up urine cx  trend cbc for leucocytosis  recommend CT abd/pelvis with contrast to r/o thrombophlebitis     Plan discussed with Ob resident     Ivon La  Please contact through MS Teams   If no response or past 5 pm/weekend call [phone].

## 2022-08-16 NOTE — ED ADULT TRIAGE NOTE - CHIEF COMPLAINT QUOTE
Seen and d/c from L&D Monday after still birth delivery saturday. Patient returns today after receiving call for bacteria in bloodwork.

## 2022-08-16 NOTE — ED ADULT NURSE NOTE - OBJECTIVE STATEMENT
26y female PMH preeclampsia to the ED via triage c/o of abnormal lab result. Pt reports that pt was called to go to the ER today for positive blood cultures. Pt was discharged yesterday afternoon (Monday) from L&D s/p stillborn delivery on Saturday. Pt reports that during the stay pt did experience some fever and chills- hence the drawing of blood cultures. Pt reports some abdominal discomfort at this time but states "they told me that would be very normal." Pt denies chest pain, palpitations, shortness of breath, headache, visual disturbances, numbness/tingling, fever, chills, diaphoresis,  nausea, vomiting, constipation, diarrhea, or urinary symptoms. Stretcher in lowest position and locked, appropriate side rails in place, room cleared of clutter and safety hazards, call bell in reach- pt oriented to use, blankets given for comfort

## 2022-08-16 NOTE — ED PROVIDER NOTE - PHYSICAL EXAMINATION
GENERAL: Awake, alert, NAD  HEENT: NC/AT, moist mucous membranes, EOMI  LUNGS: CTAB, no wheezes or crackles   CARDIAC: RRR, no m/r/g  ABDOMEN: Soft, non tender, no rebound, no guarding  EXT: No edema, no calf tenderness, 2+ PT pulses BL  NEURO: A&Ox3. Moving all extremities.  SKIN: Warm and dry. No rash.

## 2022-08-16 NOTE — H&P ADULT - ATTENDING COMMENTS
PT SEEN ON [facility] AT 1900. JUST ARRIVED  FROM ER. NO C/O. RECEIVED ZOSYN. ID REC SWITCH TO UNASYN.  MANAGEMENT DISCUSSED WITH PT    BENSON RICE

## 2022-08-16 NOTE — ED PROVIDER NOTE - CLINICAL SUMMARY MEDICAL DECISION MAKING FREE TEXT BOX
Impression:  bacteremia of unk etiology, 3d s/p delivery of stillborn fetus  Plan:  Pan-Culture, empiric abx (given tachycardia and subjective chills), d/w OB/GYN need for TVUS to assess for preeclampsia.

## 2022-08-16 NOTE — ED ADULT TRIAGE NOTE - OTHER COMPLAINTS
Call made to OB Resident #[id number], ED RN made aware patient to be seen and evaluated by ED team and OB-GYN to be consulted.

## 2022-08-16 NOTE — CONSULT NOTE ADULT - SUBJECTIVE AND OBJECTIVE BOX
Patient is a 26y old  Female who presents with a chief complaint of     HPI:  25yo  PPD#3 s/p VD for IUFD @30w6d c/b sPEC s/p Mg and concern for DIC. Pt asked to return to the ED after blood cultures drawn on admission demonstrate growth of Gram Negative Rods. Pt feels well. Denies fever/chills/ myalgias. Lochia is light and dark brown without foul smell or purulent appearance Pt notes mild abdominal cramping, well controlled with NSAID and acetaminophen use. Pt missed her AM dose of Procardia 2/2 to ED presentation, but denies HA, vision changes, RUQ pain, CP, SOB, dyspnea, or significant edema. Pt is voiding spontaneously without dysuria. Tolerating a regular diet without nausea/ vomiting.     Physician: [facility]   OB:     G1 - 2017- SAB no D&C    G2- As above.   GynHx: h/o recurrent BV, chlamydia s/p multiple therapies, denies h/o other STIs, ovarian cysts, or fibroids  PMH: chronic Hepatitis B  PSH: denies  ABOVE REVIEWED:   Pt feels well, denies much abdominal pain, mild discomfort, no N/V.         PAST MEDICAL & SURGICAL HISTORY:  No pertinent past medical history          REVIEW OF SYSTEMS    General: No Fevers, no chills     Skin: No rash  	  Ophthalmologic: Denies any discharge, redness.  	  ENT: No nasal congestion or throat pain.     Respiratory and Thorax: No cough, sputum. Denies shortness of breath.  	  Cardiovascular: No chest pain,     Gastrointestinal: No nausea, mild abdominal pain, no diarrhea.    Genitourinary: No dysuria,     Musculoskeletal: No joint swelling     Neurological: No new extremity weakness.    Psychiatric: No hallucinations	    Extremities: No swelling     Endocrine: No abnormal heat or cold intolerance     Allergic/Immunologic:	No hives        Social history:  , no smoking       FAMILY HISTORY:  No pertinent family hx of DM in first degree relative.       Allergies  No Known Allergies        Antimicrobials:  piperacillin/tazobactam IVPB.. 3.375 Gram(s) IV Intermittent every 8 hours        Vital Signs Last 24 Hrs  T(C): 36.9 (16 Aug 2022 12:49), Max: 37.2 (16 Aug 2022 05:35)  T(F): 98.5 (16 Aug 2022 12:49), Max: 98.9 (16 Aug 2022 05:35)  HR: 86 (16 Aug 2022 12:49) (86 - 108)  BP: 133/90 (16 Aug 2022 12:49) (128/85 - 133/90)  BP(mean): --  RR: 18 (16 Aug 2022 12:49) (18 - 20)  SpO2: 100% (16 Aug 2022 12:49) (98% - 100%)    Parameters below as of 16 Aug 2022 12:49  Patient On (Oxygen Delivery Method): room air        PHYSICAL EXAM: Patient in no acute distress.    Constitutional: Comfortable. Awake and alert    Eyes: No discharge or conjunctival injection    ENT: No thrush. No pharyngeal erythema.    Neck: Supple,     Respiratory:  + air entry bilaterally.    Cardiovascular: S1 S2 wnl,     Gastrointestinal: Soft BS(+) no tenderness, non distended.    Genitourinary: No CVA tenderness     Extremities: No edema.    Vascular: peripheral pulses felt    Neurological: No new gross focal deficits.    Skin: No rash     Musculoskeletal: No joint swelling.    Psychiatric: Affect normal.                              7.9    12.95 )-----------( 171      ( 16 Aug 2022 06:54 )             24.7       08-16    139  |  105  |  9   ----------------------------<  90  4.5   |  21<L>  |  0.91    Ca    8.8      16 Aug 2022 06:55  Mg     4.9     08-15    TPro  6.3  /  Alb  3.7  /  TBili  0.2  /  DBili  <0.1  /  AST  19  /  ALT  18  /  AlkPhos  72  08-16      Urinalysis (22 @ 09:45)   pH Urine: 7.5   Glucose Qualitative, Urine: Negative   Blood, Urine: Large   Color: Light Yellow   Urine Appearance: Clear   Bilirubin: Negative   Ketone - Urine: Negative   Specific Gravity: 1.017   Protein, Urine: 30 mg/dL   Urobilinogen: Negative   Nitrite: Negative   Leukocyte Esterase Concentration: Moderate   Urine Microscopic-Add On (NC) (22 @ 09:45)   Red Blood Cell - Urine: 150 /hpf   White Blood Cell - Urine: 18 /HPF   Hyaline Casts: 1 /lpf   Bacteria: Negative   Epithelial Cells: 4 /hpf     Chlamydia trachomatis/Neisseria gonorrhoeae, Urine (08.14.22 @ 13:46)   Chlamydia Amplification Result: NotDetec  GC Amplification Result: NotDetec    Culture - Surgical Swab (collected 13 Aug 2022 20:37)  Source: .Surgical Swab placenta fetal  Preliminary Report (14 Aug 2022 21:47):    No growth    Culture - Surgical Swab (collected 13 Aug 2022 20:36)  Source: .Surgical Swab placenta maternal  Preliminary Report (15 Aug 2022 23:18):    Rare Staphylococcus hominis    Culture - Blood (collected 13 Aug 2022 18:50)  Source: .Blood Blood-Peripheral  Gram Stain (16 Aug 2022 02:24):    Growth in anaerobic bottle: Gram Negative Rods  Preliminary Report (16 Aug 2022 02:24):    Growth in anaerobic bottle: Gram Negative Rods    ***Blood Panel PCR results on this specimen are available    approximately 3 hours after the Gram stain result.***    Gram stain, PCR, and/or culture results may not always    correspond due to difference in methodologies.    ************************************************************    This PCR assay was performed by multiplex PCR. This    Assay tests for 66 bacterial and resistance gene targets.    Please refer to the [facility] test directory    at [email or web address] for details.  Organism: Blood Culture PCR (16 Aug 2022 05:05)  Organism: Blood Culture PCR (16 Aug 2022 05:05)          Radiology: No imaging performed.

## 2022-08-17 ENCOUNTER — APPOINTMENT (OUTPATIENT)
Dept: CARDIOLOGY | Facility: CLINIC | Age: 27
End: 2022-08-17

## 2022-08-17 DIAGNOSIS — R78.81 BACTEREMIA: ICD-10-CM

## 2022-08-17 LAB
-  AMPICILLIN/SULBACTAM: SIGNIFICANT CHANGE UP
-  CEFAZOLIN: SIGNIFICANT CHANGE UP
-  CLINDAMYCIN: SIGNIFICANT CHANGE UP
-  ERYTHROMYCIN: SIGNIFICANT CHANGE UP
-  GENTAMICIN: SIGNIFICANT CHANGE UP
-  OXACILLIN: SIGNIFICANT CHANGE UP
-  PENICILLIN: SIGNIFICANT CHANGE UP
-  RIFAMPIN: SIGNIFICANT CHANGE UP
-  TETRACYCLINE: SIGNIFICANT CHANGE UP
-  TRIMETHOPRIM/SULFAMETHOXAZOLE: SIGNIFICANT CHANGE UP
-  VANCOMYCIN: SIGNIFICANT CHANGE UP
ALBUMIN SERPL ELPH-MCNC: 3.7 G/DL — SIGNIFICANT CHANGE UP (ref 3.3–5)
ALP SERPL-CCNC: 70 U/L — SIGNIFICANT CHANGE UP (ref 40–120)
ALT FLD-CCNC: 27 U/L — SIGNIFICANT CHANGE UP (ref 10–45)
ANION GAP SERPL CALC-SCNC: 10 MMOL/L — SIGNIFICANT CHANGE UP (ref 5–17)
AST SERPL-CCNC: 30 U/L — SIGNIFICANT CHANGE UP (ref 10–40)
BASOPHILS # BLD AUTO: 0.05 K/UL — SIGNIFICANT CHANGE UP (ref 0–0.2)
BASOPHILS NFR BLD AUTO: 0.5 % — SIGNIFICANT CHANGE UP (ref 0–2)
BILIRUB SERPL-MCNC: 0.2 MG/DL — SIGNIFICANT CHANGE UP (ref 0.2–1.2)
BUN SERPL-MCNC: 8 MG/DL — SIGNIFICANT CHANGE UP (ref 7–23)
CALCIUM SERPL-MCNC: 9.1 MG/DL — SIGNIFICANT CHANGE UP (ref 8.4–10.5)
CHLORIDE SERPL-SCNC: 104 MMOL/L — SIGNIFICANT CHANGE UP (ref 96–108)
CO2 SERPL-SCNC: 25 MMOL/L — SIGNIFICANT CHANGE UP (ref 22–31)
CREAT SERPL-MCNC: 0.84 MG/DL — SIGNIFICANT CHANGE UP (ref 0.5–1.3)
CULTURE RESULTS: SIGNIFICANT CHANGE UP
EGFR: 97 ML/MIN/1.73M2 — SIGNIFICANT CHANGE UP
EOSINOPHIL # BLD AUTO: 0.49 K/UL — SIGNIFICANT CHANGE UP (ref 0–0.5)
EOSINOPHIL NFR BLD AUTO: 5 % — SIGNIFICANT CHANGE UP (ref 0–6)
GLUCOSE SERPL-MCNC: 81 MG/DL — SIGNIFICANT CHANGE UP (ref 70–99)
HCT VFR BLD CALC: 24.3 % — LOW (ref 34.5–45)
HGB BLD-MCNC: 7.7 G/DL — LOW (ref 11.5–15.5)
IMM GRANULOCYTES NFR BLD AUTO: 5.1 % — HIGH (ref 0–1.5)
LYMPHOCYTES # BLD AUTO: 2 K/UL — SIGNIFICANT CHANGE UP (ref 1–3.3)
LYMPHOCYTES # BLD AUTO: 20.5 % — SIGNIFICANT CHANGE UP (ref 13–44)
MCHC RBC-ENTMCNC: 27.4 PG — SIGNIFICANT CHANGE UP (ref 27–34)
MCHC RBC-ENTMCNC: 31.7 GM/DL — LOW (ref 32–36)
MCV RBC AUTO: 86.5 FL — SIGNIFICANT CHANGE UP (ref 80–100)
METHOD TYPE: SIGNIFICANT CHANGE UP
MONOCYTES # BLD AUTO: 0.58 K/UL — SIGNIFICANT CHANGE UP (ref 0–0.9)
MONOCYTES NFR BLD AUTO: 5.9 % — SIGNIFICANT CHANGE UP (ref 2–14)
NEUTROPHILS # BLD AUTO: 6.13 K/UL — SIGNIFICANT CHANGE UP (ref 1.8–7.4)
NEUTROPHILS NFR BLD AUTO: 63 % — SIGNIFICANT CHANGE UP (ref 43–77)
NRBC # BLD: 0 /100 WBCS — SIGNIFICANT CHANGE UP (ref 0–0)
PLATELET # BLD AUTO: 217 K/UL — SIGNIFICANT CHANGE UP (ref 150–400)
POTASSIUM SERPL-MCNC: 4.2 MMOL/L — SIGNIFICANT CHANGE UP (ref 3.5–5.3)
POTASSIUM SERPL-SCNC: 4.2 MMOL/L — SIGNIFICANT CHANGE UP (ref 3.5–5.3)
PROT SERPL-MCNC: 6.5 G/DL — SIGNIFICANT CHANGE UP (ref 6–8.3)
RBC # BLD: 2.81 M/UL — LOW (ref 3.8–5.2)
RBC # FLD: 15.6 % — HIGH (ref 10.3–14.5)
SODIUM SERPL-SCNC: 139 MMOL/L — SIGNIFICANT CHANGE UP (ref 135–145)
SPECIMEN SOURCE: SIGNIFICANT CHANGE UP
WBC # BLD: 9.75 K/UL — SIGNIFICANT CHANGE UP (ref 3.8–10.5)
WBC # FLD AUTO: 9.75 K/UL — SIGNIFICANT CHANGE UP (ref 3.8–10.5)

## 2022-08-17 PROCEDURE — 99232 SBSQ HOSP IP/OBS MODERATE 35: CPT | Mod: GC

## 2022-08-17 PROCEDURE — 99232 SBSQ HOSP IP/OBS MODERATE 35: CPT

## 2022-08-17 RX ADMIN — AMPICILLIN SODIUM AND SULBACTAM SODIUM 200 GRAM(S): 250; 125 INJECTION, POWDER, FOR SUSPENSION INTRAMUSCULAR; INTRAVENOUS at 23:51

## 2022-08-17 RX ADMIN — Medication 600 MILLIGRAM(S): at 23:51

## 2022-08-17 RX ADMIN — Medication 650 MILLIGRAM(S): at 12:15

## 2022-08-17 RX ADMIN — AMPICILLIN SODIUM AND SULBACTAM SODIUM 200 GRAM(S): 250; 125 INJECTION, POWDER, FOR SUSPENSION INTRAMUSCULAR; INTRAVENOUS at 13:02

## 2022-08-17 RX ADMIN — AMPICILLIN SODIUM AND SULBACTAM SODIUM 200 GRAM(S): 250; 125 INJECTION, POWDER, FOR SUSPENSION INTRAMUSCULAR; INTRAVENOUS at 17:15

## 2022-08-17 RX ADMIN — Medication 650 MILLIGRAM(S): at 11:50

## 2022-08-17 RX ADMIN — AMPICILLIN SODIUM AND SULBACTAM SODIUM 200 GRAM(S): 250; 125 INJECTION, POWDER, FOR SUSPENSION INTRAMUSCULAR; INTRAVENOUS at 08:17

## 2022-08-17 RX ADMIN — Medication 600 MILLIGRAM(S): at 06:54

## 2022-08-17 RX ADMIN — ENOXAPARIN SODIUM 40 MILLIGRAM(S): 100 INJECTION SUBCUTANEOUS at 21:03

## 2022-08-17 RX ADMIN — Medication 600 MILLIGRAM(S): at 17:15

## 2022-08-17 RX ADMIN — Medication 600 MILLIGRAM(S): at 12:15

## 2022-08-17 RX ADMIN — Medication 650 MILLIGRAM(S): at 23:51

## 2022-08-17 RX ADMIN — AMPICILLIN SODIUM AND SULBACTAM SODIUM 200 GRAM(S): 250; 125 INJECTION, POWDER, FOR SUSPENSION INTRAMUSCULAR; INTRAVENOUS at 00:58

## 2022-08-17 RX ADMIN — Medication 600 MILLIGRAM(S): at 11:50

## 2022-08-17 RX ADMIN — Medication 650 MILLIGRAM(S): at 06:54

## 2022-08-17 RX ADMIN — Medication 650 MILLIGRAM(S): at 17:15

## 2022-08-17 RX ADMIN — Medication 30 MILLIGRAM(S): at 11:50

## 2022-08-17 NOTE — PROGRESS NOTE ADULT - SUBJECTIVE AND OBJECTIVE BOX
OB Progress Note    S: Patient seen and examined at bedside. Pain well controlled, tolerating regular diet, passing flatus, ambulating without difficulty, voiding spontaneously. Denies heavy vaginal bleeding, N/V, CP/SOB/lightheadedness/dizziness, headache, visual disturbances, epigastric pain.     O:   Vital Signs Last 24 Hrs  T(C): 36.9 (17 Aug 2022 00:35), Max: 37.2 (16 Aug 2022 05:35)  T(F): 98.4 (17 Aug 2022 00:35), Max: 98.9 (16 Aug 2022 05:35)  HR: 103 (17 Aug 2022 00:35) (86 - 108)  BP: 136/92 (17 Aug 2022 00:35) (122/84 - 144/90)  BP(mean): --  RR: 20 (17 Aug 2022 00:35) (16 - 20)  SpO2: 100% (17 Aug 2022 00:35) (98% - 100%)    Parameters below as of 17 Aug 2022 00:35  Patient On (Oxygen Delivery Method): room air        Labs:  Blood type: A Positive  Rubella IgG: RPR: Negative                          7.9<L>   12.95<H> >-----------< 171    ( 08-16 @ 06:54 )             24.7<L>                        7.6<L>   13.54<H> >-----------< 128<L>    ( 08-15 @ 08:33 )             23.5<L>                        7.5<L>   14.25<H> >-----------< 122<L>    ( 08-15 @ 00:41 )             23.2<L>                        8.4<L>   17.28<H> >-----------< 110<L>    ( 08-14 @ 13:22 )             25.1<L>                        8.0<L>   18.97<H> >-----------< 98<L>    ( 08-14 @ 06:42 )             24.6<L>    08-16-22 @ 06:55      139  |  105  |  9   ----------------------------<  90  4.5   |  21<L>  |  0.91    08-15-22 @ 08:32      136  |  102  |  11  ----------------------------<  86  3.9   |  24  |  0.88    08-15-22 @ 00:43      138  |  103  |  10  ----------------------------<  138<H>  3.8   |  24  |  0.83    08-14-22 @ 13:22      137  |  103  |  7   ----------------------------<  98  4.1   |  24  |  0.76    08-14-22 @ 06:42      135  |  103  |  10  ----------------------------<  89  4.0   |  21<L>  |  0.85        Ca    8.8      16 Aug 2022 06:55  Ca    7.5<L>      15 Aug 2022 08:32  Ca    7.2<L>      15 Aug 2022 00:43  Ca    7.7<L>      14 Aug 2022 13:22  Ca    8.2<L>      14 Aug 2022 06:42  Mg     4.9<H>     08-15  Mg     5.4<H>     08-14  Mg     4.9<H>     08-14    TPro  6.3  /  Alb  3.7  /  TBili  0.2  /  DBili  <0.1  /  AST  19  /  ALT  18  /  AlkPhos  72  08-16-22 @ 06:55  TPro  5.7<L>  /  Alb  3.0<L>  /  TBili  0.1<L>  /  DBili  x   /  AST  15  /  ALT  12  /  AlkPhos  73  08-15-22 @ 08:32  TPro  5.4<L>  /  Alb  2.9<L>  /  TBili  <0.1<L>  /  DBili  <0.1  /  AST  16  /  ALT  11  /  AlkPhos  72  08-15-22 @ 00:43  TPro  5.9<L>  /  Alb  3.3  /  TBili  0.2  /  DBili  <0.1  /  AST  20  /  ALT  15  /  AlkPhos  83  08-14-22 @ 13:22  TPro  5.5<L>  /  Alb  3.1<L>  /  TBili  0.3  /  DBili  <0.1  /  AST  21  /  ALT  14  /  AlkPhos  80  08-14-22 @ 06:42          PE:  General: NAD  Abdomen: nontender nondistended, Fundus firm  : No heavy vaginal bleeding  Extremities: Nontender, no pitting edema    < from: CT Abdomen and Pelvis w/ IV Cont (08.16.22 @ 18:07) >  *****PRELIMINARY REPORT******      ******PRELIMINARY REPORT******       ACC: [id number] EXAM:  CT ABDOMEN AND PELVIS IC                          PROCEDURE DATE:  08/16/2022    ******PRELIMINARY REPORT******      ******PRELIMINARY REPORT******           INTERPRETATION:  no CT evidence of thrombophlebitis as clinically   questioned. postprocedural changes noted. calcific density seen in right   lower quadrant, favored to be appendicolith.    follow up official report.        ******PRELIMINARY REPORT******      ******PRELIMINARY REPORT******        YAZ POSADA MD; Resident Radiologist  This document is a PRELIMINARY interpretation and is pending final   attending approval. Aug 16 2022  7:13PM    < end of copied text >   OB Progress Note    S: Patient seen and examined at bedside. Pain well controlled and tolerating regular diet. Denies heavy vaginal bleeding, N/V, CP/SOB/lightheadedness/dizziness, headache, visual disturbances, epigastric pain.     O:   Vital Signs Last 24 Hrs  T(C): 36.9 (17 Aug 2022 00:35), Max: 37.2 (16 Aug 2022 05:35)  T(F): 98.4 (17 Aug 2022 00:35), Max: 98.9 (16 Aug 2022 05:35)  HR: 103 (17 Aug 2022 00:35) (86 - 108)  BP: 136/92 (17 Aug 2022 00:35) (122/84 - 144/90)  BP(mean): --  RR: 20 (17 Aug 2022 00:35) (16 - 20)  SpO2: 100% (17 Aug 2022 00:35) (98% - 100%)    Parameters below as of 17 Aug 2022 00:35  Patient On (Oxygen Delivery Method): room air        Labs:  Blood type: A Positive  Rubella IgG: RPR: Negative                          7.9<L>   12.95<H> >-----------< 171    ( 08-16 @ 06:54 )             24.7<L>                        7.6<L>   13.54<H> >-----------< 128<L>    ( 08-15 @ 08:33 )             23.5<L>                        7.5<L>   14.25<H> >-----------< 122<L>    ( 08-15 @ 00:41 )             23.2<L>                        8.4<L>   17.28<H> >-----------< 110<L>    ( 08-14 @ 13:22 )             25.1<L>                        8.0<L>   18.97<H> >-----------< 98<L>    ( 08-14 @ 06:42 )             24.6<L>    08-16-22 @ 06:55      139  |  105  |  9   ----------------------------<  90  4.5   |  21<L>  |  0.91    08-15-22 @ 08:32      136  |  102  |  11  ----------------------------<  86  3.9   |  24  |  0.88    08-15-22 @ 00:43      138  |  103  |  10  ----------------------------<  138<H>  3.8   |  24  |  0.83    08-14-22 @ 13:22      137  |  103  |  7   ----------------------------<  98  4.1   |  24  |  0.76    08-14-22 @ 06:42      135  |  103  |  10  ----------------------------<  89  4.0   |  21<L>  |  0.85        Ca    8.8      16 Aug 2022 06:55  Ca    7.5<L>      15 Aug 2022 08:32  Ca    7.2<L>      15 Aug 2022 00:43  Ca    7.7<L>      14 Aug 2022 13:22  Ca    8.2<L>      14 Aug 2022 06:42  Mg     4.9<H>     08-15  Mg     5.4<H>     08-14  Mg     4.9<H>     08-14    TPro  6.3  /  Alb  3.7  /  TBili  0.2  /  DBili  <0.1  /  AST  19  /  ALT  18  /  AlkPhos  72  08-16-22 @ 06:55  TPro  5.7<L>  /  Alb  3.0<L>  /  TBili  0.1<L>  /  DBili  x   /  AST  15  /  ALT  12  /  AlkPhos  73  08-15-22 @ 08:32  TPro  5.4<L>  /  Alb  2.9<L>  /  TBili  <0.1<L>  /  DBili  <0.1  /  AST  16  /  ALT  11  /  AlkPhos  72  08-15-22 @ 00:43  TPro  5.9<L>  /  Alb  3.3  /  TBili  0.2  /  DBili  <0.1  /  AST  20  /  ALT  15  /  AlkPhos  83  08-14-22 @ 13:22  TPro  5.5<L>  /  Alb  3.1<L>  /  TBili  0.3  /  DBili  <0.1  /  AST  21  /  ALT  14  /  AlkPhos  80  08-14-22 @ 06:42          PE:  General: NAD  Abdomen: nontender nondistended, Fundus firm  : No heavy vaginal bleeding  Extremities: Nontender, no pitting edema    < from: CT Abdomen and Pelvis w/ IV Cont (08.16.22 @ 18:07) >  *****PRELIMINARY REPORT******      ******PRELIMINARY REPORT******       ACC: [id number] EXAM:  CT ABDOMEN AND PELVIS IC                          PROCEDURE DATE:  08/16/2022    ******PRELIMINARY REPORT******      ******PRELIMINARY REPORT******           INTERPRETATION:  no CT evidence of thrombophlebitis as clinically   questioned. postprocedural changes noted. calcific density seen in right   lower quadrant, favored to be appendicolith.    follow up official report.        ******PRELIMINARY REPORT******      ******PRELIMINARY REPORT******        YAZ POSADA MD; Resident Radiologist  This document is a PRELIMINARY interpretation and is pending final   attending approval. Aug 16 2022  7:13PM    < end of copied text >

## 2022-08-17 NOTE — PROGRESS NOTE ADULT - SUBJECTIVE AND OBJECTIVE BOX
26y old  Female who presents with a chief complaint of       Interval history:  afebrile, denies any symptoms.       Allergies:   No Known Allergies      Antimicrobials:  ampicillin/sulbactam  IVPB 3 Gram(s) IV Intermittent every 6 hours      REVIEW OF SYSTEMS:  No chest pain   No SOB  No N/V/D, no abdominal pain  No dysuria   No rash.       Vital Signs Last 24 Hrs  T(C): 37 (08-17-22 @ 17:00), Max: 37.2 (08-17-22 @ 08:47)  T(F): 98.6 (08-17-22 @ 17:00), Max: 99 (08-17-22 @ 08:47)  HR: 97 (08-17-22 @ 17:00) (96 - 103)  BP: 138/93 (08-17-22 @ 17:00) (117/78 - 139/93)  BP(mean): --  RR: 18 (08-17-22 @ 17:00) (18 - 20)  SpO2: 100% (08-17-22 @ 17:00) (98% - 100%)      PHYSICAL EXAM:  Pt in no acute distress, alert, awake.   No icterus  non distended abdomen  no edema LE   no phlebitis                             7.7    9.75  )-----------( 217      ( 17 Aug 2022 07:07 )             24.3   08-17    139  |  104  |  8   ----------------------------<  81  4.2   |  25  |  0.84    Ca    9.1      17 Aug 2022 07:12    TPro  6.5  /  Alb  3.7  /  TBili  0.2  /  DBili  x   /  AST  30  /  ALT  27  /  AlkPhos  70  08-17      LIVER FUNCTIONS - ( 17 Aug 2022 07:12 )  Alb: 3.7 g/dL / Pro: 6.5 g/dL / ALK PHOS: 70 U/L / ALT: 27 U/L / AST: 30 U/L / GGT: x               Culture - Urine (collected 16 Aug 2022 09:45)  Source: Clean Catch Clean Catch (Midstream)    Final Report (17 Aug 2022 12:32):    <10,000 CFU/mL Normal Urogenital Arielle    Culture - Blood (collected 16 Aug 2022 06:25)  Source: .Blood Blood-Peripheral  Preliminary Report (17 Aug 2022 12:01):    No growth to date.    Culture - Blood (collected 16 Aug 2022 06:10)  Source: .Blood Blood-Peripheral  Preliminary Report (17 Aug 2022 12:01):    No growth to date.        Radiology:  < from: CT Abdomen and Pelvis w/ IV Cont (08.16.22 @ 18:07) >  IMPRESSION:    The appearance of the urinary bladder suggests cystitis. Please correlate.    No ovarian thrombosis.

## 2022-08-17 NOTE — PROGRESS NOTE ADULT - ATTENDING COMMENTS
Patient reports feeling well without fevers, chills, nausea or vomiting. Tolerating diet, ambulating and voiding spontaneously.   Vitals reviewed, afebrile and normotensive   Gen: no acute distress   Abd: soft, nontender without fundal tenderness   Perineum: scant lochia rubra   Ext: no calf tenderness   Labs reviewed   A/P: Bacteremia, no signs/symptoms of sepsis  agree with resident assessment and plan    -continue Unasyn   -f/u AM labs   -f/u repeat blood and urine cultures  -pain control PRN   -continue Procardia XL   -DVT Ppx: Lovenox SQ     SUE Montez

## 2022-08-18 LAB
BASOPHILS # BLD AUTO: 0.07 K/UL — SIGNIFICANT CHANGE UP (ref 0–0.2)
BASOPHILS NFR BLD AUTO: 0.7 % — SIGNIFICANT CHANGE UP (ref 0–2)
CULTURE RESULTS: SIGNIFICANT CHANGE UP
CULTURE RESULTS: SIGNIFICANT CHANGE UP
EOSINOPHIL # BLD AUTO: 0.43 K/UL — SIGNIFICANT CHANGE UP (ref 0–0.5)
EOSINOPHIL NFR BLD AUTO: 4.1 % — SIGNIFICANT CHANGE UP (ref 0–6)
HCT VFR BLD CALC: 24.5 % — LOW (ref 34.5–45)
HGB BLD-MCNC: 7.7 G/DL — LOW (ref 11.5–15.5)
IMM GRANULOCYTES NFR BLD AUTO: 6.3 % — HIGH (ref 0–1.5)
LYMPHOCYTES # BLD AUTO: 19.8 % — SIGNIFICANT CHANGE UP (ref 13–44)
LYMPHOCYTES # BLD AUTO: 2.05 K/UL — SIGNIFICANT CHANGE UP (ref 1–3.3)
MCHC RBC-ENTMCNC: 27.3 PG — SIGNIFICANT CHANGE UP (ref 27–34)
MCHC RBC-ENTMCNC: 31.4 GM/DL — LOW (ref 32–36)
MCV RBC AUTO: 86.9 FL — SIGNIFICANT CHANGE UP (ref 80–100)
MONOCYTES # BLD AUTO: 0.7 K/UL — SIGNIFICANT CHANGE UP (ref 0–0.9)
MONOCYTES NFR BLD AUTO: 6.8 % — SIGNIFICANT CHANGE UP (ref 2–14)
NEUTROPHILS # BLD AUTO: 6.47 K/UL — SIGNIFICANT CHANGE UP (ref 1.8–7.4)
NEUTROPHILS NFR BLD AUTO: 62.3 % — SIGNIFICANT CHANGE UP (ref 43–77)
NRBC # BLD: 1 /100 WBCS — HIGH (ref 0–0)
ORGANISM # SPEC MICROSCOPIC CNT: SIGNIFICANT CHANGE UP
ORGANISM # SPEC MICROSCOPIC CNT: SIGNIFICANT CHANGE UP
PLATELET # BLD AUTO: 239 K/UL — SIGNIFICANT CHANGE UP (ref 150–400)
RBC # BLD: 2.82 M/UL — LOW (ref 3.8–5.2)
RBC # FLD: 15.6 % — HIGH (ref 10.3–14.5)
SPECIMEN SOURCE: SIGNIFICANT CHANGE UP
SPECIMEN SOURCE: SIGNIFICANT CHANGE UP
WBC # BLD: 10.37 K/UL — SIGNIFICANT CHANGE UP (ref 3.8–10.5)
WBC # FLD AUTO: 10.37 K/UL — SIGNIFICANT CHANGE UP (ref 3.8–10.5)

## 2022-08-18 PROCEDURE — 99232 SBSQ HOSP IP/OBS MODERATE 35: CPT

## 2022-08-18 RX ORDER — NIFEDIPINE 30 MG
30 TABLET, EXTENDED RELEASE 24 HR ORAL ONCE
Refills: 0 | Status: COMPLETED | OUTPATIENT
Start: 2022-08-18 | End: 2022-08-18

## 2022-08-18 RX ORDER — NIFEDIPINE 30 MG
60 TABLET, EXTENDED RELEASE 24 HR ORAL DAILY
Refills: 0 | Status: DISCONTINUED | OUTPATIENT
Start: 2022-08-19 | End: 2022-08-19

## 2022-08-18 RX ADMIN — Medication 600 MILLIGRAM(S): at 01:00

## 2022-08-18 RX ADMIN — Medication 650 MILLIGRAM(S): at 19:15

## 2022-08-18 RX ADMIN — Medication 600 MILLIGRAM(S): at 18:34

## 2022-08-18 RX ADMIN — AMPICILLIN SODIUM AND SULBACTAM SODIUM 200 GRAM(S): 250; 125 INJECTION, POWDER, FOR SUSPENSION INTRAMUSCULAR; INTRAVENOUS at 12:34

## 2022-08-18 RX ADMIN — Medication 600 MILLIGRAM(S): at 19:15

## 2022-08-18 RX ADMIN — Medication 1 TABLET(S): at 12:41

## 2022-08-18 RX ADMIN — Medication 650 MILLIGRAM(S): at 18:33

## 2022-08-18 RX ADMIN — Medication 600 MILLIGRAM(S): at 13:15

## 2022-08-18 RX ADMIN — Medication 600 MILLIGRAM(S): at 23:30

## 2022-08-18 RX ADMIN — AMPICILLIN SODIUM AND SULBACTAM SODIUM 200 GRAM(S): 250; 125 INJECTION, POWDER, FOR SUSPENSION INTRAMUSCULAR; INTRAVENOUS at 06:30

## 2022-08-18 RX ADMIN — Medication 650 MILLIGRAM(S): at 06:30

## 2022-08-18 RX ADMIN — Medication 650 MILLIGRAM(S): at 13:15

## 2022-08-18 RX ADMIN — Medication 600 MILLIGRAM(S): at 06:30

## 2022-08-18 RX ADMIN — Medication 30 MILLIGRAM(S): at 16:30

## 2022-08-18 RX ADMIN — ENOXAPARIN SODIUM 40 MILLIGRAM(S): 100 INJECTION SUBCUTANEOUS at 20:27

## 2022-08-18 RX ADMIN — Medication 650 MILLIGRAM(S): at 01:00

## 2022-08-18 RX ADMIN — Medication 30 MILLIGRAM(S): at 12:35

## 2022-08-18 RX ADMIN — Medication 600 MILLIGRAM(S): at 12:35

## 2022-08-18 RX ADMIN — Medication 1 TABLET(S): at 18:33

## 2022-08-18 RX ADMIN — Medication 650 MILLIGRAM(S): at 12:36

## 2022-08-18 NOTE — PROGRESS NOTE ADULT - SUBJECTIVE AND OBJECTIVE BOX
OB Progress Note    S: Patient seen and examined at bedside. Pain well controlled, tolerating regular diet, passing flatus, ambulating without difficulty, voiding spontaneously. Denies heavy vaginal bleeding, N/V, CP/SOB/lightheadedness/dizziness, headache, visual disturbances, epigastric pain.     O:   Vital Signs Last 24 Hrs  T(C): 37.1 (18 Aug 2022 00:04), Max: 37.2 (17 Aug 2022 08:47)  T(F): 98.7 (18 Aug 2022 00:04), Max: 99 (17 Aug 2022 08:47)  HR: 92 (18 Aug 2022 00:04) (92 - 100)  BP: 141/91 (18 Aug 2022 00:04) (117/78 - 141/91)  BP(mean): --  RR: 18 (18 Aug 2022 00:04) (18 - 20)  SpO2: 100% (18 Aug 2022 00:04) (98% - 100%)    Parameters below as of 17 Aug 2022 21:10  Patient On (Oxygen Delivery Method): room air        Labs:  Blood type: A Positive  Rubella IgG: RPR: Negative                          7.7<L>   9.75 >-----------< 217    ( 08-17 @ 07:07 )             24.3<L>                        7.9<L>   12.95<H> >-----------< 171    ( 08-16 @ 06:54 )             24.7<L>                        7.6<L>   13.54<H> >-----------< 128<L>    ( 08-15 @ 08:33 )             23.5<L>    08-17-22 @ 07:12      139  |  104  |  8   ----------------------------<  81  4.2   |  25  |  0.84    08-16-22 @ 06:55      139  |  105  |  9   ----------------------------<  90  4.5   |  21<L>  |  0.91    08-15-22 @ 08:32      136  |  102  |  11  ----------------------------<  86  3.9   |  24  |  0.88        Ca    9.1      17 Aug 2022 07:12  Ca    8.8      16 Aug 2022 06:55  Ca    7.5<L>      15 Aug 2022 08:32    TPro  6.5  /  Alb  3.7  /  TBili  0.2  /  DBili  x   /  AST  30  /  ALT  27  /  AlkPhos  70  08-17-22 @ 07:12  TPro  6.3  /  Alb  3.7  /  TBili  0.2  /  DBili  <0.1  /  AST  19  /  ALT  18  /  AlkPhos  72  08-16-22 @ 06:55  TPro  5.7<L>  /  Alb  3.0<L>  /  TBili  0.1<L>  /  DBili  x   /  AST  15  /  ALT  12  /  AlkPhos  73  08-15-22 @ 08:32          PE:  General: NAD  Abdomen: nontender nondistended, Fundus firm  : No heavy vaginal bleeding  Extremities: Nontender, no pitting edema   OB Progress Note    S: Patient seen and examined at bedside. Pt without pain and tolerating regular diet. Denies fevers, chills, nausea, vomiting, headache or chest pain.    O:   Vital Signs Last 24 Hrs  T(C): 37.1 (18 Aug 2022 00:04), Max: 37.2 (17 Aug 2022 08:47)  T(F): 98.7 (18 Aug 2022 00:04), Max: 99 (17 Aug 2022 08:47)  HR: 92 (18 Aug 2022 00:04) (92 - 100)  BP: 141/91 (18 Aug 2022 00:04) (117/78 - 141/91)  BP(mean): --  RR: 18 (18 Aug 2022 00:04) (18 - 20)  SpO2: 100% (18 Aug 2022 00:04) (98% - 100%)    Parameters below as of 17 Aug 2022 21:10  Patient On (Oxygen Delivery Method): room air        Labs:  Blood type: A Positive  Rubella IgG: RPR: Negative                          7.7<L>   9.75 >-----------< 217    ( 08-17 @ 07:07 )             24.3<L>                        7.9<L>   12.95<H> >-----------< 171    ( 08-16 @ 06:54 )             24.7<L>                        7.6<L>   13.54<H> >-----------< 128<L>    ( 08-15 @ 08:33 )             23.5<L>    08-17-22 @ 07:12      139  |  104  |  8   ----------------------------<  81  4.2   |  25  |  0.84    08-16-22 @ 06:55      139  |  105  |  9   ----------------------------<  90  4.5   |  21<L>  |  0.91    08-15-22 @ 08:32      136  |  102  |  11  ----------------------------<  86  3.9   |  24  |  0.88        Ca    9.1      17 Aug 2022 07:12  Ca    8.8      16 Aug 2022 06:55  Ca    7.5<L>      15 Aug 2022 08:32    TPro  6.5  /  Alb  3.7  /  TBili  0.2  /  DBili  x   /  AST  30  /  ALT  27  /  AlkPhos  70  08-17-22 @ 07:12  TPro  6.3  /  Alb  3.7  /  TBili  0.2  /  DBili  <0.1  /  AST  19  /  ALT  18  /  AlkPhos  72  08-16-22 @ 06:55  TPro  5.7<L>  /  Alb  3.0<L>  /  TBili  0.1<L>  /  DBili  x   /  AST  15  /  ALT  12  /  AlkPhos  73  08-15-22 @ 08:32          PE:  General: NAD  Abdomen: nontender nondistended, Fundus firm  : No heavy vaginal bleeding  Extremities: Nontender, no pitting edema

## 2022-08-18 NOTE — PROGRESS NOTE ADULT - ATTENDING COMMENTS
Pt looks well  No concerns  VSS AF  Fundus NT U-3  Ext; no cords, no edema  27yo  PPD#5 s/p VD for IUFD @30w6d c/b sPEC s/p Mg and concern for DIC. Readmitted for sepsis workup given + Blood cultures drawn  demonstrate growth of Gram Negative Rods, placenta cx with rare staph hominis. Pt remains afebrile and asymptomatic.    #Bacteremia   - GBS - Not detected.   - GC/Chlamydia- Not detected.   - TORCH labs: + IgG for CMV, IgM is negative   - Urine culture (): Negative   - Blood culture (): NGTD  - Blood culture (): GNR (p)-Fusobacterium-sensitivities are pending  - Maternal placenta cx (): Rare staph hominis (p)  - Fetal placental cx (): NGTD   - f/u BCx(), UCx()  - s/p Zosyn x1 ()  - c/w Unasyn (- )   - c/w Lovenox 40mg qd  - f/u AM CBC with diff  - ID following, appreciate recommendations. Recs: f/u repeat Cx, Unasyn, trend CBC with diff, CTAP to r/o thrombophlebitis was neg  - CT AP(): The appearance of the urinary bladder suggests cystitis. No ovarian thrombosis.    #sPEC  - s/p Mag for seizure ppx   - c/w Procardia 30XL  - monitor BPs    #Hx of DIC on recent admission.   - No concern for active DIC in the setting of well controlled bleeding. Pt will require outpatient evaluation from Hematology      Awaiting sensitivities of Fusobacterium-then switch to po abx today or tomorrow. Will need to clarify duration of po abx  Olga Latif MD

## 2022-08-18 NOTE — PROGRESS NOTE ADULT - SUBJECTIVE AND OBJECTIVE BOX
26yPatient is a 26y old  Female who presents with a chief complaint of       Interval history:  feels well, afebrile, no abdominal pain.       Allergies:   No Known Allergies      Antimicrobials:  amoxicillin  875 milliGRAM(s)/clavulanate 1 Tablet(s) Oral two times a day      REVIEW OF SYSTEMS:  No chest pain   No SOB  No N/V  No dysuria   No rash.       Vital Signs Last 24 Hrs  T(C): 37 (08-18-22 @ 16:44), Max: 37.1 (08-18-22 @ 00:04)  T(F): 98.6 (08-18-22 @ 16:44), Max: 98.7 (08-18-22 @ 00:04)  HR: 78 (08-18-22 @ 16:44) (78 - 100)  BP: 130/91 (08-18-22 @ 16:44) (130/91 - 143/99)  BP(mean): --  RR: 18 (08-18-22 @ 16:44) (18 - 18)  SpO2: 99% (08-18-22 @ 16:44) (98% - 100%)      PHYSICAL EXAM:  Pt in no acute distress, alert, awake.   No icterus  non distended abdomen  no edema LE   no phlebitis                             7.7    10.37 )-----------( 239      ( 18 Aug 2022 06:49 )             24.5   08-17    139  |  104  |  8   ----------------------------<  81  4.2   |  25  |  0.84    Ca    9.1      17 Aug 2022 07:12    TPro  6.5  /  Alb  3.7  /  TBili  0.2  /  DBili  x   /  AST  30  /  ALT  27  /  AlkPhos  70  08-17      LIVER FUNCTIONS - ( 17 Aug 2022 07:12 )  Alb: 3.7 g/dL / Pro: 6.5 g/dL / ALK PHOS: 70 U/L / ALT: 27 U/L / AST: 30 U/L / GGT: x               Culture - Urine (collected 16 Aug 2022 09:45)  Source: Clean Catch Clean Catch (Midstream)  Final Report (17 Aug 2022 12:32):    <10,000 CFU/mL Normal Urogenital Arielle    Culture - Blood (collected 16 Aug 2022 06:25)  Source: .Blood Blood-Peripheral  Preliminary Report (17 Aug 2022 12:01):    No growth to date.    Culture - Blood (collected 16 Aug 2022 06:10)  Source: .Blood Blood-Peripheral  Preliminary Report (17 Aug 2022 12:01):    No growth to date.

## 2022-08-19 ENCOUNTER — TRANSCRIPTION ENCOUNTER (OUTPATIENT)
Age: 27
End: 2022-08-19

## 2022-08-19 VITALS
SYSTOLIC BLOOD PRESSURE: 126 MMHG | DIASTOLIC BLOOD PRESSURE: 88 MMHG | OXYGEN SATURATION: 99 % | RESPIRATION RATE: 18 BRPM | HEART RATE: 98 BPM | TEMPERATURE: 98 F

## 2022-08-19 LAB
-  AMPICILLIN/SULBACTAM: SIGNIFICANT CHANGE UP
-  CEFAZOLIN: SIGNIFICANT CHANGE UP
-  CLINDAMYCIN: SIGNIFICANT CHANGE UP
-  ERYTHROMYCIN: SIGNIFICANT CHANGE UP
-  GENTAMICIN: SIGNIFICANT CHANGE UP
-  OXACILLIN: SIGNIFICANT CHANGE UP
-  RIFAMPIN: SIGNIFICANT CHANGE UP
-  TETRACYCLINE: SIGNIFICANT CHANGE UP
-  TRIMETHOPRIM/SULFAMETHOXAZOLE: SIGNIFICANT CHANGE UP
-  VANCOMYCIN: SIGNIFICANT CHANGE UP
CULTURE RESULTS: SIGNIFICANT CHANGE UP
CULTURE RESULTS: SIGNIFICANT CHANGE UP
METHOD TYPE: SIGNIFICANT CHANGE UP
ORGANISM # SPEC MICROSCOPIC CNT: SIGNIFICANT CHANGE UP
SPECIMEN SOURCE: SIGNIFICANT CHANGE UP
SPECIMEN SOURCE: SIGNIFICANT CHANGE UP

## 2022-08-19 PROCEDURE — 84295 ASSAY OF SERUM SODIUM: CPT

## 2022-08-19 PROCEDURE — 36415 COLL VENOUS BLD VENIPUNCTURE: CPT

## 2022-08-19 PROCEDURE — 86850 RBC ANTIBODY SCREEN: CPT

## 2022-08-19 PROCEDURE — 85018 HEMOGLOBIN: CPT

## 2022-08-19 PROCEDURE — 84132 ASSAY OF SERUM POTASSIUM: CPT

## 2022-08-19 PROCEDURE — 87086 URINE CULTURE/COLONY COUNT: CPT

## 2022-08-19 PROCEDURE — 85610 PROTHROMBIN TIME: CPT

## 2022-08-19 PROCEDURE — 81001 URINALYSIS AUTO W/SCOPE: CPT

## 2022-08-19 PROCEDURE — 82435 ASSAY OF BLOOD CHLORIDE: CPT

## 2022-08-19 PROCEDURE — 83605 ASSAY OF LACTIC ACID: CPT

## 2022-08-19 PROCEDURE — 82248 BILIRUBIN DIRECT: CPT

## 2022-08-19 PROCEDURE — U0003: CPT

## 2022-08-19 PROCEDURE — 85025 COMPLETE CBC W/AUTO DIFF WBC: CPT

## 2022-08-19 PROCEDURE — 87040 BLOOD CULTURE FOR BACTERIA: CPT

## 2022-08-19 PROCEDURE — 86901 BLOOD TYPING SEROLOGIC RH(D): CPT

## 2022-08-19 PROCEDURE — 80053 COMPREHEN METABOLIC PANEL: CPT

## 2022-08-19 PROCEDURE — 86900 BLOOD TYPING SEROLOGIC ABO: CPT

## 2022-08-19 PROCEDURE — 82803 BLOOD GASES ANY COMBINATION: CPT

## 2022-08-19 PROCEDURE — 85730 THROMBOPLASTIN TIME PARTIAL: CPT

## 2022-08-19 PROCEDURE — 84550 ASSAY OF BLOOD/URIC ACID: CPT

## 2022-08-19 PROCEDURE — 74177 CT ABD & PELVIS W/CONTRAST: CPT

## 2022-08-19 PROCEDURE — 83615 LACTATE (LD) (LDH) ENZYME: CPT

## 2022-08-19 PROCEDURE — 82947 ASSAY GLUCOSE BLOOD QUANT: CPT

## 2022-08-19 PROCEDURE — 82330 ASSAY OF CALCIUM: CPT

## 2022-08-19 PROCEDURE — U0005: CPT

## 2022-08-19 PROCEDURE — 85384 FIBRINOGEN ACTIVITY: CPT

## 2022-08-19 PROCEDURE — 99285 EMERGENCY DEPT VISIT HI MDM: CPT

## 2022-08-19 PROCEDURE — 85014 HEMATOCRIT: CPT

## 2022-08-19 PROCEDURE — 82247 BILIRUBIN TOTAL: CPT

## 2022-08-19 RX ORDER — IBUPROFEN 200 MG
1 TABLET ORAL
Qty: 0 | Refills: 0 | DISCHARGE
Start: 2022-08-19

## 2022-08-19 RX ADMIN — Medication 60 MILLIGRAM(S): at 12:57

## 2022-08-19 RX ADMIN — Medication 1 TABLET(S): at 05:38

## 2022-08-19 RX ADMIN — Medication 1 TABLET(S): at 12:57

## 2022-08-19 RX ADMIN — Medication 600 MILLIGRAM(S): at 12:57

## 2022-08-19 RX ADMIN — Medication 600 MILLIGRAM(S): at 00:30

## 2022-08-19 RX ADMIN — Medication 600 MILLIGRAM(S): at 05:49

## 2022-08-19 RX ADMIN — Medication 600 MILLIGRAM(S): at 13:40

## 2022-08-19 NOTE — PROGRESS NOTE ADULT - ASSESSMENT
25yo  PPD#5 s/p VD for IUFD @30w6d c/b sPEC s/p Mg and concern for DIC. Readmitted for sepsis workup given + Blood cultures drawn  demonstrate growth of Gram Negative Rods, placenta cx with rare staph hominis. Pt remains afebrile and asymptomatic.    #Bacteremia   - GBS - Not detected.   - GC/Chlamydia- Not detected.   - TORCH labs: + IgG for CMV, IgM is negative   - Urine culture (): Negative   - Blood culture (): NGTD  - Blood culture (): GNR (p)  - Maternal placenta cx (): Rare staph hominis (p)  - Fetal placental cx (): NGTD  - f/u BCx(), UCx()  - s/p Zosyn x1 ()  - c/w Unasyn (- )   - c/w Lovenox 40mg qd  - f/u AM CBC with diff  - ID following, appreciate recommendations. Recs: f/u repeat Cx, Unasyn, trend CBC with diff, CTAP to r/o thrombophlebitis   - CT AP(): The appearance of the urinary bladder suggests cystitis. No ovarian thrombosis.    #sPEC  - s/p Mag for seizure ppx   - c/w Procardia 30XL  - monitor BPs    #Hx of DIC on recent admission.   - No concern for active DIC in the setting of well controlled bleeding. Pt will require outpatient evaluation from Hematology      Dixie Walsh, PGY-3
27yo  PPD#6 s/p VD for IUFD @30w6d c/b sPEC s/p Mg and concern for DIC. Readmitted for sepsis workup given + Blood cultures drawn  demonstrate growth of Gram Negative Rods, placenta cx with rare staph hominis. Pt remains afebrile and asymptomatic.    #Bacteremia   - GBS - Not detected.   - GC/Chlamydia- Not detected.   - TORCH labs: + IgG for CMV, IgM is negative   - Urine culture (): Negative   - Blood culture (): NGTD  - Blood culture (): GNR (p)  - Maternal placenta cx (): Rare staph hominis (p)  - Fetal placental cx (): NGTD  - f/u BCx(), UCx()  - s/p Zosyn x1 ()  - s/p Unasyn (-)   - c/w Augmentin 875 mg BID to complete 14 days until 22  - c/w Lovenox 40mg qd  - f/u AM CBC with diff  - ID following, appreciate recommendations. Recs: f/u repeat Cx, Unasyn, trend CBC with diff, CTAP to r/o thrombophlebitis   - CT AP(): The appearance of the urinary bladder suggests cystitis. No ovarian thrombosis.    #sPEC  - s/p Mag for seizure ppx   - c/w Procardia 60XL  - monitor BPs    #Hx of DIC on recent admission.   - No concern for active DIC in the setting of well controlled bleeding. Pt will require outpatient evaluation from Hematology      Dixie Walsh, PGY-3
25yo  PPD#3 s/p VD for IUFD @30w6d c/b sPEC s/p Mg and concern for DIC. Pt asked to return to the ED after blood cultures drawn on admission demonstrate growth of Gram Negative Rods.    Overall sepsis, leucocytosis, tachycardia on presentation, fusobacterium bacteremia.  resolved leucocytosis and sepsis.      PLAN:  c/w unasyn 3 gm iv q6h   repeat blood cx NTD   U/A with some pyuria, pt with no symptoms   follow up urine cx, negative   trend cbc for leucocytosis, resolved   CT abd/pelvis with no thrombophlebitis   can switch to po augmentin 875 mg BID to complete 14 days until 22    Plan discussed with Ob.     Will sign off, please call with questions.   Ivon La  Please contact through MS Teams   If no response or past 5 pm/weekend call [phone]. 
27yo  PPD#3 s/p VD for IUFD @30w6d c/b sPEC s/p Mg and concern for DIC. Pt asked to return to the ED after blood cultures drawn on admission demonstrate growth of Gram Negative Rods.    Overall sepsis, leucocytosis, tachycardia on presentation, fusobacterium bacteremia.  resolved leucocytosis and sepsis.      PLAN:  c/w unasyn 3 gm iv q6h   repeat blood cx NTD   U/A with some pyuria, pt with no symptoms   follow up urine cx, negative   trend cbc for leucocytosis, resolved   CT abd/pelvis with no thrombophlebitis   can switch to po in 24-48 hrs based on cx results.       Ivon La  Please contact through MS Teams   If no response or past 5 pm/weekend call [phone]. 
25yo  PPD#4 s/p VD for IUFD @30w6d c/b sPEC s/p Mg and concern for DIC. Readmitted for sepsis workup given + Blood cultures drawn  demonstrate growth of Gram Negative Rods, placenta cx with rare staph hominis c/f septic thrombophlebitis. Pt remains afebrile and asymptomatic     #Bacteremia   - GBS - Not detected.   - GC/Chlamydia- Not detected.   - TORCH labs: + IgG for CMV, IgM is negative   - Urine culture (): Negative   - Blood culture (): NGTD  - Blood culture (): GNR (p)  - Maternal placenta cx (): Rare staph hominis (p)  - Fetal placental cx (): NGTD  - f/u BCx(), UCx()  - s/p Zosyn x1 ()  - c/w Unasyn (- )   - c/w Lovenox 40mg qd  - f/u AM CBC with diff  - ID following, appreciate recommendations. Recs: f/u repeat Cx, Unasyn, trend CBC with diff, CTAP to r/o thrombophlebitis   - CT AP(): No evidence of thrombophlebitis at this time, f/u final read     #sPEC  - s/p Mag for seizure ppx   - c/w Procardia 30XL  - f/u AM HELLP labs  - monitor BPs    #Hx of DIC on recent admission.   - No concern for active DIC in the setting of well controlled bleeding. Pt will require outpatient evaluation from Hematology      Dixie Walsh, PGY-3

## 2022-08-19 NOTE — DISCHARGE NOTE OB - NS MD DC FALL RISK RISK
For information on Fall & Injury Prevention, visit: [email or web address] OR  [email or web address] OR  https://www.cdc.gov/steadi/patient.html

## 2022-08-19 NOTE — DISCHARGE NOTE OB - CARE PLAN
Principal Discharge DX:	Bacteremia  Assessment and plan of treatment:	Please continue lovenox. take your blood pressure twice a day and call if blood pressure is above 160/100, unresolved headache, blurry vision, right upper belly pain. Call if you have fever, or heavy vaginal bleeding. Please follow up in the office next week   1

## 2022-08-19 NOTE — DISCHARGE NOTE OB - HOSPITAL COURSE
Patient readmitted after having blood cultures return positive. Received IV antibiotics and transitioned to PO. No fevers and labs stable. Discharged home on HD5

## 2022-08-19 NOTE — PROGRESS NOTE ADULT - SUBJECTIVE AND OBJECTIVE BOX
OB Progress Note    S: Patient seen and examined at bedside. Pt without pain and tolerating regular diet. Denies fevers, chills, nausea, vomiting, headache or chest pain.    O:   Vital Signs Last 24 Hrs  T(C): 36.6 (19 Aug 2022 00:02), Max: 37 (18 Aug 2022 09:09)  T(F): 97.8 (19 Aug 2022 00:02), Max: 98.6 (18 Aug 2022 09:09)  HR: 89 (19 Aug 2022 00:02) (78 - 98)  BP: 134/90 (19 Aug 2022 00:02) (130/89 - 143/99)  BP(mean): --  RR: 18 (19 Aug 2022 00:02) (18 - 18)  SpO2: 100% (19 Aug 2022 00:02) (98% - 100%)    Parameters below as of 19 Aug 2022 00:02  Patient On (Oxygen Delivery Method): room air        Labs:  Blood type: A Positive  Rubella IgG: RPR: Negative                          7.7<L>   10.37 >-----------< 239    ( 08-18 @ 06:49 )             24.5<L>                        7.7<L>   9.75 >-----------< 217    ( 08-17 @ 07:07 )             24.3<L>                        7.9<L>   12.95<H> >-----------< 171    ( 08-16 @ 06:54 )             24.7<L>    08-17-22 @ 07:12      139  |  104  |  8   ----------------------------<  81  4.2   |  25  |  0.84    08-16-22 @ 06:55      139  |  105  |  9   ----------------------------<  90  4.5   |  21<L>  |  0.91        Ca    9.1      17 Aug 2022 07:12  Ca    8.8      16 Aug 2022 06:55    TPro  6.5  /  Alb  3.7  /  TBili  0.2  /  DBili  x   /  AST  30  /  ALT  27  /  AlkPhos  70  08-17-22 @ 07:12  TPro  6.3  /  Alb  3.7  /  TBili  0.2  /  DBili  <0.1  /  AST  19  /  ALT  18  /  AlkPhos  72  08-16-22 @ 06:55          PE:  General: NAD  Abdomen: nontender nondistended, Fundus firm  : No heavy vaginal bleeding  Extremities: Nontender, no pitting edema

## 2022-08-19 NOTE — DISCHARGE NOTE OB - CARE PROVIDER_API CALL
Olga Latif  OBSTETRICS AND GYNECOLOGY  [address], [address]  [place] [address]  Phone: [phone]  Fax: [phone]  Follow Up Time:

## 2022-08-19 NOTE — DISCHARGE NOTE OB - MEDICATION SUMMARY - MEDICATIONS TO TAKE
I will START or STAY ON the medications listed below when I get home from the hospital:    ibuprofen 600 mg oral tablet  -- 1 tab(s) by mouth every 6 hours  -- Indication: For pain    amoxicillin-clavulanate 875 mg-125 mg oral tablet  -- 1 tab(s) by mouth 2 times a day  -- Indication: For infection

## 2022-08-19 NOTE — DISCHARGE NOTE OB - PLAN OF CARE
Please continue lovenox. take your blood pressure twice a day and call if blood pressure is above 160/100, unresolved headache, blurry vision, right upper belly pain. Call if you have fever, or heavy vaginal bleeding. Please follow up in the office next week

## 2022-08-19 NOTE — DISCHARGE NOTE OB - PATIENT PORTAL LINK FT
You can access the FollowMyHealth Patient Portal offered by [facility] by registering at the following website: [email or web address]. By joining [facility] FollowMyHealth portal, you will also be able to view your health information using other applications (apps) compatible with our system.

## 2022-08-19 NOTE — PROGRESS NOTE ADULT - SUBJECTIVE AND OBJECTIVE BOX
OB Attending Note    S: Patient doing well. Minimal lochia. Pain controlled. emotionally doing well. declined any antidepressent. no fever, chills, sweats. Feels well.     O: Vital Signs Last 24 Hrs  T(C): 36.8 (19 Aug 2022 09:31), Max: 37.1 (19 Aug 2022 05:37)  T(F): 98.3 (19 Aug 2022 09:31), Max: 98.8 (19 Aug 2022 05:37)  HR: 98 (19 Aug 2022 09:31) (78 - 100)  BP: 126/88 (19 Aug 2022 09:31) (126/88 - 143/99)  BP(mean): --  RR: 18 (19 Aug 2022 09:31) (18 - 18)  SpO2: 99% (19 Aug 2022 09:31) (98% - 100%)    Parameters below as of 19 Aug 2022 00:02  Patient On (Oxygen Delivery Method): room air        Gen: NAD  Abd: soft, NT, ND, fundus firm below umbilicus  Lochia: min  Perineum healing well  Ext: no tenderness    Labs:                        7.7    10.37 )-----------( 239      ( 18 Aug 2022 06:49 )             24.5       A: 26y s/p  for IUFD complicated by severe PEC and sepsis with bacteremia    Plan: cont PP care  OOB/ambulation  Pain control  Continue augmenting for 12 days per ID  Continue lovenox. discussed with the patient to do 6 weeks of lovenox which the patient is amenable for.   Continue procardia 60xl daily. to take BP twice daily. To call if above 160/100 or headache, blurry vision, RUQ pain.     Katja Hernandez DO

## 2022-08-19 NOTE — DISCHARGE NOTE OB - ADDITIONAL INSTRUCTIONS
Follow up with MD in 1 week. Take BP twice a day. If BP is above 160\100, take medication and call MD. If BP is below 110/60, do NOT take medication and call MD for further instructions.

## 2022-08-21 LAB
CULTURE RESULTS: SIGNIFICANT CHANGE UP
CULTURE RESULTS: SIGNIFICANT CHANGE UP
SPECIMEN SOURCE: SIGNIFICANT CHANGE UP
SPECIMEN SOURCE: SIGNIFICANT CHANGE UP

## 2022-08-22 LAB — SURGICAL PATHOLOGY STUDY: SIGNIFICANT CHANGE UP

## 2022-10-04 ENCOUNTER — APPOINTMENT (OUTPATIENT)
Dept: HEPATOLOGY | Facility: CLINIC | Age: 27
End: 2022-10-04

## 2022-10-06 ENCOUNTER — NON-APPOINTMENT (OUTPATIENT)
Age: 27
End: 2022-10-06

## 2022-10-06 ENCOUNTER — APPOINTMENT (OUTPATIENT)
Dept: CARDIOLOGY | Facility: CLINIC | Age: 27
End: 2022-10-06

## 2022-10-06 VITALS
HEART RATE: 108 BPM | OXYGEN SATURATION: 100 % | WEIGHT: 198 LBS | HEIGHT: 66.75 IN | DIASTOLIC BLOOD PRESSURE: 84 MMHG | BODY MASS INDEX: 31.08 KG/M2 | SYSTOLIC BLOOD PRESSURE: 127 MMHG

## 2022-10-06 DIAGNOSIS — Z82.49 FAMILY HISTORY OF ISCHEMIC HEART DISEASE AND OTHER DISEASES OF THE CIRCULATORY SYSTEM: ICD-10-CM

## 2022-10-06 DIAGNOSIS — I10 ESSENTIAL (PRIMARY) HYPERTENSION: ICD-10-CM

## 2022-10-06 DIAGNOSIS — Z00.00 ENCOUNTER FOR GENERAL ADULT MEDICAL EXAMINATION W/OUT ABNORMAL FINDINGS: ICD-10-CM

## 2022-10-06 DIAGNOSIS — Z87.898 PERSONAL HISTORY OF OTHER SPECIFIED CONDITIONS: ICD-10-CM

## 2022-10-06 PROCEDURE — 99203 OFFICE O/P NEW LOW 30 MIN: CPT | Mod: 25

## 2022-10-06 PROCEDURE — 93000 ELECTROCARDIOGRAM COMPLETE: CPT

## 2022-10-10 PROBLEM — I10 ESSENTIAL HYPERTENSION: Status: ACTIVE | Noted: 2022-10-10

## 2022-10-10 NOTE — PHYSICAL EXAM
[Tachycardia] : tachycardic [Rhythm Regular] : regular [Normal S1] : normal S1 [Normal S2] : normal S2 [Normal] : moves all extremities, no focal deficits, normal speech

## 2022-10-10 NOTE — HISTORY OF PRESENT ILLNESS
[FreeTextEntry1] : Ms. Patterson, is a 27 year old [place] female, [place] that [facility] to [facility] for a cardiovascular evaluation and assessment.\par \par She carries a history of chronic active viral hepatitis B (unknown etiology) , and a recent intrauterine fetal demise on August 13, 2022 at almost 31 weeks gestation.\par Patient was "trying" for a naturally conceived baby since 2017.\par \par Recent pregnancy was complicated by cervical shortening-> s/p cervical cerclage on June 7, 2022 with oral Progesterone support. \par \par Patient developed  preeclampsia at 31 weeks with elevated blood pressures and bacteremia from a culture done on the day of delivery. \par \par Blood pressure today: 127/84-> well controlled on Procardia\par EKG- Sinus tachycardia @ 106 bpm \par \par Currently denies any issues with shortness of breath, chest discomfort or palpitations. \par \par \par

## 2022-10-10 NOTE — ASSESSMENT
[FreeTextEntry1] : Ms. Patterson, is a 27 year old [place] female, [place] that presents to the [facility] for a cardiovascular evaluation and assessment.\par \par She carries a history of chronic active viral hepatitis B (unknown etiology) , and a recent intrauterine fetal demise on August 13, 2022 at almost 31 weeks gestation.\par Patient developed  preeclampsia at 31 weeks with elevated blood pressures and bacteremia from a culture done on the day of delivery. \par \par # Hypertension\par    Blood pressure today: 127/ 84\par    Current pressures well controlled on Procardia 60 mg QD\par    Continue on current medical regimen\par    Requested BP log for one week to review. Advised patient to notify if BP prior to medication drops below \par    120 systolic consistently.\par \par #Adverse Cardiovascular Risk Factors  \par  Personal history of prior preeclampsia, hypertension, viral Hepatitis B \par  Family history of hypertension\par \par Recommending a baseline cardiovascular evaluation to assess risk\par \par \par Echocardiogram to assess cardiac structure and function\par Exercise stress testing to assess functional status\par Full blood work panel:  CBC,CMP, Hgb A1C, TSH, Lipid profile\par \par \par Follow up post testing\par \par

## 2022-10-11 NOTE — ED ADULT TRIAGE NOTE - ESI TRIAGE ACUITY LEVEL, MLM
3 Island Pedicle Flap Text: The defect edges were debeveled with a #15 scalpel blade.  Given the location of the defect, shape of the defect and the proximity to free margins an island pedicle advancement flap was deemed most appropriate.  Using a sterile surgical marker, an appropriate advancement flap was drawn incorporating the defect, outlining the appropriate donor tissue and placing the expected incisions within the relaxed skin tension lines where possible.    The area thus outlined was incised deep to adipose tissue with a #15 scalpel blade.  The skin margins were undermined to an appropriate distance in all directions around the primary defect and laterally outward around the island pedicle utilizing iris scissors.  There was minimal undermining beneath the pedicle flap.

## 2022-10-12 ENCOUNTER — NON-APPOINTMENT (OUTPATIENT)
Age: 27
End: 2022-10-12

## 2022-10-12 LAB
ALBUMIN SERPL ELPH-MCNC: 4.6 G/DL
ALP BLD-CCNC: 62 U/L
ALT SERPL-CCNC: 44 U/L
ANION GAP SERPL CALC-SCNC: 15 MMOL/L
AST SERPL-CCNC: 29 U/L
BASOPHILS # BLD AUTO: 0.05 K/UL
BASOPHILS NFR BLD AUTO: 0.9 %
BILIRUB SERPL-MCNC: <0.2 MG/DL
BUN SERPL-MCNC: 10 MG/DL
CALCIUM SERPL-MCNC: 9.8 MG/DL
CHLORIDE SERPL-SCNC: 101 MMOL/L
CHOLEST SERPL-MCNC: 253 MG/DL
CO2 SERPL-SCNC: 23 MMOL/L
CREAT SERPL-MCNC: 0.74 MG/DL
EGFR: 112 ML/MIN/1.73M2
EOSINOPHIL # BLD AUTO: 0.19 K/UL
EOSINOPHIL NFR BLD AUTO: 3.3 %
ESTIMATED AVERAGE GLUCOSE: 94 MG/DL
GLUCOSE SERPL-MCNC: 81 MG/DL
HBA1C MFR BLD HPLC: 4.9 %
HCT VFR BLD CALC: 40.2 %
HDLC SERPL-MCNC: 45 MG/DL
HGB BLD-MCNC: 12.4 G/DL
IMM GRANULOCYTES NFR BLD AUTO: 0.2 %
LDLC SERPL CALC-MCNC: 142 MG/DL
LYMPHOCYTES # BLD AUTO: 2.24 K/UL
LYMPHOCYTES NFR BLD AUTO: 39.2 %
MAN DIFF?: NORMAL
MCHC RBC-ENTMCNC: 26.3 PG
MCHC RBC-ENTMCNC: 30.8 GM/DL
MCV RBC AUTO: 85.4 FL
MONOCYTES # BLD AUTO: 0.52 K/UL
MONOCYTES NFR BLD AUTO: 9.1 %
NEUTROPHILS # BLD AUTO: 2.71 K/UL
NEUTROPHILS NFR BLD AUTO: 47.3 %
NONHDLC SERPL-MCNC: 209 MG/DL
PLATELET # BLD AUTO: 370 K/UL
POTASSIUM SERPL-SCNC: 4.1 MMOL/L
PROT SERPL-MCNC: 7.6 G/DL
RBC # BLD: 4.71 M/UL
RBC # FLD: 15.7 %
SODIUM SERPL-SCNC: 139 MMOL/L
TRIGL SERPL-MCNC: 331 MG/DL
TSH SERPL-ACNC: 0.45 UIU/ML
WBC # FLD AUTO: 5.72 K/UL

## 2022-10-31 ENCOUNTER — APPOINTMENT (OUTPATIENT)
Dept: MATERNAL FETAL MEDICINE | Facility: CLINIC | Age: 27
End: 2022-10-31

## 2022-10-31 ENCOUNTER — ASOB RESULT (OUTPATIENT)
Age: 27
End: 2022-10-31

## 2022-10-31 PROCEDURE — 99214 OFFICE O/P EST MOD 30 MIN: CPT | Mod: 95

## 2022-10-31 PROCEDURE — 99204 OFFICE O/P NEW MOD 45 MIN: CPT | Mod: 95

## 2022-11-23 ENCOUNTER — LABORATORY RESULT (OUTPATIENT)
Age: 27
End: 2022-11-23

## 2022-12-13 ENCOUNTER — APPOINTMENT (OUTPATIENT)
Dept: CV DIAGNOSITCS | Facility: HOSPITAL | Age: 27
End: 2022-12-13

## 2022-12-13 ENCOUNTER — OUTPATIENT (OUTPATIENT)
Dept: OUTPATIENT SERVICES | Facility: HOSPITAL | Age: 27
LOS: 1 days | End: 2022-12-13
Payer: COMMERCIAL

## 2022-12-13 ENCOUNTER — APPOINTMENT (OUTPATIENT)
Dept: CV DIAGNOSTICS | Facility: HOSPITAL | Age: 27
End: 2022-12-13

## 2022-12-13 DIAGNOSIS — B18.1 CHRONIC VIRAL HEPATITIS B WITHOUT DELTA-AGENT: ICD-10-CM

## 2022-12-13 PROCEDURE — 93306 TTE W/DOPPLER COMPLETE: CPT | Mod: 26

## 2022-12-13 PROCEDURE — 93306 TTE W/DOPPLER COMPLETE: CPT

## 2022-12-16 ENCOUNTER — APPOINTMENT (OUTPATIENT)
Dept: HEPATOLOGY | Facility: CLINIC | Age: 27
End: 2022-12-16

## 2022-12-16 VITALS
HEIGHT: 66.75 IN | RESPIRATION RATE: 16 BRPM | BODY MASS INDEX: 31.39 KG/M2 | DIASTOLIC BLOOD PRESSURE: 83 MMHG | TEMPERATURE: 97.8 F | OXYGEN SATURATION: 98 % | SYSTOLIC BLOOD PRESSURE: 120 MMHG | HEART RATE: 82 BPM | WEIGHT: 200 LBS

## 2022-12-16 DIAGNOSIS — O09.93 SUPERVISION OF HIGH RISK PREGNANCY, UNSPECIFIED, THIRD TRIMESTER: ICD-10-CM

## 2022-12-16 DIAGNOSIS — O09.91 SUPERVISION OF HIGH RISK PREGNANCY, UNSPECIFIED, FIRST TRIMESTER: ICD-10-CM

## 2022-12-16 DIAGNOSIS — E66.9 OBESITY, UNSPECIFIED: ICD-10-CM

## 2022-12-16 DIAGNOSIS — Z87.59 PERSONAL HISTORY OF OTHER COMPLICATIONS OF PREGNANCY, CHILDBIRTH AND THE PUERPERIUM: ICD-10-CM

## 2022-12-16 DIAGNOSIS — Z87.51 PERSONAL HISTORY OF PRE-TERM LABOR: ICD-10-CM

## 2022-12-16 PROCEDURE — 99214 OFFICE O/P EST MOD 30 MIN: CPT

## 2022-12-16 NOTE — CONSULT LETTER
[Dear  ___] : Dear  [unfilled], [Please see my note below.] : Please see my note below. [Consult Closing:] : Thank you very much for allowing me to participate in the care of this patient.  If you have any questions, please do not hesitate to contact me. [Courtesy Letter:] : I had the pleasure of seeing your patient, [unfilled], in my office today. [FreeTextEntry2] : Dr. Kiersten Latif\par [facility] [place] [FreeTextEntry3] : Sincerely,\par \par Erich Dick M.D., Ph.D.\par Director, [facility], [facility]\par Transplant Hepatology, [facility]\par  of Medicine\par [employer] [facility] [employer] [facility]\par [address]\[place] [address]\par Tel: [phone]\par Fax: [phone]\par Cell: [phone]\par E-mail: [email or web address]

## 2022-12-16 NOTE — ASSESSMENT
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 She does not have HIV, HCV, or HDV coinfection. \par \par We previously discussed the natural history of hepatitis B virus (HBV) infection and modes of transmission. We discussed that based on her labs, it is not possible to ascertain how she acquired the infection in the past. Possibilities include  transmission from her mother, childhood exposure such as from vaccinations if done using a shared needle, or adult exposure through sexual activity. I previously advised her to discuss her HBV diagnosis with her immediate family as her , parents, and siblings should all be tested for HBV so that they can undergo surveillance or treatment if they have chronic HBV or be vaccinated if non-immune.\par \par We discussed the staging of chronic HBV. She has not undergone FibroScan yet but we will defer that for now given her pregnancy within the first trimester and plan for staging either during the second trimester if there are any liver chemistry abnormalities noted today or, more likely, postpartum. Her most recent labs (on 10/6/22, prior to this most recent pregnancy and after recovery from her prior IUFD and severe preeclampsia episode in 2022) did show mild ALT elevation of 44 IU/mL, new from prior. HBV PCR was not re-checked, but last was 1,801 IU/mL (22) during her third trimester of the prior pregnancy.\par \par Abdominal ultrasound was ordered today as she is due for q6 month surveillance to rule out hepatocellular carcinoma (HCC). Repeat labs ordered today to trend her liver chemistries and HBV DNA by PCR (quantitative).\par \par We discussed potential maternal risks of progressive liver fibrosis, cirrhosis, and hepatocellular carcinoma (HCC). We discussed that antiviral therapy and suppression of viremia reduces maternal risks of cirrhosis and HCC, and that therapy may be indicated if there are signs of ongoing liver inflammation/injury. She will need lifelong monitoring of her liver tests (typically done every 3-6 months unless during high risk periods for hepatitis flare, as discussed below). She should also undergo lifelong surveillance for HCC especially after age 40 with ultrasound and AFP (with latter only when non-pregnant) every 6 months.\par \par We discussed chronic HBV infection in pregnancy, and that while there is no evidence to suggest increased risk of adverse pregnancy outcomes in women with chronic HBV infection who do not have advanced liver disease, a major concern of untreated HBV infection is the risk of mother-to-child transmission. The most important risk factors for transmission are HBeAg-positivity (which she does not have based on her prior labs) and high HBV viral load (i.e., high HBV DNA by PCR).\par \par We discussed that every  of a mother who is HBV-positive should receive immunoprophylaxis with HBV vaccination and hepatitis B immune globulin (HBIG), injected into contralateral feet and both received within <12 hours after delivery, to decrease the risk of the infant developing chronic HBV. This should occur regardless of whether the mother is being treated with antiviral therapy. Her  should then complete the HBV vaccination series with his pediatrician. We discussed that vaginal delivery is acceptable, so long as the  receives immunoprophylaxis as above, as there is insufficient evidence that  section further decreases risk of mother-to-child transmission if infant immunoprophylaxis is given. Infants who receive this immunoprophylaxis can also safely be  unless nipples are cracked or bleeding.\par \par In addition, we discussed that antiviral therapy is recommended for pregnant women starting in the third trimester (typically at 32 weeks gestational age) if the HBV viral load is >200,000 IU/mL to decrease the risk of mother-to-child transmission. We will re-check her HBV DNA by PCR (quantitative) today and during this pregnancy periodically, in case she has rising viremia that could necessitate antiviral therapy initiation antepartum.\par \par We discussed that tenofovir disoproxil fumarate (TDF) is the preferred choice if antiviral therapy is used during pregnancy due to antiviral potency, very low risk for resistance, clinical trial evidence confirming decrease risk of mother-to-child transmission with TDF, and no difference in rates of prematurity, congenital malformations, or Apgar scores with TDF. Additional clinical studies also have found no increase in adverse events among TDF exposed and unexposed infants. We discussed that although antiviral drug labels do not recommend breastfeeding while using the medications, clinical studies support the safety of TDF during breastfeeding.\par \par If she ends up requiring TDF during pregnancy in the third trimester due to increased viremia (as above), then her labs and FibroScan postpartum will help us to determine the need for continued antiviral therapy for her own liver health. If the labs and FibroScan are reassuring, then her TDF will likely be discontinued 4-6 weeks postpartum. We discussed that after TDF discontinuation, there is an increased risk (approximately 25%) of hepatitis flare postpartum for up to 6 months that would require monthly monitoring of her liver tests. After 6 months postpartum, if her liver tests all remain reassuring, then her laboratory checks can be spaced to every 3-6 months long-term.\par \par In brief summary, her HBV viral load will be monitored today and periodically during this pregnancy to assess for need for TDF either for maternal health or during the third trimester of pregnancy to decrease risk of maternal-to-child transmission. Antiviral therapy will be deferred if her liver chemistries remain normal and her viral load remains <200,000 IU/mL. Regardless of whether she receives antiviral therapy, vaginal delivery is not contraindicated and her  should receive immunoprophylaxis with HBV vaccination and hepatitis B immune globulin (HBIG), injected into contralateral feet and both received within <12 hours after delivery. Her  should then complete the remainder of the HBV vaccination series with his pediatrician. Breastfeeding is safe unless nipples are cracked or bleeding (should "pump and dump" until they heal).\par \par She is anticipated to have close obstetrics and MFM follow-up during this pregnancy due to her recent history of severe preeclampsia,  labor, and IUFD.\par \par She is HAV immune.\par \par Next follow-up: 3 months

## 2022-12-16 NOTE — HISTORY OF PRESENT ILLNESS
[Tattoo] : tattoo(s) [Body Piercing] : body piercing [IV Drug Use] : no IV drug use [Hemodialysis] : no hemodialysis [Transfusion before 1992] : no transfusion before 1992 [Transplant before 1992] : no transplant before 1992 [Incarceration] : no incarceration [Alcohol Abuse] : no alcohol abuse [de-identified] : Ms. 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 She does not have HIV, HCV, or HDV coinfection. \par \par PCP: Dr. Varela (in [place])\par OB: Dr. Kiersten Latif ([facility] [place])\par \par She was last seen by me on 22 during her last pregnancy and shortly prior to her unfortunately having IUFD and subsequent  labor, severe preeclampsia with MILAD and DIC, and readmission due to bacteremia. She has recovered. She returned to work on 22.\par \par Today, she tells me that she is pregnant again. She just found out this week. LMP 10/25/22. She is having some morning sickness with nausea and vomiting once. She has been taking her nifedipine ER every other day based on her home BPs. She tends to get a headache when she takes it and wonders whether her dose needs to be lowered. She is not on aspirin yet but is scheduled to see her OB on Monday and assumes she will be referred back to [facility] after that.\par \par She has no known family history of HBV, other liver disease, or liver cancer, but is also unsure if her parents or siblings have been tested. Her parents, two older sisters, and one younger sister all live in [place] now too. Her  was planning to see his PCP to get tested for HBV. He was also born in [facility] and they are unsure if he was vaccinated for HBV previously.\par \par She was born in [facility]. She is  and lives with her  in [place]. She reports that they are monogamous to her knowledge. She has only had male sexual partners in the past. She has a history of treatment for STIs (chlamydia and trichomonas). She previously had a naval piercing (since removed) done at age 17 professionally. She has two professional tattoos: one on her left wrist (done in ) and one on her left shoulder (done in 10/2020). She denies any history of intranasal or IV drug use.

## 2022-12-19 ENCOUNTER — NON-APPOINTMENT (OUTPATIENT)
Age: 27
End: 2022-12-19

## 2022-12-19 LAB
ALBUMIN SERPL ELPH-MCNC: 4.5 G/DL
ALP BLD-CCNC: 54 U/L
ALT SERPL-CCNC: 24 U/L
ANION GAP SERPL CALC-SCNC: 9 MMOL/L
AST SERPL-CCNC: 17 U/L
BASOPHILS # BLD AUTO: 0.04 K/UL
BASOPHILS NFR BLD AUTO: 0.4 %
BILIRUB SERPL-MCNC: 0.2 MG/DL
BUN SERPL-MCNC: 8 MG/DL
CALCIUM SERPL-MCNC: 9.7 MG/DL
CHLORIDE SERPL-SCNC: 102 MMOL/L
CO2 SERPL-SCNC: 24 MMOL/L
CREAT SERPL-MCNC: 0.75 MG/DL
EGFR: 110 ML/MIN/1.73M2
EOSINOPHIL # BLD AUTO: 0.45 K/UL
EOSINOPHIL NFR BLD AUTO: 4.6 %
GGT SERPL-CCNC: 35 U/L
GLUCOSE SERPL-MCNC: 78 MG/DL
HBV DNA # SERPL NAA+PROBE: 2136 IU/ML
HCT VFR BLD CALC: 40.1 %
HEPB DNA PCR INT: DETECTED
HEPB DNA PCR LOG: 3.33 LOGIU/ML
HGB BLD-MCNC: 12.7 G/DL
IMM GRANULOCYTES NFR BLD AUTO: 0.3 %
INR PPP: 0.94 RATIO
LYMPHOCYTES # BLD AUTO: 2.71 K/UL
LYMPHOCYTES NFR BLD AUTO: 27.6 %
MAN DIFF?: NORMAL
MCHC RBC-ENTMCNC: 27.4 PG
MCHC RBC-ENTMCNC: 31.7 GM/DL
MCV RBC AUTO: 86.4 FL
MONOCYTES # BLD AUTO: 0.82 K/UL
MONOCYTES NFR BLD AUTO: 8.4 %
NEUTROPHILS # BLD AUTO: 5.77 K/UL
NEUTROPHILS NFR BLD AUTO: 58.7 %
PLATELET # BLD AUTO: 367 K/UL
POTASSIUM SERPL-SCNC: 4.5 MMOL/L
PROT SERPL-MCNC: 7.3 G/DL
PT BLD: 11.1 SEC
RBC # BLD: 4.64 M/UL
RBC # FLD: 18.4 %
SODIUM SERPL-SCNC: 135 MMOL/L
WBC # FLD AUTO: 9.82 K/UL

## 2023-01-06 ENCOUNTER — NON-APPOINTMENT (OUTPATIENT)
Age: 28
End: 2023-01-06

## 2023-01-06 ENCOUNTER — APPOINTMENT (OUTPATIENT)
Dept: CARDIOLOGY | Facility: CLINIC | Age: 28
End: 2023-01-06
Payer: COMMERCIAL

## 2023-01-06 VITALS — OXYGEN SATURATION: 100 % | SYSTOLIC BLOOD PRESSURE: 111 MMHG | DIASTOLIC BLOOD PRESSURE: 77 MMHG | HEART RATE: 79 BPM

## 2023-01-06 DIAGNOSIS — Z87.59 PERSONAL HISTORY OF OTHER COMPLICATIONS OF PREGNANCY, CHILDBIRTH AND THE PUERPERIUM: ICD-10-CM

## 2023-01-06 DIAGNOSIS — O09.299 SUPERVISION OF PREGNANCY WITH OTHER POOR REPRODUCTIVE OR OBSTETRIC HISTORY, UNSPECIFIED TRIMESTER: ICD-10-CM

## 2023-01-06 PROCEDURE — 99214 OFFICE O/P EST MOD 30 MIN: CPT | Mod: 25

## 2023-01-06 PROCEDURE — 93000 ELECTROCARDIOGRAM COMPLETE: CPT

## 2023-01-06 RX ORDER — NIFEDIPINE 60 MG/1
60 TABLET, EXTENDED RELEASE ORAL DAILY
Qty: 90 | Refills: 3 | Status: DISCONTINUED | COMMUNITY
Start: 2022-10-06 | End: 2023-01-06

## 2023-01-06 RX ORDER — LABETALOL HYDROCHLORIDE 200 MG/1
200 TABLET, FILM COATED ORAL
Qty: 180 | Refills: 3 | Status: ACTIVE | COMMUNITY
Start: 2023-01-06 | End: 1900-01-01

## 2023-02-03 NOTE — CARDIOLOGY SUMMARY
[de-identified] : 1/6/23 -Sinus tachycardia @ 106 bpm [de-identified] : 12/13/22: LVEF 53 % no wall motion abnormalties

## 2023-02-03 NOTE — ASSESSMENT
[FreeTextEntry1] : Ms. Patterson, is a 27 year old [place] female, [place] that presents to the [facility] for a cardiovascular evaluation and assessment.\par \par She carries a history of chronic active viral hepatitis B (unknown etiology) , and a recent intrauterine fetal demise on August 13, 2022 at almost 31 weeks gestation.\par Patient developed  preeclampsia at 31 weeks with elevated blood pressures and bacteremia from a culture done on the day of delivery. \par \par # Hypertension\par    Blood pressure today: 127/ 84\par    Current pressures well controlled on Procardia 60 mg QD\par    Continue on current medical regimen\par    Requested BP log for one week to review. Advised patient to notify if BP prior to medication drops below \par    120 systolic consistently.\par \par #Adverse Cardiovascular Risk Factors  \par  Personal history of prior preeclampsia, hypertension, viral Hepatitis B \par  Family history of hypertension\par \par Recommending a baseline cardiovascular evaluation to assess risk\par \par \par Echocardiogram to assess cardiac structure and function\par Exercise stress testing to assess functional status\par Full blood work panel:  CBC,CMP, Hgb A1C, TSH, Lipid profile\par \par \par Follow up post testing\par \par

## 2023-02-03 NOTE — DISCUSSION/SUMMARY
[EKG obtained to assist in diagnosis and management of assessed problem(s)] : EKG obtained to assist in diagnosis and management of assessed problem(s) [FreeTextEntry1] : Ms. Patterson, is a 27 year old [place] female, [employer] carries a history of chronic active viral hepatitis B (unknown etiology), Chronic HTN, prior history of severe PEC @ 31 weeks and IUFD August 13, 2022 currently pregnant GA 10 weeks ( 8/1/23) presents in for follow up. TTE 53 %. \par \par # Prior history of severe PEC : \par BP Stable \par D/c Nifedipine ( due to HA s) \par Labetalol 200 mg bid \par Encouraged Patient to monitor BP at home and keep a log and report results back to us for evaluation. Advised to send in BP log 2 weeks\par Additionally, encouraged heart healthy diet and exercise as tolerated.\par EKG with no acute changes.\par Encouraged patient to continue healthy exercise and eating habits, focusing on a Mediterranean style of eating and aiming for the recommended 150 minutes per week of moderate physical activity.\par Asa 81 mg QD \par \par Fu 6 weeks \par

## 2023-02-03 NOTE — HISTORY OF PRESENT ILLNESS
[FreeTextEntry1] : Ms. Patterson, is a 27 year old [place] female, [employer] carries a history of chronic active viral hepatitis B (unknown etiology), Chronic HTN, prior history of severe PEC @ 31 weeks and IUFD August 13, 2022 currently pregnant GA 10 weeks ( 8/1/23) presents in for follow up. TTE 53 %. C/o HA eery day after taking Nifedipine. \par \par # Prior history of severe PEC : \par BP Stable \par D/c Nifedipine ( due to HA s) \par Labetalol 200 mg bid \par Encouraged Patient to monitor BP at home and keep a log and report results back to us for evaluation. Advised to send in BP log 2 weeks\par Additionally, encouraged heart healthy diet and exercise as tolerated.\par EKG with no acute changes.\par Encouraged patient to continue healthy exercise and eating habits, focusing on a Mediterranean style of eating and aiming for the recommended 150 minutes per week of moderate physical activity.\par Asa 81 mg QD \par \par Fu 6 weeks \par \par \par

## 2023-02-09 NOTE — CARDIOLOGY SUMMARY
[de-identified] : 1/6/23 -Sinus tachycardia @ 106 bpm [de-identified] : 12/13/22: LVEF 53 % no wall motion abnormalties

## 2023-02-09 NOTE — DISCUSSION/SUMMARY
[FreeTextEntry1] : Ms. Patterson, is a 27 year old [place] female, [employer] carries a history of chronic active viral hepatitis B (unknown etiology), Chronic HTN, prior history of severe PEC @ 31 weeks and IUFD August 13, 2022 currently pregnant GA 10 weeks ( 8/1/23) presents in for follow up. TTE 53 %. \par \par # Prior history of severe PEC : \par BP Stable \par D/c Nifedipine ( due to HA s) \par Labetalol 200 mg bid \par Encouraged Patient to monitor BP at home and keep a log and report results back to us for evaluation. Advised to send in BP log 2 weeks\par Additionally, encouraged heart healthy diet and exercise as tolerated.\par EKG with no acute changes.\par Encouraged patient to continue healthy exercise and eating habits, focusing on a Mediterranean style of eating and aiming for the recommended 150 minutes per week of moderate physical activity.\par Asa 81 mg QD \par \par Fu 6 weeks \par

## 2023-02-09 NOTE — HISTORY OF PRESENT ILLNESS
[Home] : at home, [unfilled] , at the time of the visit. [Other Location: e.g. Home (Enter Location, City,State)___] : at [unfilled] [Verbal consent obtained from patient] : the patient, [unfilled] [FreeTextEntry1] : Ms. Patterson, is a 27 year old [place] female, [employer] carries a history of chronic active viral hepatitis B (unknown etiology), Chronic HTN, prior history of severe PEC @ 31 weeks and IUFD August 13, 2022 currently pregnant GA 10 weeks ( 8/1/23) presents in for follow up. TTE 53 %. C/o HA eery day after taking Nifedipine. \par \par # Prior history of severe PEC : \par BP Stable \par D/c Nifedipine ( due to HA s) \par Labetalol 200 mg bid \par Encouraged Patient to monitor BP at home and keep a log and report results back to us for evaluation. Advised to send in BP log 2 weeks\par Additionally, encouraged heart healthy diet and exercise as tolerated.\par EKG with no acute changes.\par Encouraged patient to continue healthy exercise and eating habits, focusing on a Mediterranean style of eating and aiming for the recommended 150 minutes per week of moderate physical activity.\par Asa 81 mg QD \par \par Fu 6 weeks \par \par \par

## 2023-02-13 ENCOUNTER — APPOINTMENT (OUTPATIENT)
Dept: ANTEPARTUM | Facility: CLINIC | Age: 28
End: 2023-02-13
Payer: COMMERCIAL

## 2023-02-13 ENCOUNTER — ASOB RESULT (OUTPATIENT)
Age: 28
End: 2023-02-13

## 2023-02-13 ENCOUNTER — OUTPATIENT (OUTPATIENT)
Dept: OUTPATIENT SERVICES | Facility: HOSPITAL | Age: 28
LOS: 1 days | End: 2023-02-13
Payer: COMMERCIAL

## 2023-02-13 ENCOUNTER — APPOINTMENT (OUTPATIENT)
Dept: MATERNAL FETAL MEDICINE | Facility: CLINIC | Age: 28
End: 2023-02-13
Payer: COMMERCIAL

## 2023-02-13 VITALS
SYSTOLIC BLOOD PRESSURE: 126 MMHG | OXYGEN SATURATION: 97 % | HEART RATE: 94 BPM | BODY MASS INDEX: 32.81 KG/M2 | HEIGHT: 66 IN | WEIGHT: 204.13 LBS | DIASTOLIC BLOOD PRESSURE: 77 MMHG

## 2023-02-13 VITALS
TEMPERATURE: 98 F | DIASTOLIC BLOOD PRESSURE: 79 MMHG | OXYGEN SATURATION: 99 % | HEART RATE: 101 BPM | SYSTOLIC BLOOD PRESSURE: 126 MMHG

## 2023-02-13 DIAGNOSIS — O26.899 OTHER SPECIFIED PREGNANCY RELATED CONDITIONS, UNSPECIFIED TRIMESTER: ICD-10-CM

## 2023-02-13 LAB
APPEARANCE UR: CLEAR — SIGNIFICANT CHANGE UP
BASOPHILS # BLD AUTO: 0.04 K/UL — SIGNIFICANT CHANGE UP (ref 0–0.2)
BASOPHILS NFR BLD AUTO: 0.4 % — SIGNIFICANT CHANGE UP (ref 0–2)
BILIRUB UR-MCNC: NEGATIVE — SIGNIFICANT CHANGE UP
BLD GP AB SCN SERPL QL: NEGATIVE — SIGNIFICANT CHANGE UP
COLOR SPEC: SIGNIFICANT CHANGE UP
DIFF PNL FLD: NEGATIVE — SIGNIFICANT CHANGE UP
EOSINOPHIL # BLD AUTO: 0.81 K/UL — HIGH (ref 0–0.5)
EOSINOPHIL NFR BLD AUTO: 8.4 % — HIGH (ref 0–6)
GLUCOSE UR QL: NEGATIVE — SIGNIFICANT CHANGE UP
HCT VFR BLD CALC: 33.6 % — LOW (ref 34.5–45)
HGB BLD-MCNC: 10.8 G/DL — LOW (ref 11.5–15.5)
IMM GRANULOCYTES NFR BLD AUTO: 0.6 % — SIGNIFICANT CHANGE UP (ref 0–0.9)
KETONES UR-MCNC: NEGATIVE — SIGNIFICANT CHANGE UP
LEUKOCYTE ESTERASE UR-ACNC: ABNORMAL
LYMPHOCYTES # BLD AUTO: 1.71 K/UL — SIGNIFICANT CHANGE UP (ref 1–3.3)
LYMPHOCYTES # BLD AUTO: 17.7 % — SIGNIFICANT CHANGE UP (ref 13–44)
MCHC RBC-ENTMCNC: 27.3 PG — SIGNIFICANT CHANGE UP (ref 27–34)
MCHC RBC-ENTMCNC: 32.1 GM/DL — SIGNIFICANT CHANGE UP (ref 32–36)
MCV RBC AUTO: 85.1 FL — SIGNIFICANT CHANGE UP (ref 80–100)
MONOCYTES # BLD AUTO: 0.64 K/UL — SIGNIFICANT CHANGE UP (ref 0–0.9)
MONOCYTES NFR BLD AUTO: 6.6 % — SIGNIFICANT CHANGE UP (ref 2–14)
NEUTROPHILS # BLD AUTO: 6.42 K/UL — SIGNIFICANT CHANGE UP (ref 1.8–7.4)
NEUTROPHILS NFR BLD AUTO: 66.3 % — SIGNIFICANT CHANGE UP (ref 43–77)
NITRITE UR-MCNC: NEGATIVE — SIGNIFICANT CHANGE UP
NRBC # BLD: 0 /100 WBCS — SIGNIFICANT CHANGE UP (ref 0–0)
PH UR: 7 — SIGNIFICANT CHANGE UP (ref 5–8)
PLATELET # BLD AUTO: 302 K/UL — SIGNIFICANT CHANGE UP (ref 150–400)
PROT UR-MCNC: ABNORMAL
RBC # BLD: 3.95 M/UL — SIGNIFICANT CHANGE UP (ref 3.8–5.2)
RBC # FLD: 13.5 % — SIGNIFICANT CHANGE UP (ref 10.3–14.5)
RH IG SCN BLD-IMP: POSITIVE — SIGNIFICANT CHANGE UP
SP GR SPEC: 1.03 — HIGH (ref 1.01–1.02)
UROBILINOGEN FLD QL: NEGATIVE — SIGNIFICANT CHANGE UP
WBC # BLD: 9.68 K/UL — SIGNIFICANT CHANGE UP (ref 3.8–10.5)
WBC # FLD AUTO: 9.68 K/UL — SIGNIFICANT CHANGE UP (ref 3.8–10.5)

## 2023-02-13 PROCEDURE — 99214 OFFICE O/P EST MOD 30 MIN: CPT

## 2023-02-13 PROCEDURE — 85025 COMPLETE CBC W/AUTO DIFF WBC: CPT

## 2023-02-13 PROCEDURE — G0463: CPT

## 2023-02-13 PROCEDURE — 86850 RBC ANTIBODY SCREEN: CPT

## 2023-02-13 PROCEDURE — 86900 BLOOD TYPING SEROLOGIC ABO: CPT

## 2023-02-13 PROCEDURE — 76817 TRANSVAGINAL US OBSTETRIC: CPT

## 2023-02-13 PROCEDURE — 86901 BLOOD TYPING SEROLOGIC RH(D): CPT

## 2023-02-13 PROCEDURE — 36415 COLL VENOUS BLD VENIPUNCTURE: CPT

## 2023-02-13 PROCEDURE — 87086 URINE CULTURE/COLONY COUNT: CPT

## 2023-02-13 PROCEDURE — 81001 URINALYSIS AUTO W/SCOPE: CPT

## 2023-02-13 PROCEDURE — 76805 OB US >/= 14 WKS SNGL FETUS: CPT

## 2023-02-13 NOTE — OB PROVIDER TRIAGE NOTE - NSHPPHYSICALEXAM_GEN_ALL_CORE
VS  T(C): 36.9 (02-13-23 @ 13:20)  HR: 88 (02-13-23 @ 13:20)  BP: 123/76 (02-13-23 @ 13:20)  RR: 18 (02-13-23 @ 12:04)  SpO2: 99% (02-13-23 @ 13:20)    Gen: well appearing, NAD   Abd: Soft, nontender   Ext: Moving all    SSE: Minimal physiologic discharge, cervix closed  VE: 0/0/-3    [place]: None

## 2023-02-13 NOTE — OB RN TRIAGE NOTE - FALL HARM RISK - UNIVERSAL INTERVENTIONS
Bed in lowest position, wheels locked, appropriate side rails in place/Call bell, personal items and telephone in reach/Instruct patient to call for assistance before getting out of bed or chair/Non-slip footwear when patient is out of bed/[place] to call system/Physically safe environment - no spills, clutter or unnecessary equipment/Purposeful Proactive Rounding/Room/bathroom lighting operational, light cord in reach

## 2023-02-13 NOTE — OB PROVIDER TRIAGE NOTE - HISTORY OF PRESENT ILLNESS
26yo  at 15w6d GA presenting for pre-op evaluation for cerclage. Patient was seen at ATU today and has cervical length of 1.1cm. She was sent for evaluation to rule out infection/labor. Patient endorses minimal vaginal discharge. No LOF, VB, CTX.     PNC: Hernandez     OBHx: 29wk IUFD c/b exam indicated cerclage, SABx1  GynHx: Recurrent BV/Candida   PMSHx: cHTN, hernia repair in childhood    Meds: Labetalol   All: NKDA   Soc: denies

## 2023-02-13 NOTE — OB PROVIDER TRIAGE NOTE - NSOBPROVIDERNOTE_OBGYN_ALL_OB_FT
26yo  at 15w6d presenting for preop evaluation for cerclage in setting of CL 1.1cm. No contractions on toco. Cervix closed.     - UA, UCx, Affirm, GC/CT sent   - CBC/T+S sent   - Booked for cerclage at 1030 on Thursday with Dr. Chino Sainz MD PGY3   d/w Dr. Magana, Dr. Whitehead

## 2023-02-14 LAB
CULTURE RESULTS: SIGNIFICANT CHANGE UP
SPECIMEN SOURCE: SIGNIFICANT CHANGE UP

## 2023-02-15 ENCOUNTER — TRANSCRIPTION ENCOUNTER (OUTPATIENT)
Age: 28
End: 2023-02-15

## 2023-02-15 DIAGNOSIS — N89.8 OTHER SPECIFIED NONINFLAMMATORY DISORDERS OF VAGINA: ICD-10-CM

## 2023-02-15 DIAGNOSIS — Z3A.15 15 WEEKS GESTATION OF PREGNANCY: ICD-10-CM

## 2023-02-15 DIAGNOSIS — O09.292 SUPERVISION OF PREGNANCY WITH OTHER POOR REPRODUCTIVE OR OBSTETRIC HISTORY, SECOND TRIMESTER: ICD-10-CM

## 2023-02-15 DIAGNOSIS — O26.872 CERVICAL SHORTENING, SECOND TRIMESTER: ICD-10-CM

## 2023-02-15 DIAGNOSIS — Z86.19 PERSONAL HISTORY OF OTHER INFECTIOUS AND PARASITIC DISEASES: ICD-10-CM

## 2023-02-15 DIAGNOSIS — O10.912 UNSPECIFIED PRE-EXISTING HYPERTENSION COMPLICATING PREGNANCY, SECOND TRIMESTER: ICD-10-CM

## 2023-02-15 DIAGNOSIS — O99.891 OTHER SPECIFIED DISEASES AND CONDITIONS COMPLICATING PREGNANCY: ICD-10-CM

## 2023-02-16 ENCOUNTER — TRANSCRIPTION ENCOUNTER (OUTPATIENT)
Age: 28
End: 2023-02-16

## 2023-02-16 ENCOUNTER — APPOINTMENT (OUTPATIENT)
Dept: CARDIOLOGY | Facility: CLINIC | Age: 28
End: 2023-02-16

## 2023-02-16 ENCOUNTER — OUTPATIENT (OUTPATIENT)
Dept: OUTPATIENT SERVICES | Facility: HOSPITAL | Age: 28
LOS: 1 days | End: 2023-02-16
Payer: COMMERCIAL

## 2023-02-16 VITALS
TEMPERATURE: 98 F | SYSTOLIC BLOOD PRESSURE: 112 MMHG | HEART RATE: 82 BPM | RESPIRATION RATE: 18 BRPM | HEIGHT: 66 IN | WEIGHT: 203.93 LBS | DIASTOLIC BLOOD PRESSURE: 78 MMHG

## 2023-02-16 VITALS
RESPIRATION RATE: 18 BRPM | OXYGEN SATURATION: 98 % | SYSTOLIC BLOOD PRESSURE: 122 MMHG | DIASTOLIC BLOOD PRESSURE: 85 MMHG | HEART RATE: 86 BPM | TEMPERATURE: 99 F

## 2023-02-16 VITALS
TEMPERATURE: 98 F | HEART RATE: 82 BPM | SYSTOLIC BLOOD PRESSURE: 112 MMHG | RESPIRATION RATE: 18 BRPM | DIASTOLIC BLOOD PRESSURE: 75 MMHG

## 2023-02-16 DIAGNOSIS — O26.872 CERVICAL SHORTENING, SECOND TRIMESTER: ICD-10-CM

## 2023-02-16 DIAGNOSIS — Z98.890 OTHER SPECIFIED POSTPROCEDURAL STATES: Chronic | ICD-10-CM

## 2023-02-16 DIAGNOSIS — O34.32 MATERNAL CARE FOR CERVICAL INCOMPETENCE, SECOND TRIMESTER: ICD-10-CM

## 2023-02-16 DIAGNOSIS — O09.292 SUPERVISION OF PREGNANCY WITH OTHER POOR REPRODUCTIVE OR OBSTETRIC HISTORY, SECOND TRIMESTER: ICD-10-CM

## 2023-02-16 LAB — SARS-COV-2 RNA SPEC QL NAA+PROBE: SIGNIFICANT CHANGE UP

## 2023-02-16 PROCEDURE — 59320 REVISION OF CERVIX: CPT

## 2023-02-16 PROCEDURE — 87635 SARS-COV-2 COVID-19 AMP PRB: CPT

## 2023-02-16 RX ORDER — CITRIC ACID/SODIUM CITRATE 300-500 MG
15 SOLUTION, ORAL ORAL ONCE
Refills: 0 | Status: COMPLETED | OUTPATIENT
Start: 2023-02-16 | End: 2023-02-16

## 2023-02-16 RX ORDER — INDOMETHACIN 50 MG
50 CAPSULE ORAL ONCE
Refills: 0 | Status: COMPLETED | OUTPATIENT
Start: 2023-02-16 | End: 2023-02-16

## 2023-02-16 RX ORDER — SODIUM CHLORIDE 9 MG/ML
1000 INJECTION, SOLUTION INTRAVENOUS
Refills: 0 | Status: DISCONTINUED | OUTPATIENT
Start: 2023-02-16 | End: 2023-03-02

## 2023-02-16 RX ORDER — INDOMETHACIN 50 MG
1 CAPSULE ORAL
Qty: 7 | Refills: 0
Start: 2023-02-16 | End: 2023-02-17

## 2023-02-16 RX ORDER — INFLUENZA VIRUS VACCINE 15; 15; 15; 15 UG/.5ML; UG/.5ML; UG/.5ML; UG/.5ML
0.5 SUSPENSION INTRAMUSCULAR ONCE
Refills: 0 | Status: DISCONTINUED | OUTPATIENT
Start: 2023-02-16 | End: 2023-03-02

## 2023-02-16 RX ORDER — METRONIDAZOLE 500 MG
500 TABLET ORAL ONCE
Refills: 0 | Status: DISCONTINUED | OUTPATIENT
Start: 2023-02-16 | End: 2023-03-02

## 2023-02-16 RX ADMIN — Medication 50 MILLIGRAM(S): at 13:40

## 2023-02-16 RX ADMIN — Medication 50 MILLIGRAM(S): at 12:46

## 2023-02-16 RX ADMIN — Medication 15 MILLILITER(S): at 10:24

## 2023-02-16 NOTE — OB RN PATIENT PROFILE - BLOOD TRANSFUSION, PREVIOUS, PROFILE
Thank you for the opportunity to participate in the care of this interesting patient. Please do contact me if I may be of any further assistance  Gabriel Boothe MD Health system Hematology/Oncology 
no

## 2023-02-16 NOTE — DISCHARGE NOTE ANTEPARTUM - CARE PLAN
Principal Discharge DX:	Cervical incompetence, antepartum, second trimester  Assessment and plan of treatment:	Follow up with your doctor as scheduled for continued monitoring of the cervix and routine pregnancy care. Call your doctor if you experience heavy bleeding, cramping, leaking fluid like your water broke.   1 Principal Discharge DX:	Cervical incompetence, antepartum, second trimester  Assessment and plan of treatment:	Follow up with your doctor as scheduled for continued monitoring of the cervix and routine pregnancy care. Call your doctor if you experience heavy bleeding, cramping, leaking fluid like your water broke.    A prescription for Indocin was sent to your pharmacy. Please continue to take this medication for 48hrs.

## 2023-02-16 NOTE — OB PROVIDER H&P - HISTORY OF PRESENT ILLNESS
27 y.o. B Female  EDC 23 IUP @ 16 2/7 wks. gest., H/O 29 week loss & Cervical Insufficiency, who presents for Cerclage Placement.  (+) FM.  (-) Ctx/cramping.  (-) Vaginal Bleeding/fluid.  LMP 10/25/23    POBHx:   SAB, 1st trimester               IUFD @ 29 wks. gest., s/p Cerclage Placement @ 21-22 wks. gest, then removal for , Dx'ed with PEC tx'ed with MgSO4 & Labetalol    PGYnHx:  Denies fibroids, endometriosis, STD, Abn Pap, ovarian cyst    PMHx:  CHTN Dx'ed during IOL, on Labetalol    PSHx:  age 2 Valentín. Inguinal Herniorraphy           Cerclage Placement & removal    Meds:  PNV 1 p.o. daily             Labetalol 200 mg. p.o. BID             Baby ASA 1 alternating with 2 tabs, last dose yesterday - took one    NKDA    SocHx:  Denies smoking/ETOH/Illegal drug use    FHx:  Denies

## 2023-02-16 NOTE — PRE-ANESTHESIA EVALUATION ADULT - NSANTHVITALSIGNSFT_GEN_ALL_CORE
ICU Vital Signs Last 24 Hrs  T(C): --  T(F): --  HR: 82 (16 Feb 2023 08:20) (82 - 82)  BP: 112/75 (16 Feb 2023 08:20) (112/75 - 112/75)  BP(mean): --  ABP: --  ABP(mean): --  RR: --  SpO2: --

## 2023-02-16 NOTE — DISCHARGE NOTE ANTEPARTUM - MEDICATION SUMMARY - MEDICATIONS TO TAKE
I will START or STAY ON the medications listed below when I get home from the hospital:  None I will START or STAY ON the medications listed below when I get home from the hospital:    indomethacin 25 mg oral capsule  -- 1 cap(s) by mouth every 6 hours   -- Do not take aspirin or aspirin containing products without knowledge and consent of your physician.  It is very important that you take or use this exactly as directed.  Do not skip doses or discontinue unless directed by your doctor.  May cause drowsiness or dizziness.  Obtain medical advice before taking any non-prescription drugs as some may affect the action of this medication.  Take with food or milk.    -- Indication: For S/P Cerclage

## 2023-02-16 NOTE — OB PROVIDER H&P - NSHPPHYSICALEXAM_GEN_ALL_CORE
Heart:  NSR  Lungs:  Abdoulaye. Clear  Abd:  soft, NT, gravit uterus to ~16 wks. size    SONO:  transverse, FH = 160 BPM

## 2023-02-16 NOTE — OB RN PATIENT PROFILE - WEIGHT: TOTAL WEIGHT IN LBS
Suturegard Intro: Intraoperative tissue expansion was performed, utilizing the SUTUREGARD device, in order to reduce wound tension. 16

## 2023-02-16 NOTE — DISCHARGE NOTE ANTEPARTUM - CARE PROVIDER_API CALL
Katja Hernandez (DO)  [facility] [address], [address]  [place] [address]  Phone: [phone]  Fax: [phone]  Follow Up Time:

## 2023-02-16 NOTE — PRE-ANESTHESIA EVALUATION ADULT - NSANTHPMHFT_GEN_ALL_CORE
26yo  at 16weeks presenting for cerclage. Patient endorses minimal vaginal discharge. No LOF, VB, CTX. Pmhx of HTN since her last pregnancy controlled with Labetalol. Reports hx of preeclampsia with last pregnancy and prior spinal anesthetic for cerclage placement as well without complciation. Denies back pain or back surgery.      PNC: Hernandez     OBHx: 29wk IUFD c/b exam indicated cerclage, SABx1  GynHx: Recurrent BV/Candida   PMSHx: cHTN, hernia repair in childhood    Meds: Labetalol   All: NKDA   Soc: denies

## 2023-02-16 NOTE — OB PROVIDER H&P - ASSESSMENT
27 y.o. B Female  EDC 23 IUP @ 16 2/7 wks. gest., H/O 29 week loss & Cervical Insufficiency, who presents for Cerclage Placement.  (+) FM.  (-) Ctx/cramping.  (-) Vaginal Bleeding/fluid.  LMP 10/25/23

## 2023-02-16 NOTE — DISCHARGE NOTE ANTEPARTUM - PATIENT PORTAL LINK FT
You can access the FollowMyHealth Patient Portal offered by [facility] by registering at the following website: [email or web address]. By joining [employer]’s FollowMyHealth portal, you will also be able to view your health information using other applications (apps) compatible with our system.

## 2023-02-16 NOTE — DISCHARGE NOTE ANTEPARTUM - NS MD DC FALL RISK RISK
For information on Fall & Injury Prevention, visit: [email or web address] OR  [email or web address] OR  https://www.cdc.gov/steadi/patient.html 270

## 2023-02-16 NOTE — PACU DISCHARGE NOTE - PAIN:
BMI above normal limits, recommended weight loss, improve diet and follow up with internist. Controlled with current regime

## 2023-02-16 NOTE — BRIEF OPERATIVE NOTE - OPERATION/FINDINGS
26 yo  at 16w2d for ultrasound indicated cerclage in the setting of prior history of exam indicated cerclage and cervical shortening to 1.3cm this pregnancy. Emerson cerclage placed using TIcron suture, tied at 12 o clock position (11 knots). Patient and fetus tolerated well with fetal heart rate noted prior and post procedure. A dose of Flagyl given intraoperatively.

## 2023-02-16 NOTE — BRIEF OPERATIVE NOTE - NSICDXBRIEFPREOP_GEN_ALL_CORE_FT
PRE-OP DIAGNOSIS:  Incompetent cervix in pregnancy, second trimester 16-Feb-2023 12:00:16  Sandy Xavier

## 2023-02-16 NOTE — PRE-ANESTHESIA EVALUATION ADULT - NSANTHLABRESULTSFT_GEN_ALL_CORE
Lactate Trend            CAPILLARY BLOOD GLUCOSE            Culture Results:   <10,000 CFU/mL Normal Urogenital Arielle (02-13 @ 13:32)

## 2023-02-16 NOTE — DISCHARGE NOTE ANTEPARTUM - HOSPITAL COURSE
Patient was admitted for scheduled cerclage due to finding of cervical length 1.1cm on TVUS. Cerclage was placed without complication. Patient was discharged home once she was ambulating without assistance, voiding spontaneously. Patient to follow up for routine pregnancy care and continued cervical length surveillance.

## 2023-02-16 NOTE — OB PROVIDER H&P - NS_OBGYNHISTORY_OBGYN_ALL_OB_FT
POBHx:  2017 SAB, 1st trimester               IUFD @ 29 wks. gest., s/p Cerclage Placement @ 21-22 wks. gest, then removal for , Dx'ed with PEC tx'ed with MgSO4 & Labetalol    PGYnHx:  Denies fibroids, endometriosis, STD, Abn Pap, ovarian cyst

## 2023-02-16 NOTE — DISCHARGE NOTE ANTEPARTUM - PLAN OF CARE
Follow up with your doctor as scheduled for continued monitoring of the cervix and routine pregnancy care. Call your doctor if you experience heavy bleeding, cramping, leaking fluid like your water broke. Follow up with your doctor as scheduled for continued monitoring of the cervix and routine pregnancy care. Call your doctor if you experience heavy bleeding, cramping, leaking fluid like your water broke.    A prescription for Indocin was sent to your pharmacy. Please continue to take this medication for 48hrs.

## 2023-02-16 NOTE — BRIEF OPERATIVE NOTE - NSICDXBRIEFPOSTOP_GEN_ALL_CORE_FT
POST-OP DIAGNOSIS:  Incompetent cervix in pregnancy, second trimester 16-Feb-2023 12:00:51  Sandy Xavier

## 2023-02-28 ENCOUNTER — APPOINTMENT (OUTPATIENT)
Dept: ANTEPARTUM | Facility: CLINIC | Age: 28
End: 2023-02-28

## 2023-02-28 PROBLEM — I10 ESSENTIAL (PRIMARY) HYPERTENSION: Chronic | Status: ACTIVE | Noted: 2023-02-16

## 2023-03-07 ENCOUNTER — APPOINTMENT (OUTPATIENT)
Dept: ANTEPARTUM | Facility: CLINIC | Age: 28
End: 2023-03-07
Payer: COMMERCIAL

## 2023-03-07 ENCOUNTER — ASOB RESULT (OUTPATIENT)
Age: 28
End: 2023-03-07

## 2023-03-07 PROCEDURE — 76817 TRANSVAGINAL US OBSTETRIC: CPT

## 2023-03-07 PROCEDURE — 76815 OB US LIMITED FETUS(S): CPT

## 2023-03-20 ENCOUNTER — APPOINTMENT (OUTPATIENT)
Dept: ANTEPARTUM | Facility: CLINIC | Age: 28
End: 2023-03-20
Payer: COMMERCIAL

## 2023-03-20 ENCOUNTER — ASOB RESULT (OUTPATIENT)
Age: 28
End: 2023-03-20

## 2023-03-20 ENCOUNTER — APPOINTMENT (OUTPATIENT)
Dept: MATERNAL FETAL MEDICINE | Facility: CLINIC | Age: 28
End: 2023-03-20
Payer: COMMERCIAL

## 2023-03-20 VITALS
SYSTOLIC BLOOD PRESSURE: 127 MMHG | WEIGHT: 206.25 LBS | BODY MASS INDEX: 33.15 KG/M2 | HEART RATE: 95 BPM | HEIGHT: 66 IN | DIASTOLIC BLOOD PRESSURE: 76 MMHG | OXYGEN SATURATION: 97 %

## 2023-03-20 PROCEDURE — 99214 OFFICE O/P EST MOD 30 MIN: CPT | Mod: 25

## 2023-03-20 PROCEDURE — 76817 TRANSVAGINAL US OBSTETRIC: CPT

## 2023-03-20 PROCEDURE — 76811 OB US DETAILED SNGL FETUS: CPT

## 2023-03-23 ENCOUNTER — APPOINTMENT (OUTPATIENT)
Dept: HEPATOLOGY | Facility: CLINIC | Age: 28
End: 2023-03-23

## 2023-04-07 ENCOUNTER — ASOB RESULT (OUTPATIENT)
Age: 28
End: 2023-04-07

## 2023-04-07 ENCOUNTER — APPOINTMENT (OUTPATIENT)
Dept: ANTEPARTUM | Facility: CLINIC | Age: 28
End: 2023-04-07
Payer: COMMERCIAL

## 2023-04-07 PROCEDURE — 76817 TRANSVAGINAL US OBSTETRIC: CPT

## 2023-04-07 PROCEDURE — 76815 OB US LIMITED FETUS(S): CPT

## 2023-05-03 ENCOUNTER — ASOB RESULT (OUTPATIENT)
Age: 28
End: 2023-05-03

## 2023-05-03 ENCOUNTER — APPOINTMENT (OUTPATIENT)
Dept: ANTEPARTUM | Facility: CLINIC | Age: 28
End: 2023-05-03
Payer: COMMERCIAL

## 2023-05-03 PROCEDURE — 76819 FETAL BIOPHYS PROFIL W/O NST: CPT

## 2023-05-03 PROCEDURE — 76816 OB US FOLLOW-UP PER FETUS: CPT

## 2023-05-08 ENCOUNTER — ASOB RESULT (OUTPATIENT)
Age: 28
End: 2023-05-08

## 2023-05-08 ENCOUNTER — APPOINTMENT (OUTPATIENT)
Dept: MATERNAL FETAL MEDICINE | Facility: CLINIC | Age: 28
End: 2023-05-08
Payer: COMMERCIAL

## 2023-05-08 ENCOUNTER — APPOINTMENT (OUTPATIENT)
Dept: MATERNAL FETAL MEDICINE | Facility: CLINIC | Age: 28
End: 2023-05-08

## 2023-05-08 DIAGNOSIS — O24.419 GESTATIONAL DIABETES MELLITUS IN PREGNANCY, UNSPECIFIED CONTROL: ICD-10-CM

## 2023-05-08 PROCEDURE — G0109 DIAB MANAGE TRN IND/GROUP: CPT | Mod: 95

## 2023-05-08 RX ORDER — URINE ACETONE TEST STRIPS
STRIP MISCELLANEOUS
Qty: 1 | Refills: 2 | Status: ACTIVE | COMMUNITY
Start: 2023-05-08 | End: 1900-01-01

## 2023-05-08 RX ORDER — BLOOD-GLUCOSE METER
W/DEVICE KIT MISCELLANEOUS
Qty: 1 | Refills: 0 | Status: ACTIVE | COMMUNITY
Start: 2023-05-08 | End: 1900-01-01

## 2023-05-08 RX ORDER — BLOOD-GLUCOSE METER
KIT MISCELLANEOUS
Qty: 2 | Refills: 0 | Status: ACTIVE | COMMUNITY
Start: 2023-05-08 | End: 1900-01-01

## 2023-05-08 RX ORDER — LANCETS 33 GAUGE
EACH MISCELLANEOUS
Qty: 4 | Refills: 2 | Status: ACTIVE | COMMUNITY
Start: 2023-05-08 | End: 1900-01-01

## 2023-05-11 ENCOUNTER — APPOINTMENT (OUTPATIENT)
Dept: ANTEPARTUM | Facility: CLINIC | Age: 28
End: 2023-05-11
Payer: COMMERCIAL

## 2023-05-11 ENCOUNTER — ASOB RESULT (OUTPATIENT)
Age: 28
End: 2023-05-11

## 2023-05-11 PROCEDURE — 76819 FETAL BIOPHYS PROFIL W/O NST: CPT

## 2023-05-18 ENCOUNTER — ASOB RESULT (OUTPATIENT)
Age: 28
End: 2023-05-18

## 2023-05-18 ENCOUNTER — APPOINTMENT (OUTPATIENT)
Dept: ANTEPARTUM | Facility: CLINIC | Age: 28
End: 2023-05-18
Payer: COMMERCIAL

## 2023-05-18 PROCEDURE — 76819 FETAL BIOPHYS PROFIL W/O NST: CPT

## 2023-05-19 ENCOUNTER — APPOINTMENT (OUTPATIENT)
Dept: ANTEPARTUM | Facility: CLINIC | Age: 28
End: 2023-05-19

## 2023-05-24 ENCOUNTER — ASOB RESULT (OUTPATIENT)
Age: 28
End: 2023-05-24

## 2023-05-24 ENCOUNTER — APPOINTMENT (OUTPATIENT)
Dept: MATERNAL FETAL MEDICINE | Facility: CLINIC | Age: 28
End: 2023-05-24
Payer: COMMERCIAL

## 2023-05-24 PROCEDURE — G0108 DIAB MANAGE TRN  PER INDIV: CPT | Mod: 95

## 2023-05-25 ENCOUNTER — APPOINTMENT (OUTPATIENT)
Dept: ANTEPARTUM | Facility: CLINIC | Age: 28
End: 2023-05-25
Payer: COMMERCIAL

## 2023-05-25 ENCOUNTER — ASOB RESULT (OUTPATIENT)
Age: 28
End: 2023-05-25

## 2023-05-25 PROCEDURE — 76818 FETAL BIOPHYS PROFILE W/NST: CPT

## 2023-06-01 ENCOUNTER — ASOB RESULT (OUTPATIENT)
Age: 28
End: 2023-06-01

## 2023-06-01 ENCOUNTER — APPOINTMENT (OUTPATIENT)
Dept: ANTEPARTUM | Facility: CLINIC | Age: 28
End: 2023-06-01
Payer: COMMERCIAL

## 2023-06-01 PROCEDURE — 76816 OB US FOLLOW-UP PER FETUS: CPT

## 2023-06-01 PROCEDURE — 76818 FETAL BIOPHYS PROFILE W/NST: CPT

## 2023-06-08 ENCOUNTER — ASOB RESULT (OUTPATIENT)
Age: 28
End: 2023-06-08

## 2023-06-08 ENCOUNTER — APPOINTMENT (OUTPATIENT)
Dept: ANTEPARTUM | Facility: CLINIC | Age: 28
End: 2023-06-08
Payer: COMMERCIAL

## 2023-06-08 PROCEDURE — 76818 FETAL BIOPHYS PROFILE W/NST: CPT

## 2023-06-12 ENCOUNTER — APPOINTMENT (OUTPATIENT)
Dept: ANTEPARTUM | Facility: CLINIC | Age: 28
End: 2023-06-12
Payer: COMMERCIAL

## 2023-06-12 ENCOUNTER — APPOINTMENT (OUTPATIENT)
Dept: MATERNAL FETAL MEDICINE | Facility: CLINIC | Age: 28
End: 2023-06-12
Payer: COMMERCIAL

## 2023-06-12 ENCOUNTER — ASOB RESULT (OUTPATIENT)
Age: 28
End: 2023-06-12

## 2023-06-12 VITALS
DIASTOLIC BLOOD PRESSURE: 75 MMHG | HEART RATE: 114 BPM | HEIGHT: 66 IN | WEIGHT: 217.25 LBS | TEMPERATURE: 98.1 F | OXYGEN SATURATION: 98 % | BODY MASS INDEX: 34.91 KG/M2 | SYSTOLIC BLOOD PRESSURE: 120 MMHG

## 2023-06-12 VITALS — HEART RATE: 109 BPM

## 2023-06-12 DIAGNOSIS — O98.419 VIRAL HEPATITIS COMPLICATING PREGNANCY, UNSPECIFIED TRIMESTER: ICD-10-CM

## 2023-06-12 DIAGNOSIS — B19.10 VIRAL HEPATITIS COMPLICATING PREGNANCY, UNSPECIFIED TRIMESTER: ICD-10-CM

## 2023-06-12 PROCEDURE — G0108 DIAB MANAGE TRN  PER INDIV: CPT

## 2023-06-12 PROCEDURE — 76818 FETAL BIOPHYS PROFILE W/NST: CPT

## 2023-06-12 PROCEDURE — 99214 OFFICE O/P EST MOD 30 MIN: CPT | Mod: 25

## 2023-06-13 ENCOUNTER — NON-APPOINTMENT (OUTPATIENT)
Age: 28
End: 2023-06-13

## 2023-06-13 LAB
ALBUMIN SERPL ELPH-MCNC: 3.8 G/DL
ALP BLD-CCNC: 70 U/L
ALT SERPL-CCNC: 19 U/L
AST SERPL-CCNC: 15 U/L
BILIRUB DIRECT SERPL-MCNC: 0.1 MG/DL
BILIRUB INDIRECT SERPL-MCNC: 0.1 MG/DL
BILIRUB SERPL-MCNC: <0.2 MG/DL
HBV E AG SER QL: NONREACTIVE
PROT SERPL-MCNC: 6.2 G/DL

## 2023-06-19 ENCOUNTER — NON-APPOINTMENT (OUTPATIENT)
Age: 28
End: 2023-06-19

## 2023-06-19 LAB
HBV DNA # SERPL NAA+PROBE: 790 IU/ML
HEPB DNA PCR INT: DETECTED
HEPB DNA PCR LOG: 2.9 LOGIU/ML

## 2023-06-22 ENCOUNTER — ASOB RESULT (OUTPATIENT)
Age: 28
End: 2023-06-22

## 2023-06-22 ENCOUNTER — APPOINTMENT (OUTPATIENT)
Dept: ANTEPARTUM | Facility: CLINIC | Age: 28
End: 2023-06-22
Payer: COMMERCIAL

## 2023-06-22 PROCEDURE — 76816 OB US FOLLOW-UP PER FETUS: CPT

## 2023-06-23 ENCOUNTER — TRANSCRIPTION ENCOUNTER (OUTPATIENT)
Age: 28
End: 2023-06-23

## 2023-06-24 ENCOUNTER — TRANSCRIPTION ENCOUNTER (OUTPATIENT)
Age: 28
End: 2023-06-24

## 2023-06-24 ENCOUNTER — INPATIENT (INPATIENT)
Facility: HOSPITAL | Age: 28
LOS: 1 days | Discharge: HOME | End: 2023-06-26
Attending: STUDENT IN AN ORGANIZED HEALTH CARE EDUCATION/TRAINING PROGRAM | Admitting: STUDENT IN AN ORGANIZED HEALTH CARE EDUCATION/TRAINING PROGRAM
Payer: COMMERCIAL

## 2023-06-24 VITALS — SYSTOLIC BLOOD PRESSURE: 125 MMHG | DIASTOLIC BLOOD PRESSURE: 74 MMHG | OXYGEN SATURATION: 98 % | HEART RATE: 113 BPM

## 2023-06-24 DIAGNOSIS — Z98.890 OTHER SPECIFIED POSTPROCEDURAL STATES: Chronic | ICD-10-CM

## 2023-06-24 DIAGNOSIS — O26.899 OTHER SPECIFIED PREGNANCY RELATED CONDITIONS, UNSPECIFIED TRIMESTER: ICD-10-CM

## 2023-06-24 LAB
ALBUMIN SERPL ELPH-MCNC: 3.5 G/DL — SIGNIFICANT CHANGE UP (ref 3.3–5)
ALP SERPL-CCNC: 68 U/L — SIGNIFICANT CHANGE UP (ref 40–120)
ALT FLD-CCNC: 18 U/L — SIGNIFICANT CHANGE UP (ref 10–45)
ANION GAP SERPL CALC-SCNC: 13 MMOL/L — SIGNIFICANT CHANGE UP (ref 5–17)
APPEARANCE UR: CLEAR — SIGNIFICANT CHANGE UP
APTT BLD: 23.6 SEC — LOW (ref 27.5–35.5)
AST SERPL-CCNC: 16 U/L — SIGNIFICANT CHANGE UP (ref 10–40)
BASOPHILS # BLD AUTO: 0.04 K/UL — SIGNIFICANT CHANGE UP (ref 0–0.2)
BASOPHILS NFR BLD AUTO: 0.4 % — SIGNIFICANT CHANGE UP (ref 0–2)
BILIRUB SERPL-MCNC: 0.2 MG/DL — SIGNIFICANT CHANGE UP (ref 0.2–1.2)
BILIRUB UR-MCNC: NEGATIVE — SIGNIFICANT CHANGE UP
BUN SERPL-MCNC: 6 MG/DL — LOW (ref 7–23)
CALCIUM SERPL-MCNC: 9.7 MG/DL — SIGNIFICANT CHANGE UP (ref 8.4–10.5)
CHLORIDE SERPL-SCNC: 105 MMOL/L — SIGNIFICANT CHANGE UP (ref 96–108)
CO2 SERPL-SCNC: 20 MMOL/L — LOW (ref 22–31)
COLOR SPEC: YELLOW — SIGNIFICANT CHANGE UP
COVID-19 SPIKE DOMAIN AB INTERP: POSITIVE
COVID-19 SPIKE DOMAIN ANTIBODY RESULT: >250 U/ML — HIGH
CREAT ?TM UR-MCNC: 24 MG/DL — SIGNIFICANT CHANGE UP
CREAT SERPL-MCNC: 0.57 MG/DL — SIGNIFICANT CHANGE UP (ref 0.5–1.3)
DIFF PNL FLD: ABNORMAL
EGFR: 126 ML/MIN/1.73M2 — SIGNIFICANT CHANGE UP
EOSINOPHIL # BLD AUTO: 0.48 K/UL — SIGNIFICANT CHANGE UP (ref 0–0.5)
EOSINOPHIL NFR BLD AUTO: 5 % — SIGNIFICANT CHANGE UP (ref 0–6)
GLUCOSE BLDC GLUCOMTR-MCNC: 100 MG/DL — HIGH (ref 70–99)
GLUCOSE BLDC GLUCOMTR-MCNC: 88 MG/DL — SIGNIFICANT CHANGE UP (ref 70–99)
GLUCOSE BLDC GLUCOMTR-MCNC: 91 MG/DL — SIGNIFICANT CHANGE UP (ref 70–99)
GLUCOSE SERPL-MCNC: 88 MG/DL — SIGNIFICANT CHANGE UP (ref 70–99)
GLUCOSE UR QL: NEGATIVE — SIGNIFICANT CHANGE UP
HCT VFR BLD CALC: 34.7 % — SIGNIFICANT CHANGE UP (ref 34.5–45)
HGB BLD-MCNC: 11.3 G/DL — LOW (ref 11.5–15.5)
IMM GRANULOCYTES NFR BLD AUTO: 1.8 % — HIGH (ref 0–0.9)
INR BLD: 0.97 RATIO — SIGNIFICANT CHANGE UP (ref 0.88–1.16)
KETONES UR-MCNC: NEGATIVE — SIGNIFICANT CHANGE UP
LDH SERPL L TO P-CCNC: 128 U/L — SIGNIFICANT CHANGE UP (ref 50–242)
LEUKOCYTE ESTERASE UR-ACNC: NEGATIVE — SIGNIFICANT CHANGE UP
LYMPHOCYTES # BLD AUTO: 1.96 K/UL — SIGNIFICANT CHANGE UP (ref 1–3.3)
LYMPHOCYTES # BLD AUTO: 20.4 % — SIGNIFICANT CHANGE UP (ref 13–44)
MCHC RBC-ENTMCNC: 27.6 PG — SIGNIFICANT CHANGE UP (ref 27–34)
MCHC RBC-ENTMCNC: 32.6 GM/DL — SIGNIFICANT CHANGE UP (ref 32–36)
MCV RBC AUTO: 84.8 FL — SIGNIFICANT CHANGE UP (ref 80–100)
MONOCYTES # BLD AUTO: 0.73 K/UL — SIGNIFICANT CHANGE UP (ref 0–0.9)
MONOCYTES NFR BLD AUTO: 7.6 % — SIGNIFICANT CHANGE UP (ref 2–14)
NEUTROPHILS # BLD AUTO: 6.23 K/UL — SIGNIFICANT CHANGE UP (ref 1.8–7.4)
NEUTROPHILS NFR BLD AUTO: 64.8 % — SIGNIFICANT CHANGE UP (ref 43–77)
NITRITE UR-MCNC: NEGATIVE — SIGNIFICANT CHANGE UP
NRBC # BLD: 0 /100 WBCS — SIGNIFICANT CHANGE UP (ref 0–0)
PH UR: 7 — SIGNIFICANT CHANGE UP (ref 5–8)
PLATELET # BLD AUTO: 284 K/UL — SIGNIFICANT CHANGE UP (ref 150–400)
POTASSIUM SERPL-MCNC: 3.9 MMOL/L — SIGNIFICANT CHANGE UP (ref 3.5–5.3)
POTASSIUM SERPL-SCNC: 3.9 MMOL/L — SIGNIFICANT CHANGE UP (ref 3.5–5.3)
PROT ?TM UR-MCNC: 24 MG/DL — HIGH (ref 0–12)
PROT SERPL-MCNC: 6.3 G/DL — SIGNIFICANT CHANGE UP (ref 6–8.3)
PROT UR-MCNC: ABNORMAL
PROT/CREAT UR-RTO: 1 RATIO — HIGH (ref 0–0.2)
PROTHROM AB SERPL-ACNC: 11.2 SEC — SIGNIFICANT CHANGE UP (ref 10.5–13.4)
RBC # BLD: 4.09 M/UL — SIGNIFICANT CHANGE UP (ref 3.8–5.2)
RBC # FLD: 14.5 % — SIGNIFICANT CHANGE UP (ref 10.3–14.5)
SARS-COV-2 IGG+IGM SERPL QL IA: >250 U/ML — HIGH
SARS-COV-2 IGG+IGM SERPL QL IA: POSITIVE
SODIUM SERPL-SCNC: 138 MMOL/L — SIGNIFICANT CHANGE UP (ref 135–145)
SP GR SPEC: 1 — LOW (ref 1.01–1.02)
T PALLIDUM AB TITR SER: NEGATIVE — SIGNIFICANT CHANGE UP
URATE SERPL-MCNC: 3.9 MG/DL — SIGNIFICANT CHANGE UP (ref 2.5–7)
UROBILINOGEN FLD QL: NEGATIVE — SIGNIFICANT CHANGE UP
WBC # BLD: 9.61 K/UL — SIGNIFICANT CHANGE UP (ref 3.8–10.5)
WBC # FLD AUTO: 9.61 K/UL — SIGNIFICANT CHANGE UP (ref 3.8–10.5)

## 2023-06-24 PROCEDURE — 88307 TISSUE EXAM BY PATHOLOGIST: CPT | Mod: 26

## 2023-06-24 DEVICE — INTERCEED 3 X 4": Type: IMPLANTABLE DEVICE | Status: FUNCTIONAL

## 2023-06-24 RX ORDER — ONDANSETRON 8 MG/1
4 TABLET, FILM COATED ORAL EVERY 6 HOURS
Refills: 0 | Status: DISCONTINUED | OUTPATIENT
Start: 2023-06-24 | End: 2023-06-25

## 2023-06-24 RX ORDER — DEXAMETHASONE 0.5 MG/5ML
4 ELIXIR ORAL EVERY 6 HOURS
Refills: 0 | Status: DISCONTINUED | OUTPATIENT
Start: 2023-06-24 | End: 2023-06-25

## 2023-06-24 RX ORDER — SODIUM CHLORIDE 9 MG/ML
1000 INJECTION INTRAMUSCULAR; INTRAVENOUS; SUBCUTANEOUS
Refills: 0 | Status: DISCONTINUED | OUTPATIENT
Start: 2023-06-24 | End: 2023-06-24

## 2023-06-24 RX ORDER — CITRIC ACID/SODIUM CITRATE 300-500 MG
15 SOLUTION, ORAL ORAL EVERY 6 HOURS
Refills: 0 | Status: DISCONTINUED | OUTPATIENT
Start: 2023-06-24 | End: 2023-06-24

## 2023-06-24 RX ORDER — SIMETHICONE 80 MG/1
80 TABLET, CHEWABLE ORAL EVERY 4 HOURS
Refills: 0 | Status: DISCONTINUED | OUTPATIENT
Start: 2023-06-24 | End: 2023-06-26

## 2023-06-24 RX ORDER — ACETAMINOPHEN 500 MG
975 TABLET ORAL
Refills: 0 | Status: DISCONTINUED | OUTPATIENT
Start: 2023-06-24 | End: 2023-06-26

## 2023-06-24 RX ORDER — KETOROLAC TROMETHAMINE 30 MG/ML
30 SYRINGE (ML) INJECTION EVERY 6 HOURS
Refills: 0 | Status: DISCONTINUED | OUTPATIENT
Start: 2023-06-24 | End: 2023-06-25

## 2023-06-24 RX ORDER — AMPICILLIN TRIHYDRATE 250 MG
2 CAPSULE ORAL ONCE
Refills: 0 | Status: COMPLETED | OUTPATIENT
Start: 2023-06-24 | End: 2023-06-24

## 2023-06-24 RX ORDER — FENTANYL CITRATE 50 UG/ML
25 INJECTION INTRAVENOUS
Refills: 0 | Status: DISCONTINUED | OUTPATIENT
Start: 2023-06-24 | End: 2023-06-26

## 2023-06-24 RX ORDER — OXYTOCIN 10 UNIT/ML
4 VIAL (ML) INJECTION
Qty: 30 | Refills: 0 | Status: DISCONTINUED | OUTPATIENT
Start: 2023-06-24 | End: 2023-06-26

## 2023-06-24 RX ORDER — DIPHENHYDRAMINE HCL 50 MG
25 CAPSULE ORAL EVERY 6 HOURS
Refills: 0 | Status: COMPLETED | OUTPATIENT
Start: 2023-06-24 | End: 2024-05-22

## 2023-06-24 RX ORDER — AMPICILLIN TRIHYDRATE 250 MG
1 CAPSULE ORAL EVERY 4 HOURS
Refills: 0 | Status: DISCONTINUED | OUTPATIENT
Start: 2023-06-24 | End: 2023-06-24

## 2023-06-24 RX ORDER — HEPARIN SODIUM 5000 [USP'U]/ML
5000 INJECTION INTRAVENOUS; SUBCUTANEOUS EVERY 12 HOURS
Refills: 0 | Status: DISCONTINUED | OUTPATIENT
Start: 2023-06-24 | End: 2023-06-26

## 2023-06-24 RX ORDER — MAGNESIUM HYDROXIDE 400 MG/1
30 TABLET, CHEWABLE ORAL
Refills: 0 | Status: DISCONTINUED | OUTPATIENT
Start: 2023-06-24 | End: 2023-06-26

## 2023-06-24 RX ORDER — DIPHENHYDRAMINE HCL 50 MG
25 CAPSULE ORAL EVERY 6 HOURS
Refills: 0 | Status: DISCONTINUED | OUTPATIENT
Start: 2023-06-24 | End: 2023-06-26

## 2023-06-24 RX ORDER — FENTANYL CITRATE 50 UG/ML
50 INJECTION INTRAVENOUS
Refills: 0 | Status: DISCONTINUED | OUTPATIENT
Start: 2023-06-24 | End: 2023-06-26

## 2023-06-24 RX ORDER — OXYCODONE HYDROCHLORIDE 5 MG/1
5 TABLET ORAL ONCE
Refills: 0 | Status: DISCONTINUED | OUTPATIENT
Start: 2023-06-24 | End: 2023-06-26

## 2023-06-24 RX ORDER — NALOXONE HYDROCHLORIDE 4 MG/.1ML
0.1 SPRAY NASAL
Refills: 0 | Status: DISCONTINUED | OUTPATIENT
Start: 2023-06-24 | End: 2023-06-25

## 2023-06-24 RX ORDER — OXYCODONE HYDROCHLORIDE 5 MG/1
5 TABLET ORAL
Refills: 0 | Status: DISCONTINUED | OUTPATIENT
Start: 2023-06-24 | End: 2023-06-25

## 2023-06-24 RX ORDER — OXYTOCIN 10 UNIT/ML
333.33 VIAL (ML) INJECTION
Qty: 20 | Refills: 0 | Status: COMPLETED | OUTPATIENT
Start: 2023-06-24 | End: 2023-06-24

## 2023-06-24 RX ORDER — MORPHINE SULFATE 50 MG/1
2 CAPSULE, EXTENDED RELEASE ORAL ONCE
Refills: 0 | Status: DISCONTINUED | OUTPATIENT
Start: 2023-06-24 | End: 2023-06-25

## 2023-06-24 RX ORDER — SODIUM CHLORIDE 9 MG/ML
500 INJECTION, SOLUTION INTRAVENOUS ONCE
Refills: 0 | Status: COMPLETED | OUTPATIENT
Start: 2023-06-24 | End: 2023-06-24

## 2023-06-24 RX ORDER — ACETAMINOPHEN 500 MG
1000 TABLET ORAL ONCE
Refills: 0 | Status: DISCONTINUED | OUTPATIENT
Start: 2023-06-24 | End: 2023-06-26

## 2023-06-24 RX ORDER — OXYCODONE HYDROCHLORIDE 5 MG/1
10 TABLET ORAL
Refills: 0 | Status: DISCONTINUED | OUTPATIENT
Start: 2023-06-24 | End: 2023-06-25

## 2023-06-24 RX ORDER — OXYCODONE HYDROCHLORIDE 5 MG/1
5 TABLET ORAL
Refills: 0 | Status: DISCONTINUED | OUTPATIENT
Start: 2023-06-24 | End: 2023-06-26

## 2023-06-24 RX ORDER — CHLORHEXIDINE GLUCONATE 213 G/1000ML
1 SOLUTION TOPICAL DAILY
Refills: 0 | Status: DISCONTINUED | OUTPATIENT
Start: 2023-06-24 | End: 2023-06-24

## 2023-06-24 RX ORDER — NALBUPHINE HYDROCHLORIDE 10 MG/ML
2.5 INJECTION, SOLUTION INTRAMUSCULAR; INTRAVENOUS; SUBCUTANEOUS EVERY 6 HOURS
Refills: 0 | Status: DISCONTINUED | OUTPATIENT
Start: 2023-06-24 | End: 2023-06-26

## 2023-06-24 RX ORDER — SODIUM CHLORIDE 9 MG/ML
1000 INJECTION, SOLUTION INTRAVENOUS
Refills: 0 | Status: DISCONTINUED | OUTPATIENT
Start: 2023-06-24 | End: 2023-06-24

## 2023-06-24 RX ORDER — IBUPROFEN 200 MG
600 TABLET ORAL EVERY 6 HOURS
Refills: 0 | Status: COMPLETED | OUTPATIENT
Start: 2023-06-24 | End: 2024-05-22

## 2023-06-24 RX ORDER — LABETALOL HCL 100 MG
200 TABLET ORAL EVERY 12 HOURS
Refills: 0 | Status: DISCONTINUED | OUTPATIENT
Start: 2023-06-24 | End: 2023-06-26

## 2023-06-24 RX ORDER — SODIUM CHLORIDE 9 MG/ML
1000 INJECTION, SOLUTION INTRAVENOUS
Refills: 0 | Status: DISCONTINUED | OUTPATIENT
Start: 2023-06-24 | End: 2023-06-26

## 2023-06-24 RX ORDER — LANOLIN
1 OINTMENT (GRAM) TOPICAL EVERY 6 HOURS
Refills: 0 | Status: DISCONTINUED | OUTPATIENT
Start: 2023-06-24 | End: 2023-06-26

## 2023-06-24 RX ORDER — TETANUS TOXOID, REDUCED DIPHTHERIA TOXOID AND ACELLULAR PERTUSSIS VACCINE, ADSORBED 5; 2.5; 8; 8; 2.5 [IU]/.5ML; [IU]/.5ML; UG/.5ML; UG/.5ML; UG/.5ML
0.5 SUSPENSION INTRAMUSCULAR ONCE
Refills: 0 | Status: DISCONTINUED | OUTPATIENT
Start: 2023-06-24 | End: 2023-06-26

## 2023-06-24 RX ORDER — OXYTOCIN 10 UNIT/ML
333.33 VIAL (ML) INJECTION
Qty: 20 | Refills: 0 | Status: DISCONTINUED | OUTPATIENT
Start: 2023-06-24 | End: 2023-06-26

## 2023-06-24 RX ORDER — HYDROMORPHONE HYDROCHLORIDE 2 MG/ML
1 INJECTION INTRAMUSCULAR; INTRAVENOUS; SUBCUTANEOUS ONCE
Refills: 0 | Status: DISCONTINUED | OUTPATIENT
Start: 2023-06-24 | End: 2023-06-26

## 2023-06-24 RX ADMIN — SODIUM CHLORIDE 125 MILLILITER(S): 9 INJECTION, SOLUTION INTRAVENOUS at 09:57

## 2023-06-24 RX ADMIN — Medication 30 MILLIGRAM(S): at 21:20

## 2023-06-24 RX ADMIN — Medication 1000 MILLIUNIT(S)/MIN: at 14:12

## 2023-06-24 RX ADMIN — SODIUM CHLORIDE 500 MILLILITER(S): 9 INJECTION, SOLUTION INTRAVENOUS at 09:57

## 2023-06-24 RX ADMIN — ONDANSETRON 4 MILLIGRAM(S): 8 TABLET, FILM COATED ORAL at 18:34

## 2023-06-24 RX ADMIN — Medication 200 GRAM(S): at 03:09

## 2023-06-24 RX ADMIN — SODIUM CHLORIDE 125 MILLILITER(S): 9 INJECTION INTRAMUSCULAR; INTRAVENOUS; SUBCUTANEOUS at 09:57

## 2023-06-24 RX ADMIN — Medication 25 MILLIGRAM(S): at 16:57

## 2023-06-24 RX ADMIN — HEPARIN SODIUM 5000 UNIT(S): 5000 INJECTION INTRAVENOUS; SUBCUTANEOUS at 18:34

## 2023-06-24 RX ADMIN — Medication 108 GRAM(S): at 12:20

## 2023-06-24 RX ADMIN — Medication 15 MILLILITER(S): at 14:11

## 2023-06-24 RX ADMIN — Medication 200 MILLIGRAM(S): at 18:37

## 2023-06-24 RX ADMIN — Medication 30 MILLIGRAM(S): at 20:54

## 2023-06-24 RX ADMIN — Medication 4 MILLIGRAM(S): at 20:23

## 2023-06-24 RX ADMIN — Medication 4 MILLIUNIT(S)/MIN: at 10:25

## 2023-06-24 RX ADMIN — Medication 108 GRAM(S): at 07:16

## 2023-06-24 RX ADMIN — NALBUPHINE HYDROCHLORIDE 2.5 MILLIGRAM(S): 10 INJECTION, SOLUTION INTRAMUSCULAR; INTRAVENOUS; SUBCUTANEOUS at 20:54

## 2023-06-24 NOTE — OB PROVIDER DELIVERY SUMMARY - NSCERVICA DILATATIONATCSA_OBGYN_ALL_OB_NU
Patient presents for her PN 1, with her  at 13w0d  Planned pregnancy  [id number]  # 1 - Date: 12/10/14, Sex: Male, Weight: 3544 g (7 lb 13 oz), GA: 39w3d, Delivery: , Low Transverse, Apgar1: None, Apgar5: None, Living: Living, Birth Comments: None    # 2 - Date: 17, Sex: None, Weight: None, GA: None, Delivery: Spontaneous , Apgar1: None, Apgar5: None, Living: None, Birth Comments: None    # 3 - Date: 18, Sex: Male, Weight: 3714 g (8 lb 3 oz), GA: 40w3d, Delivery: , Low Transverse, Apgar1: 9, Apgar5: 9, Living: Living, Birth Comments: None    # 4 - Date: None, Sex: None, Weight: None, GA: None, Delivery: None, Apgar1: None, Apgar5: None, Living: None, Birth Comments: None    Past Medical History:   Diagnosis Date    Abnormal Pap smear of cervix     , spontaneous complete 2017    Transitioned From: Spontaneous , incomplete    Depression     resolved    HPV (human papilloma virus) infection     Migraine     occas  takes med  prn    UTI (urinary tract infection) 2016     Past Surgical History:   Procedure Laterality Date     SECTION      2014 CPD?  COLPOSCOPY      2016    [place]  DELIVERY ONLY N/A 2018    Procedure:  SECTION () REPEAT;  Surgeon: Mini Figueroa MD;  Location: [facility] ;  Service: Obstetrics    WISDOM TOOTH EXTRACTION Bilateral        Denies OB complaints  Denies pelvic pain, bleeding, LOF  Exam WNL   by Doppler  Patient has completed prenatal labs, although this was done at [facility] and results are not available today  Will call [facility] to have results faxed to our office for review  Pap up to date  Last pap 18 neg/neg  Gc/chl sent today  Patient is not scheduled for sequential screening at [facility]  Discussed this along with [id number] testing  [facility] referral placed and will call today  She is scheduled for 20 week ultrasound with M  Continue PNV    Reviewed diet and activity recommendations, practice set up, visit intervals and reasons to call  RTO in 4 weeks  3

## 2023-06-24 NOTE — OB NEONATOLOGY/PEDIATRICIAN DELIVERY SUMMARY - NSPEDSNEONOTESA_OBGYN_ALL_OB_FT
Requested by OB to attend this primary  at 34.4 weeks.  Mother is a 29 year old, , blood typeA pos.  Prenatal labs as follow: HIV neg, RPR non-reactive, rubella immune, chronic Hep B, HBsA neg, GBS unk on ---- received ampicillin X3 dose. Prenatal history significant for fetal demise X1. This pregnancy was uncomplicated. PROM at 23:00  with clear fluid 14  hours prior to delivery.  Infant emerged vertex, CAN X1. Delayed cord clamping X 25 secs, then brought to warmer. Dried, suctioned and stimulated.  Apgars8/9. Mom wishes to breast/bottle feed. Consents to Hep B vaccine. Consents to circumcision. Infant admitted to NICU for further management of care. Mother updated. Requested by OB to attend this primary  at 34.4 weeks.  Mother is a 29 year old, , blood typeA pos.  Prenatal labs as follow: HIV neg, RPR non-reactive, rubella immune, chronic Hep B, HBsA neg, GBS unknown. Mom received ampicillin X3 dose. Prenatal history significant for fetal demise X1. This pregnancy was uncomplicated. PROM at 23:00 on 23 with clear fluid 14 hours prior to delivery.  Infant emerged vertex, CAN X1. Delayed cord clamping X 25 secs, alert, nonvigorous, then brought to warmer. Dried, suctioned and stimulated.  Apgars 8/9. Mom wishes to breast/bottle feed. Consents to Hep B vaccine. Consents to circumcision. Infant admitted to NICU for further management of care. Mother updated. Requested by OB to attend this primary  at 34.4 weeks.  Mother is a 29 year old, , blood typeA pos.  Prenatal labs as follow: HIV neg, RPR non-reactive, rubella immune, chronic Hep B, HBsA neg, GBS unknown. Mom received ampicillin X3 dose. Prenatal history significant for fetal demise X1. This pregnancy was complicated by gHTN, GDM, Chronic Hep B. Mother was treated with antiviral ear;ier in pregnancy.  PROM at 23:00 on 23 with clear fluid 14 hours prior to delivery.  Infant emerged vertex, CAN X1. Delayed cord clamping X 25 secs, alert, nonvigorous, then brought to warmer. Dried, suctioned and stimulated.  Apgars 8/9. Mom wishes to breast. Agreed to bottle feed if needed in NICU. Consents to Hep B vaccine. Consents to circumcision. Infant admitted to NICU for further management of care. Mother updated.

## 2023-06-24 NOTE — DISCHARGE NOTE OB - HOSPITAL COURSE
Patient admitted with PPROM, cerclage removed, induction started however patient with Cat 2 tracing.  Pt had an uncomplicated  delivery.  Patient had an unremarkable postoperative course and was stable for discharge home on postoperative day 3. Patient admitted with PPROM, cerclage removed, induction started however patient with Cat 2 tracing.  Pt had an uncomplicated  delivery.  Patient had an unremarkable postoperative course and was stable for discharge home on postoperative day 2.

## 2023-06-24 NOTE — OB PROVIDER LABOR PROGRESS NOTE - NS_SUBJECTIVE/OBJECTIVE_OBGYN_ALL_OB_FT
OB labor progress    Exam unchanged   Pt had trial of pitocin but had to stop due to deceleration.  Tracing now with minimal variability and occasional variable decelerations.  Given category 2 tracing and inability to tolerate pitocin, recommend primary  section.  Risk of bleeding, infection and damage to surrounding organs discussed.  All questions answered, nursing and anesthesia aware.     Carline Ward MD
Pt seen at bedside s/p epidural. Speculum placed in vagina and cerclage knot grasped with a ring forcep and the cerclage was cut and removed entirely. After removal exam 2/60/-3

## 2023-06-24 NOTE — DISCHARGE NOTE OB - MEDICATION SUMMARY - MEDICATIONS TO TAKE
I will START or STAY ON the medications listed below when I get home from the hospital:    ibuprofen 600 mg oral tablet  -- 1 tab(s) by mouth every 6 hours  -- Indication: For PAIN    acetaminophen 500 mg oral tablet  -- 2 tab(s) by mouth every 6 hours  -- Indication: For PAIN    oxyCODONE 5 mg oral tablet  -- 1 tab(s) by mouth every 3 hours As needed Moderate to Severe Pain (4-10)  -- Indication: For PAIN

## 2023-06-24 NOTE — OB RN PATIENT PROFILE - NS_OBGYNHISTORY_OBGYN_ALL_OB_FT
stillbirth 2022  gHTN  GDM - diet controlled  cerclage MAB x1 2022  gHTN  GDM - diet controlled  cerclage in place SAB x1 2022  gHTN  GDM - diet controlled  cerclage in place

## 2023-06-24 NOTE — OB PROVIDER DELIVERY SUMMARY - NSSELHIDDEN_OBGYN_ALL_OB_FT
[NS_DeliveryAttending1_OBGYN_ALL_OB_FT:[id number]],[NS_DeliveryAssist1_OBGYN_ALL_OB_FT:[id number]],[NS_DeliveryRN_OBGYN_ALL_OB_FT:[id number]]

## 2023-06-24 NOTE — DISCHARGE NOTE OB - CARE PROVIDER_API CALL
Carline Ward  Obstetrics and Gynecology  [address], [address]  [place] [address]  Phone: [phone]  Fax: [phone]  Follow Up Time: 2 weeks

## 2023-06-24 NOTE — DISCHARGE NOTE OB - NS MD DC FALL RISK RISK
For information on Fall & Injury Prevention, visit: [email or web address] OR  [email or web address] OR  https://www.cdc.gov/steadi/patient.html

## 2023-06-24 NOTE — OB PROVIDER H&P - ATTENDING COMMENTS
Pt seen with resident for cerclage removal. Pt was unable to tolerate exam. On exam cerclage without tension. Will give epidural and then reattempt removal.     Elena Hollis, DO

## 2023-06-24 NOTE — DISCHARGE NOTE OB - CARE PLAN
1 Principal Discharge DX:	 delivery delivered  Assessment and plan of treatment:	After discharge, please stay on pelvic rest for 6 weeks, meaning no sexual intercourse, no tampons and no douching.  No driving for 2 weeks as women can loose a lot of blood during delivery and there is a possibility of being lightheaded/fainting.  No lifting objects heavier than baby for two weeks.  Expect to have vaginal bleeding/spotting for up to six weeks.  The bleeding should get lighter and more white/light brown with time.  For bleeding soaking more than a pad an hour or passing clots greater than the size of your fist, come in to the emergency department.    Follow up in the office in 2 weeks for incision check.  Call your OBGYN for noticeable increase in redness or swelling at incision, discharge from incision, or opening of skin at incision site.  Secondary Diagnosis:	Chronic hypertension  Assessment and plan of treatment:	Continue to take your blood pressure at least twice a day, and take your medications as prescribed.  Call your OB if your pressures are greater than 150/100, or if you experience severe or persistent headaches, visual changes, persistent nausea/vomiting, trouble breathing, chest pain, or severe abdominal pain. Please follow up with your OBGYN within 1 week to review your blood pressures.

## 2023-06-24 NOTE — OB PROVIDER LABOR PROGRESS NOTE - NS_OBIHIFHRDETAILS_OBGYN_ALL_OB_FT
cat 1 overall. after epidural had two small decelerations when BP low which have since resolved with resolution of BP

## 2023-06-24 NOTE — OB PROVIDER H&P - ASSESSMENT
Assessment  27y  at 34w4d presents for  rupture of membranes.    Plan  1. Admit to L+D. Routine Labs. IVF. Cerclage removal.  2. TBD plan following cerclage removal.  3. Fetus: cat 1 tracing. VTX. EFW 2241g by sono on . Continuous EFM. Sono. No concerns.  4. Prenatal issues: cHTN on Labetalol 200BID, HELLP labs on admission  5. GBS unknown, for Amp prophylaxis due to .  6. Pain: IV pain meds/epidural PRN    Plan per attending physician, Dr. Telma Ponce MD  PGY2

## 2023-06-24 NOTE — OB RN DELIVERY SUMMARY - NS_SEPSISRSKCALC_OBGYN_ALL_OB_FT
Rupture of membranes must be entered above.   EOS calculated successfully. EOS Risk Factor: 0.5/1000 live births ([facility] national incidence); GA=34w4d; Temp=98.78; ROM=14.567; GBS='Unknown'; Antibiotics='Broad spectrum antibiotics 2-3.9 hrs prior to birth'

## 2023-06-24 NOTE — OB RN PATIENT PROFILE - FALL HARM RISK - UNIVERSAL INTERVENTIONS
Bed in lowest position, wheels locked, appropriate side rails in place/Call bell, personal items and telephone in reach/Instruct patient to call for assistance before getting out of bed or chair/Non-slip footwear when patient is out of bed/[place] to call system/Physically safe environment - no spills, clutter or unnecessary equipment/Purposeful Proactive Rounding/Room/bathroom lighting operational, light cord in reach

## 2023-06-24 NOTE — OB PROVIDER DELIVERY SUMMARY - NSPROVIDERDELIVERYNOTE_OBGYN_ALL_OB_FT
Unscheduled urgent pLTCS for Cat 2 FHT  Viable male infant, apgars 8/9, weight 2070g  Hysterotomy closed in 1 layer using Caprosyn  Interceed placed over hysterotomy   Grossly normal uterus, tubes, and ovaries  Abdomen closed in standard fashion  Pt and infant to recovery in stable condition  Placenta to pathology  EBL: 700   IVF: 1100   UOP: 350 Unscheduled urgent pLTCS for Cat 2 FHT  Viable male infant, apgars 8/9, weight 2070g  Hysterotomy closed in 1 layer using Caprosyn  Interceed placed over hysterotomy   Grossly normal uterus, tubes, and ovaries  Abdomen closed in standard fashion  Pt to recovery in stable condition  Baby to NICU  Placenta to pathology  EBL: 700   IVF: 1100   UOP: 350

## 2023-06-24 NOTE — OB PROVIDER H&P - HISTORY OF PRESENT ILLNESS
R2 Admission H&P    Subjective  HPI: 27y  at 34w4d presents for leakage of fluid since 11pm +FM. +LOF. -CTXs. -VB. Pt denies any other concerns.    Patient states that she started having leakage of fluid starting at 11pm that gradually increased, needing to use a diaper that was soaked. States first leakage of fluid was clear. Denies abdominal pain, nausea/vomiting, fevers/chills.    This pregnancy had Emerson cerclage placed for history and shortened cervix on 23. Of note patient has a history of an IUFD in 2023 at 30w6d due to suspected DIC and concern for preeclampsia with severe features.    ATU scan : cephalic, anterior placenta, ANGEL 16.55 BPP , EFW 2251g    – PNC: GDMA1. GBS unknown. EFW 2251g by sono .  – OBHx: IUFD@30w6d suspected DIC c/b sPEC/Mg, SABx1  – GynHx: denies fibroids, cysts, endometriosis, abnormal pap smears, STIs  – PMH: HTN, chronic Hep B  – PSH: cerclage placement , cerclage placement 23  – Psych: denies   – Social: denies   – Meds: PNV, Folic Acid, bASA, Labetalol 200BID  – Allergies: NKDA  – Will accept blood transfusions? Yes    Objective  – VS  T(C): 37 (23 @ 01:13)  HR: 111 (23 @ 01:21)  BP: 125/74 (23 @ 01:13)  RR: 18 (23 @ 01:13)  SpO2: 97% (23 @ 01:21)    Physical Exam  CV: RRR  Pulm: breathing comfortably on RA  Abd: gravid, nontender  Extr: moving all extremities with ease    – Spec: (+)pooling/nitrazine; grossly ruptured  – VE: cerclage in place  – FHT: baseline 130, mod variability, +accels, -decels; discontinuous but reassuring  – [place]: quiescent  – EFW: 2251g by sono  – Sono: cephalic, anterior placenta R2 Admission H&P    Subjective  HPI: 27y  at 34w4d presents for leakage of fluid since 11pm +FM. +LOF. -CTXs. -VB. Pt denies any other concerns.    Patient states that she started having leakage of fluid starting at 11pm that gradually increased, needing to use a diaper that was soaked. States first leakage of fluid was clear. Denies abdominal pain, nausea/vomiting, fevers/chills.    This pregnancy had Emerson cerclage placed for history and shortened cervix on 23. Of note patient has a history of an IUFD in 2023 at 30w6d due to suspected DIC and concern for preeclampsia with severe features.    ATU scan : cephalic, anterior placenta, ANGEL 16.55 BPP , EFW 2251g    – PNC: GDMA1. GBS unknown. EFW 2251g by sono .  – OBHx: IUFD@30w6d suspected DIC c/b sPEC/Mg, SABx1  – GynHx: denies fibroids, cysts, endometriosis, abnormal pap smears, STIs  – PMH: HTN, chronic Hep B  – PSH: cerclage placement , cerclage placement 23  – Psych: denies   – Social: denies   – Meds: PNV, Folic Acid, bASA, Labetalol 200BID  – Allergies: NKDA  – Will accept blood transfusions? Yes    Objective  – VS  T(C): 37 (23 @ 01:13)  HR: 111 (23 @ 01:21)  BP: 125/74 (23 @ 01:13)  RR: 18 (23 @ 01:13)  SpO2: 97% (23 @ 01:21)    Physical Exam  CV: RRR  Pulm: breathing comfortably on RA  Abd: gravid, nontender  Extr: moving all extremities with ease    – Spec: (+)pooling/nitrazine; grossly ruptured  – VE: cerclage in place no tension on cerclage  – FHT: baseline 130, mod variability, +accels, -decels; discontinuous but reassuring  – [place]: quiescent  – EFW: 2251g by sono  – Sono: cephalic, anterior placenta

## 2023-06-24 NOTE — OB RN INTRAOPERATIVE NOTE - NSSELHIDDEN_OBGYN_ALL_OB_FT
[NS_DeliveryAttending1_OBGYN_ALL_OB_FT:[id number]],[NS_DeliveryAssist1_OBGYN_ALL_OB_FT:[id number]],[NS_DeliveryRN_OBGYN_ALL_OB_FT:[id number]]

## 2023-06-24 NOTE — OB PROVIDER DELIVERY SUMMARY - NS_DELIVERYASSIST1_OBGYN_ALL_OB_FT
Unique Echeverria MD Dapsone Pregnancy And Lactation Text: This medication is Pregnancy Category C and is not considered safe during pregnancy or breast feeding.

## 2023-06-24 NOTE — OB PROVIDER H&P - NS_OBGYNHISTORY_OBGYN_ALL_OB_FT
stillbirth 2022  gHTN  GDM - diet controlled  cerclage IUFD 2022  gHTN  GDM - diet controlled  cerclage

## 2023-06-24 NOTE — OB NEONATOLOGY/PEDIATRICIAN DELIVERY SUMMARY - NSLABSCHECK_OBGYN_ALL_OB

## 2023-06-24 NOTE — DISCHARGE NOTE OB - PLAN OF CARE
Continue to take your blood pressure at least twice a day, and take your medications as prescribed.  Call your OB if your pressures are greater than 150/100, or if you experience severe or persistent headaches, visual changes, persistent nausea/vomiting, trouble breathing, chest pain, or severe abdominal pain. Please follow up with your OBGYN within 1 week to review your blood pressures. After discharge, please stay on pelvic rest for 6 weeks, meaning no sexual intercourse, no tampons and no douching.  No driving for 2 weeks as women can loose a lot of blood during delivery and there is a possibility of being lightheaded/fainting.  No lifting objects heavier than baby for two weeks.  Expect to have vaginal bleeding/spotting for up to six weeks.  The bleeding should get lighter and more white/light brown with time.  For bleeding soaking more than a pad an hour or passing clots greater than the size of your fist, come in to the emergency department.    Follow up in the office in 2 weeks for incision check.  Call your OBGYN for noticeable increase in redness or swelling at incision, discharge from incision, or opening of skin at incision site.

## 2023-06-24 NOTE — DISCHARGE NOTE OB - PATIENT PORTAL LINK FT
You can access the FollowMyHealth Patient Portal offered by [facility] by registering at the following website: [email or web address]. By joining [employer]’s FollowMyHealth portal, you will also be able to view your health information using other applications (apps) compatible with our system.

## 2023-06-24 NOTE — OB RN PATIENT PROFILE - FUNCTIONAL SCREEN CURRENT LEVEL: COMMUNICATION, MLM
----- Message from Marlin Ayala PA-C sent at 6/5/2023  8:18 PM EDT -----  Please call flavia, before we start July dose of wegovy I would like to see her for a check up 0 = understands/communicates without difficulty

## 2023-06-24 NOTE — OB RN DELIVERY SUMMARY - NSSELHIDDEN_OBGYN_ALL_OB_FT
[NS_DeliveryAttending1_OBGYN_ALL_OB_FT:[id number]],[NS_DeliveryAssist1_OBGYN_ALL_OB_FT:[id number]],[NS_DeliveryRN_OBGYN_ALL_OB_FT:[id number]]

## 2023-06-25 LAB
BASOPHILS # BLD AUTO: 0.02 K/UL — SIGNIFICANT CHANGE UP (ref 0–0.2)
BASOPHILS NFR BLD AUTO: 0.2 % — SIGNIFICANT CHANGE UP (ref 0–2)
EOSINOPHIL # BLD AUTO: 0.02 K/UL — SIGNIFICANT CHANGE UP (ref 0–0.5)
EOSINOPHIL NFR BLD AUTO: 0.2 % — SIGNIFICANT CHANGE UP (ref 0–6)
HCT VFR BLD CALC: 32.2 % — LOW (ref 34.5–45)
HGB BLD-MCNC: 10.5 G/DL — LOW (ref 11.5–15.5)
IMM GRANULOCYTES NFR BLD AUTO: 1.3 % — HIGH (ref 0–0.9)
LYMPHOCYTES # BLD AUTO: 1.14 K/UL — SIGNIFICANT CHANGE UP (ref 1–3.3)
LYMPHOCYTES # BLD AUTO: 9.4 % — LOW (ref 13–44)
MCHC RBC-ENTMCNC: 27.7 PG — SIGNIFICANT CHANGE UP (ref 27–34)
MCHC RBC-ENTMCNC: 32.6 GM/DL — SIGNIFICANT CHANGE UP (ref 32–36)
MCV RBC AUTO: 85 FL — SIGNIFICANT CHANGE UP (ref 80–100)
MONOCYTES # BLD AUTO: 0.54 K/UL — SIGNIFICANT CHANGE UP (ref 0–0.9)
MONOCYTES NFR BLD AUTO: 4.5 % — SIGNIFICANT CHANGE UP (ref 2–14)
NEUTROPHILS # BLD AUTO: 10.22 K/UL — HIGH (ref 1.8–7.4)
NEUTROPHILS NFR BLD AUTO: 84.4 % — HIGH (ref 43–77)
NRBC # BLD: 0 /100 WBCS — SIGNIFICANT CHANGE UP (ref 0–0)
PLATELET # BLD AUTO: 254 K/UL — SIGNIFICANT CHANGE UP (ref 150–400)
RBC # BLD: 3.79 M/UL — LOW (ref 3.8–5.2)
RBC # FLD: 14.4 % — SIGNIFICANT CHANGE UP (ref 10.3–14.5)
WBC # BLD: 12.1 K/UL — HIGH (ref 3.8–10.5)
WBC # FLD AUTO: 12.1 K/UL — HIGH (ref 3.8–10.5)

## 2023-06-25 RX ORDER — IBUPROFEN 200 MG
600 TABLET ORAL EVERY 6 HOURS
Refills: 0 | Status: DISCONTINUED | OUTPATIENT
Start: 2023-06-25 | End: 2023-06-26

## 2023-06-25 RX ADMIN — Medication 975 MILLIGRAM(S): at 06:15

## 2023-06-25 RX ADMIN — Medication 975 MILLIGRAM(S): at 18:53

## 2023-06-25 RX ADMIN — Medication 600 MILLIGRAM(S): at 20:34

## 2023-06-25 RX ADMIN — Medication 200 MILLIGRAM(S): at 18:23

## 2023-06-25 RX ADMIN — Medication 975 MILLIGRAM(S): at 13:30

## 2023-06-25 RX ADMIN — Medication 975 MILLIGRAM(S): at 23:07

## 2023-06-25 RX ADMIN — Medication 200 MILLIGRAM(S): at 05:53

## 2023-06-25 RX ADMIN — Medication 975 MILLIGRAM(S): at 05:47

## 2023-06-25 RX ADMIN — Medication 600 MILLIGRAM(S): at 20:18

## 2023-06-25 RX ADMIN — Medication 600 MILLIGRAM(S): at 16:24

## 2023-06-25 RX ADMIN — Medication 30 MILLIGRAM(S): at 03:14

## 2023-06-25 RX ADMIN — Medication 975 MILLIGRAM(S): at 02:02

## 2023-06-25 RX ADMIN — Medication 600 MILLIGRAM(S): at 15:54

## 2023-06-25 RX ADMIN — HEPARIN SODIUM 5000 UNIT(S): 5000 INJECTION INTRAVENOUS; SUBCUTANEOUS at 18:23

## 2023-06-25 RX ADMIN — Medication 975 MILLIGRAM(S): at 18:23

## 2023-06-25 RX ADMIN — Medication 975 MILLIGRAM(S): at 13:00

## 2023-06-25 RX ADMIN — Medication 30 MILLIGRAM(S): at 03:44

## 2023-06-25 RX ADMIN — Medication 975 MILLIGRAM(S): at 01:07

## 2023-06-25 RX ADMIN — HEPARIN SODIUM 5000 UNIT(S): 5000 INJECTION INTRAVENOUS; SUBCUTANEOUS at 05:53

## 2023-06-25 NOTE — PROGRESS NOTE ADULT - ATTENDING COMMENTS
POD1 s/p 1'  section, doing well  -Routine postoperative care   -H/H appropriate   -Reg diet   -DC karley Ward MD

## 2023-06-26 ENCOUNTER — NON-APPOINTMENT (OUTPATIENT)
Age: 28
End: 2023-06-26

## 2023-06-26 ENCOUNTER — APPOINTMENT (OUTPATIENT)
Dept: MATERNAL FETAL MEDICINE | Facility: CLINIC | Age: 28
End: 2023-06-26

## 2023-06-26 VITALS
DIASTOLIC BLOOD PRESSURE: 78 MMHG | SYSTOLIC BLOOD PRESSURE: 119 MMHG | TEMPERATURE: 99 F | RESPIRATION RATE: 18 BRPM | OXYGEN SATURATION: 95 % | HEART RATE: 98 BPM

## 2023-06-26 PROCEDURE — 82570 ASSAY OF URINE CREATININE: CPT

## 2023-06-26 PROCEDURE — 85384 FIBRINOGEN ACTIVITY: CPT

## 2023-06-26 PROCEDURE — 88307 TISSUE EXAM BY PATHOLOGIST: CPT

## 2023-06-26 PROCEDURE — 36415 COLL VENOUS BLD VENIPUNCTURE: CPT

## 2023-06-26 PROCEDURE — 82962 GLUCOSE BLOOD TEST: CPT

## 2023-06-26 PROCEDURE — 84550 ASSAY OF BLOOD/URIC ACID: CPT

## 2023-06-26 PROCEDURE — 81001 URINALYSIS AUTO W/SCOPE: CPT

## 2023-06-26 PROCEDURE — 80053 COMPREHEN METABOLIC PANEL: CPT

## 2023-06-26 PROCEDURE — 86850 RBC ANTIBODY SCREEN: CPT

## 2023-06-26 PROCEDURE — 86769 SARS-COV-2 COVID-19 ANTIBODY: CPT

## 2023-06-26 PROCEDURE — 85730 THROMBOPLASTIN TIME PARTIAL: CPT

## 2023-06-26 PROCEDURE — 86780 TREPONEMA PALLIDUM: CPT

## 2023-06-26 PROCEDURE — 83615 LACTATE (LD) (LDH) ENZYME: CPT

## 2023-06-26 PROCEDURE — 85025 COMPLETE CBC W/AUTO DIFF WBC: CPT

## 2023-06-26 PROCEDURE — 86901 BLOOD TYPING SEROLOGIC RH(D): CPT

## 2023-06-26 PROCEDURE — 86900 BLOOD TYPING SEROLOGIC ABO: CPT

## 2023-06-26 PROCEDURE — 59050 FETAL MONITOR W/REPORT: CPT

## 2023-06-26 PROCEDURE — C1765: CPT

## 2023-06-26 PROCEDURE — 84156 ASSAY OF PROTEIN URINE: CPT

## 2023-06-26 PROCEDURE — 59025 FETAL NON-STRESS TEST: CPT

## 2023-06-26 PROCEDURE — 85610 PROTHROMBIN TIME: CPT

## 2023-06-26 RX ORDER — ACETAMINOPHEN 500 MG
2 TABLET ORAL
Qty: 0 | Refills: 0 | DISCHARGE
Start: 2023-06-26

## 2023-06-26 RX ORDER — OXYCODONE HYDROCHLORIDE 5 MG/1
1 TABLET ORAL
Qty: 0 | Refills: 0 | DISCHARGE
Start: 2023-06-26

## 2023-06-26 RX ORDER — IBUPROFEN 200 MG
1 TABLET ORAL
Qty: 0 | Refills: 0 | DISCHARGE
Start: 2023-06-26

## 2023-06-26 RX ADMIN — Medication 975 MILLIGRAM(S): at 19:04

## 2023-06-26 RX ADMIN — Medication 200 MILLIGRAM(S): at 05:32

## 2023-06-26 RX ADMIN — Medication 975 MILLIGRAM(S): at 12:54

## 2023-06-26 RX ADMIN — Medication 600 MILLIGRAM(S): at 08:23

## 2023-06-26 RX ADMIN — Medication 600 MILLIGRAM(S): at 02:09

## 2023-06-26 RX ADMIN — Medication 200 MILLIGRAM(S): at 19:04

## 2023-06-26 RX ADMIN — HEPARIN SODIUM 5000 UNIT(S): 5000 INJECTION INTRAVENOUS; SUBCUTANEOUS at 05:32

## 2023-06-26 RX ADMIN — Medication 975 MILLIGRAM(S): at 00:38

## 2023-06-26 RX ADMIN — HEPARIN SODIUM 5000 UNIT(S): 5000 INJECTION INTRAVENOUS; SUBCUTANEOUS at 19:04

## 2023-06-26 RX ADMIN — Medication 600 MILLIGRAM(S): at 15:08

## 2023-06-26 RX ADMIN — Medication 975 MILLIGRAM(S): at 13:24

## 2023-06-26 RX ADMIN — Medication 600 MILLIGRAM(S): at 20:51

## 2023-06-26 RX ADMIN — Medication 600 MILLIGRAM(S): at 08:55

## 2023-06-26 RX ADMIN — Medication 600 MILLIGRAM(S): at 15:40

## 2023-06-26 RX ADMIN — Medication 975 MILLIGRAM(S): at 05:32

## 2023-06-26 NOTE — PROGRESS NOTE ADULT - ASSESSMENT
A/P: 26yo POD#1 s/p LTCS. Patient is stable and doing well post-operatively.      #cHTN  - BPs well controlled overnight  - Continue Labetalol 200 BID  - HELLP wnl    #Postoperative Status  - Continue regular diet.  - Increase ambulation.  - Continue motrin, tylenol, oxycodone PRN for pain control.    - F/u AM CBC    Lian Dean MD PGY3
A/P: 26yo PMH of cHTN POD#2 s/p LTCS for NRFHT  Patient is stable and doing well post-operatively.      #cHTN  - BPs wnl overnight  - No signs/symptoms of siPEC  - HELLP wnl on admission  - c/w home Labetalol 200 BID    #postpartum  - Continue regular diet.  - Increase ambulation.  - Continue motrin, tylenol, oxycodone PRN for pain control    ASHOK Mckinnon PGY-2

## 2023-06-26 NOTE — CHART NOTE - NSCHARTNOTEFT_GEN_A_CORE
Patient seen for: nutrition consult for GDM postpartum education prior to discharge    Source: patient, electronic medical record     Patient is: ; GDM [A1]    Chart reviewed, events noted. Meds/Labs reviewed.    Anthropometrics as per pt profile:  Height: 5'6" inches  Pre-pregnancy weight: 180 pounds   Weight gain: 36 pounds   Admit/Dosing wt: 216.9 pounds     Nutrition Diagnosis:   Food and Nutrition Related Knowledge Deficit related to limited previous exposure to postpartum GDM nutrition education and recommendations as evidenced by GDM postpartum.    Goal: Pt to state at least two teach back points.    Intervention:  1. Education: Pt educated on postpartum dietary recommendations, including risk of development of T2DM; reinforced importance of DM screening 4-12 weeks postpartum. Reviewed nutrition recommendations for breast feeding, including adequate protein, calorie, and fluid intake.   2. Handout: "What to expect now that you have had your baby"     Monitoring:  No other nutrition risks were identified during this encounter.  RD remains available upon request and will follow up per protocol.  Michelle Starr MS, RD, CDN #[phone] or Teams (preferred)

## 2023-06-26 NOTE — PROGRESS NOTE ADULT - SUBJECTIVE AND OBJECTIVE BOX
Day 1 of Anesthesia Pain Management Service    SUBJECTIVE:  Pain Scale Score:          [X] Refer to charted pain scores    THERAPY: Received PF epidural morphine as above    OBJECTIVE:    Sedation:        	[X] Alert	[ ] Drowsy	[ ] Arousable      [ ] Asleep       [ ] Unresponsive    Side Effects:	[X] None	[ ] Nausea	[ ] Vomiting         [ ] Pruritus  		[ ] Weakness            [ ] Numbness	          [ ] Other:    ASSESSMENT/ PLAN  [X] Patient transitioned to prn analgesics  [X] Pain management per primary service, pain service to sign off   [X]Documentation and Verification of current medications
OB Progress Note: LTCS, POD#2    S: 26yo POD#2 s/p LTCS for cat 2 FHT s/p PPROM, c/b cHTN and A1. Pain is well controlled. She is tolerating a regular diet and passing flatus. She is voiding spontaneously, and ambulating without difficulty. Denies CP/SOB. Denies lightheadedness/dizziness. Denies N/V.    O:  Vitals:  Vital Signs Last 24 Hrs  T(C): 36.7 (26 Jun 2023 05:20), Max: 37.1 (25 Jun 2023 09:59)  T(F): 98.1 (26 Jun 2023 05:20), Max: 98.8 (25 Jun 2023 09:59)  HR: 82 (26 Jun 2023 05:20) (82 - 100)  BP: 119/79 (26 Jun 2023 05:20) (105/68 - 131/79)  BP(mean): --  RR: 18 (26 Jun 2023 05:20) (18 - 18)  SpO2: 99% (26 Jun 2023 05:20) (97% - 99%)    Parameters below as of 26 Jun 2023 05:20  Patient On (Oxygen Delivery Method): room air        MEDICATIONS  (STANDING):  acetaminophen     Tablet .. 975 milliGRAM(s) Oral <User Schedule>  acetaminophen   IVPB .. 1000 milliGRAM(s) IV Intermittent once  diphtheria/tetanus/pertussis (acellular) Vaccine (Adacel) 0.5 milliLiter(s) IntraMuscular once  heparin   Injectable 5000 Unit(s) SubCutaneous every 12 hours  ibuprofen  Tablet. 600 milliGRAM(s) Oral every 6 hours  labetalol 200 milliGRAM(s) Oral every 12 hours  lactated ringers. 1000 milliLiter(s) (125 mL/Hr) IV Continuous <Continuous>  nalbuphine Injectable 2.5 milliGRAM(s) IV Push every 6 hours  oxytocin Infusion 333.333 milliUNIT(s)/Min (1000 mL/Hr) IV Continuous <Continuous>  oxytocin Infusion. 4 milliUNIT(s)/Min (4 mL/Hr) IV Continuous <Continuous>      MEDICATIONS  (PRN):  diphenhydrAMINE 25 milliGRAM(s) Oral every 6 hours PRN Pruritus  fentaNYL    Injectable 25 MICROGram(s) IV Push every 5 minutes PRN Moderate Pain (4 - 6)  fentaNYL    Injectable 50 MICROGram(s) IV Push every 10 minutes PRN Severe Pain (7 - 10)  HYDROmorphone  Injectable 1 milliGRAM(s) IV Push once PRN Severe Pain (7 - 10)  lanolin Ointment 1 Application(s) Topical every 6 hours PRN Sore Nipples  magnesium hydroxide Suspension 30 milliLiter(s) Oral two times a day PRN Constipation  oxyCODONE    IR 5 milliGRAM(s) Oral once PRN Moderate to Severe Pain (4-10)  oxyCODONE    IR 5 milliGRAM(s) Oral every 3 hours PRN Moderate to Severe Pain (4-10)  simethicone 80 milliGRAM(s) Chew every 4 hours PRN Gas      Labs:  Blood type: A Positive  Rubella IgG: RPR: Negative                          10.5<L>   12.10<H> >-----------< 254    ( 06-25 @ 07:13 )             32.2<L>                        11.3<L>   9.61 >-----------< 284    ( 06-24 @ 02:43 )             34.7    06-24-23 @ 02:44      138  |  105  |  6<L>  ----------------------------<  88  3.9   |  20<L>  |  0.57        Ca    9.7      24 Jun 2023 02:44    TPro  6.3  /  Alb  3.5  /  TBili  0.2  /  DBili  x   /  AST  16  /  ALT  18  /  AlkPhos  68  06-24-23 @ 02:44          PE:  General: NAD  Abdomen: Soft, appropriately tender, incision c/d/i.  Extremities: No erythema, no pitting edema    
OB Progress Note:  Delivery, POD#1    S: 28yo POD#1 s/p LTCS c/b cHTN, A1, PPROM, and cat2. Her pain is well controlled. She is tolerating a regular diet and passing flatus. Denies N/V. Denies CP/SOB/lightheadedness/dizziness.   She is ambulating without difficulty. Voiding spontaneously.     O:   Vital Signs Last 24 Hrs  T(C): 36.9 (2023 01:12), Max: 36.9 (2023 08:31)  T(F): 98.4 (2023 01:12), Max: 98.42 (2023 08:31)  HR: 83 (2023 01:12) (82 - 109)  BP: 120/87 (2023 01:12) (100/59 - 125/75)  BP(mean): 86 (2023 16:00) (86 - 86)  RR: 17 (:12) (15 - 20)  SpO2: 95% (2023 01:12) (91% - 100%)        Labs:  Blood type: A Positive  Rubella IgG: RPR: Negative                          11.3<L>   9.61 >-----------< 284    (  @ 02:43 )             34.7    23 @ 02:44      138  |  105  |  6<L>  ----------------------------<  88  3.9   |  20<L>  |  0.57        Ca    9.7      2023 02:44    TPro  6.3  /  Alb  3.5  /  TBili  0.2  /  DBili  x   /  AST  16  /  ALT  18  /  AlkPhos  68  23 @ 02:44    PE:  General: NAD  Abdomen: Mildly distended, appropriately tender, incision c/d/i.  Extremities: No erythema, no pitting edema
OB Attending Note      S: Pt feeling well, +Flatus, pain controlled. ambulating, voiding, tolerating PO. No headache, blurry vision, RUQ pain.     Physical exam:    Vital Signs Last 24 Hrs  T(C): 37.1 (2023 09:45), Max: 37.1 (2023 17:11)  T(F): 98.8 (2023 09:45), Max: 98.8 (2023 17:11)  HR: 99 (:45) (82 - 100)  BP: 122/77 (2023 09:45) (106/71 - 131/79)  BP(mean): --  RR: 18 (:45) (18 - 18)  SpO2: 96% (:45) (96% - 99%)    Parameters below as of 2023 09:45  Patient On (Oxygen Delivery Method): room air      I&O's Summary    2023 07:01  -  2023 07:00  --------------------------------------------------------  IN: 0 mL / OUT: 225 mL / NET: -225 mL        Gen: NAD  Abdomen: Soft, nontender, no distension , firm uterine fundus at umbilicus.  Incision: Clean, dry, and intact  Scant Lochia  Ext: No calf tenderness    LABS:                        10.5   12.10 )-----------( 254      ( 2023 07:13 )             32.2       23 @ 02:44      138  |  105  |  6<L>  ----------------------------<  88  3.9   |  20<L>  |  0.57        Ca    9.7      2023 02:44    TPro  6.3  /  Alb  3.5  /  TBili  0.2  /  DBili  x   /  AST  16  /  ALT  18  /  AlkPhos  68  23 @ 02:44              Assessment and Plan  POD #2 s/p  section with history of CHTN, chronic hep B with history of fetal demise and bacteremia in last pregnancy.   Doing well.  Continue Ambulation/OOB/Venodynes/heparin  Cont Pain medications  Regular diet  Continue labetalol.   Discharge home. Discharge instructions given. Strict precautions to call with elevated BPS above 160/100 and any signs of fever or infection. All questions answered.     Katja Hernandez DO

## 2023-06-29 ENCOUNTER — APPOINTMENT (OUTPATIENT)
Dept: ANTEPARTUM | Facility: CLINIC | Age: 28
End: 2023-06-29

## 2023-07-06 ENCOUNTER — APPOINTMENT (OUTPATIENT)
Dept: ANTEPARTUM | Facility: CLINIC | Age: 28
End: 2023-07-06

## 2023-09-24 LAB — SURGICAL PATHOLOGY STUDY: SIGNIFICANT CHANGE UP

## 2024-03-13 DIAGNOSIS — B18.1 CHRONIC VIRAL HEPATITIS B W/OUT DELTA-AGENT: ICD-10-CM

## 2024-05-02 NOTE — OB RN INTRAOPERATIVE NOTE - NSPACKS1_OBGYN_ALL_OB
The patient's goals for the shift include patient comfort and safety.      The clinical goals for the shift include  patient to have echo, and have decrease in chest pain.        None

## 2024-12-04 NOTE — DISCHARGE NOTE OB - NS AS DC AMI YN
Copied from CRM #[id number]. Topic: MW Clinical Concerns - MW Routine RN Triage  >> Dec 2, 2024  8:51 AM Erika LEWIS wrote:  Luis Freeman Luis Freeman    Specialist or PCP Name: Vira Meraz     Symptoms: [place] F 24 - 24 hours of light pink discharge     Pregnant (females aged 13-60. If Yes, how long?) : yes - 9 weeks    Call Back # : [phone]    Which State are you currently located in?: [place]    Name of Clinic Site / Acct# : [facility] - [address]  - OB-GYN        
Pt called to state she is still having some spotting or bleeding. She did go to ER at [facility]. She has not had any records sent to us at this time. Pt instructed to follow up with whoever [facility] told her to follow up with until she get in to see our OB doctors  
Spoke to pt who states she did have intercourse 2 days ago. Spotting after that which she does feel is increasing in amount. Instructed to report to ER for evaluation. Pt [facility] to [facility] than [place]. She will obtain the ER labs/ultrasound faxed to office  
no